# Patient Record
Sex: MALE | Race: BLACK OR AFRICAN AMERICAN | NOT HISPANIC OR LATINO | Employment: UNEMPLOYED | URBAN - METROPOLITAN AREA
[De-identification: names, ages, dates, MRNs, and addresses within clinical notes are randomized per-mention and may not be internally consistent; named-entity substitution may affect disease eponyms.]

---

## 2019-07-27 ENCOUNTER — HOSPITAL ENCOUNTER (OUTPATIENT)
Dept: TELEMEDICINE | Facility: HOSPITAL | Age: 64
Discharge: HOME | End: 2019-07-27
Payer: MEDICARE

## 2019-07-27 DIAGNOSIS — Z92.82 RECEIVED INTRAVENOUS TISSUE PLASMINOGEN ACTIVATOR (T-PA) IN EMERGENCY DEPARTMENT: ICD-10-CM

## 2019-07-27 DIAGNOSIS — I63.412 STROKE DUE TO EMBOLISM OF LEFT MIDDLE CEREBRAL ARTERY: ICD-10-CM

## 2019-07-27 PROCEDURE — G0427 PR INPT TELEHEALTH CON 70/>M: ICD-10-PCS | Mod: GT,G0,, | Performed by: PSYCHIATRY & NEUROLOGY

## 2019-07-27 PROCEDURE — G0427 INPT/ED TELECONSULT70: HCPCS | Mod: GT,G0,, | Performed by: PSYCHIATRY & NEUROLOGY

## 2019-07-28 ENCOUNTER — HOSPITAL ENCOUNTER (INPATIENT)
Facility: HOSPITAL | Age: 64
LOS: 15 days | Discharge: HOME HEALTH | DRG: 064 | End: 2019-08-12
Attending: EMERGENCY MEDICINE | Admitting: PSYCHIATRY & NEUROLOGY
Payer: MEDICARE

## 2019-07-28 DIAGNOSIS — Z92.82 RECEIVED INTRAVENOUS TISSUE PLASMINOGEN ACTIVATOR (T-PA) IN EMERGENCY DEPARTMENT: ICD-10-CM

## 2019-07-28 DIAGNOSIS — I63.9 CEREBROVASCULAR ACCIDENT (CVA), UNSPECIFIED MECHANISM: ICD-10-CM

## 2019-07-28 DIAGNOSIS — I63.412 STROKE DUE TO EMBOLISM OF LEFT MIDDLE CEREBRAL ARTERY: Primary | ICD-10-CM

## 2019-07-28 DIAGNOSIS — I63.412 EMBOLIC STROKE INVOLVING LEFT MIDDLE CEREBRAL ARTERY: ICD-10-CM

## 2019-07-28 DIAGNOSIS — R47.01 APHASIA: ICD-10-CM

## 2019-07-28 DIAGNOSIS — I63.512 ACUTE ISCHEMIC LEFT MCA STROKE: ICD-10-CM

## 2019-07-28 DIAGNOSIS — I63.9 STROKE: ICD-10-CM

## 2019-07-28 DIAGNOSIS — I10 HYPERTENSION, UNSPECIFIED TYPE: ICD-10-CM

## 2019-07-28 PROBLEM — G93.6 CYTOTOXIC CEREBRAL EDEMA: Status: ACTIVE | Noted: 2019-07-28

## 2019-07-28 PROBLEM — F10.10 ALCOHOL ABUSE: Status: ACTIVE | Noted: 2019-07-28

## 2019-07-28 LAB
ABO + RH BLD: NORMAL
ALBUMIN SERPL BCP-MCNC: 2.4 G/DL (ref 3.5–5.2)
ALP SERPL-CCNC: 68 U/L (ref 55–135)
ALT SERPL W/O P-5'-P-CCNC: 10 U/L (ref 10–44)
AMPHET+METHAMPHET UR QL: NEGATIVE
ANION GAP SERPL CALC-SCNC: 7 MMOL/L (ref 8–16)
AST SERPL-CCNC: 11 U/L (ref 10–40)
BARBITURATES UR QL SCN>200 NG/ML: NEGATIVE
BASOPHILS # BLD AUTO: 0.02 K/UL (ref 0–0.2)
BASOPHILS NFR BLD: 0.4 % (ref 0–1.9)
BENZODIAZ UR QL SCN>200 NG/ML: NORMAL
BILIRUB SERPL-MCNC: 0.4 MG/DL (ref 0.1–1)
BILIRUB UR QL STRIP: NEGATIVE
BLD GP AB SCN CELLS X3 SERPL QL: NORMAL
BUN SERPL-MCNC: 9 MG/DL (ref 8–23)
BZE UR QL SCN: NORMAL
CALCIUM SERPL-MCNC: 8.2 MG/DL (ref 8.7–10.5)
CANNABINOIDS UR QL SCN: NEGATIVE
CHLORIDE SERPL-SCNC: 110 MMOL/L (ref 95–110)
CHOLEST SERPL-MCNC: 160 MG/DL (ref 120–199)
CHOLEST/HDLC SERPL: 4.8 {RATIO} (ref 2–5)
CLARITY UR REFRACT.AUTO: CLEAR
CO2 SERPL-SCNC: 22 MMOL/L (ref 23–29)
COLOR UR AUTO: ABNORMAL
CREAT SERPL-MCNC: 0.7 MG/DL (ref 0.5–1.4)
CREAT UR-MCNC: 31 MG/DL (ref 23–375)
DIFFERENTIAL METHOD: ABNORMAL
EOSINOPHIL # BLD AUTO: 0.2 K/UL (ref 0–0.5)
EOSINOPHIL NFR BLD: 3 % (ref 0–8)
ERYTHROCYTE [DISTWIDTH] IN BLOOD BY AUTOMATED COUNT: 13.1 % (ref 11.5–14.5)
EST. GFR  (AFRICAN AMERICAN): >60 ML/MIN/1.73 M^2
EST. GFR  (NON AFRICAN AMERICAN): >60 ML/MIN/1.73 M^2
ESTIMATED AVG GLUCOSE: 91 MG/DL (ref 68–131)
ETHANOL UR-MCNC: <10 MG/DL
GLUCOSE SERPL-MCNC: 70 MG/DL (ref 70–110)
GLUCOSE UR QL STRIP: NEGATIVE
HBA1C MFR BLD HPLC: 4.8 % (ref 4–5.6)
HCT VFR BLD AUTO: 30.3 % (ref 40–54)
HDLC SERPL-MCNC: 33 MG/DL (ref 40–75)
HDLC SERPL: 20.6 % (ref 20–50)
HGB BLD-MCNC: 9.7 G/DL (ref 14–18)
HGB UR QL STRIP: ABNORMAL
IMM GRANULOCYTES # BLD AUTO: 0.04 K/UL (ref 0–0.04)
IMM GRANULOCYTES NFR BLD AUTO: 0.8 % (ref 0–0.5)
KETONES UR QL STRIP: NEGATIVE
LDLC SERPL CALC-MCNC: 110.6 MG/DL (ref 63–159)
LEUKOCYTE ESTERASE UR QL STRIP: ABNORMAL
LYMPHOCYTES # BLD AUTO: 1.3 K/UL (ref 1–4.8)
LYMPHOCYTES NFR BLD: 26.4 % (ref 18–48)
MAGNESIUM SERPL-MCNC: 1.7 MG/DL (ref 1.6–2.6)
MCH RBC QN AUTO: 28.5 PG (ref 27–31)
MCHC RBC AUTO-ENTMCNC: 32 G/DL (ref 32–36)
MCV RBC AUTO: 89 FL (ref 82–98)
METHADONE UR QL SCN>300 NG/ML: NEGATIVE
MICROSCOPIC COMMENT: ABNORMAL
MONOCYTES # BLD AUTO: 0.5 K/UL (ref 0.3–1)
MONOCYTES NFR BLD: 9 % (ref 4–15)
NEUTROPHILS # BLD AUTO: 3 K/UL (ref 1.8–7.7)
NEUTROPHILS NFR BLD: 60.4 % (ref 38–73)
NITRITE UR QL STRIP: NEGATIVE
NONHDLC SERPL-MCNC: 127 MG/DL
NRBC BLD-RTO: 0 /100 WBC
OPIATES UR QL SCN: NEGATIVE
PCP UR QL SCN>25 NG/ML: NEGATIVE
PH UR STRIP: 7 [PH] (ref 5–8)
PHOSPHATE SERPL-MCNC: 3.1 MG/DL (ref 2.7–4.5)
PLATELET # BLD AUTO: 249 K/UL (ref 150–350)
PMV BLD AUTO: 10.8 FL (ref 9.2–12.9)
POCT GLUCOSE: 73 MG/DL (ref 70–110)
POCT GLUCOSE: 96 MG/DL (ref 70–110)
POTASSIUM SERPL-SCNC: 3.3 MMOL/L (ref 3.5–5.1)
PROT SERPL-MCNC: 5.6 G/DL (ref 6–8.4)
PROT UR QL STRIP: NEGATIVE
RBC # BLD AUTO: 3.4 M/UL (ref 4.6–6.2)
RBC #/AREA URNS AUTO: 83 /HPF (ref 0–4)
SODIUM SERPL-SCNC: 139 MMOL/L (ref 136–145)
SP GR UR STRIP: 1.03 (ref 1–1.03)
SQUAMOUS #/AREA URNS AUTO: 0 /HPF
T4 FREE SERPL-MCNC: 1 NG/DL (ref 0.71–1.51)
TOXICOLOGY INFORMATION: NORMAL
TRIGL SERPL-MCNC: 82 MG/DL (ref 30–150)
TSH SERPL DL<=0.005 MIU/L-ACNC: 0.39 UIU/ML (ref 0.4–4)
URN SPEC COLLECT METH UR: ABNORMAL
WBC # BLD AUTO: 5 K/UL (ref 3.9–12.7)
WBC #/AREA URNS AUTO: 0 /HPF (ref 0–5)

## 2019-07-28 PROCEDURE — 63600175 PHARM REV CODE 636 W HCPCS: Performed by: STUDENT IN AN ORGANIZED HEALTH CARE EDUCATION/TRAINING PROGRAM

## 2019-07-28 PROCEDURE — 83735 ASSAY OF MAGNESIUM: CPT

## 2019-07-28 PROCEDURE — 93005 ELECTROCARDIOGRAM TRACING: CPT

## 2019-07-28 PROCEDURE — 99291 CRITICAL CARE FIRST HOUR: CPT

## 2019-07-28 PROCEDURE — 80307 DRUG TEST PRSMV CHEM ANLYZR: CPT

## 2019-07-28 PROCEDURE — 99223 PR INITIAL HOSPITAL CARE,LEVL III: ICD-10-PCS | Mod: ,,, | Performed by: NURSE PRACTITIONER

## 2019-07-28 PROCEDURE — 84100 ASSAY OF PHOSPHORUS: CPT

## 2019-07-28 PROCEDURE — 99291 PR CRITICAL CARE, E/M 30-74 MINUTES: ICD-10-PCS | Mod: GC,,, | Performed by: PSYCHIATRY & NEUROLOGY

## 2019-07-28 PROCEDURE — 99291 CRITICAL CARE FIRST HOUR: CPT | Mod: GC,,, | Performed by: PSYCHIATRY & NEUROLOGY

## 2019-07-28 PROCEDURE — 25500020 PHARM REV CODE 255: Performed by: EMERGENCY MEDICINE

## 2019-07-28 PROCEDURE — 84443 ASSAY THYROID STIM HORMONE: CPT

## 2019-07-28 PROCEDURE — 82962 GLUCOSE BLOOD TEST: CPT

## 2019-07-28 PROCEDURE — 84439 ASSAY OF FREE THYROXINE: CPT

## 2019-07-28 PROCEDURE — 99291 CRITICAL CARE FIRST HOUR: CPT | Mod: ,,, | Performed by: EMERGENCY MEDICINE

## 2019-07-28 PROCEDURE — 25000003 PHARM REV CODE 250: Performed by: PHYSICIAN ASSISTANT

## 2019-07-28 PROCEDURE — 80053 COMPREHEN METABOLIC PANEL: CPT

## 2019-07-28 PROCEDURE — 80061 LIPID PANEL: CPT

## 2019-07-28 PROCEDURE — 81001 URINALYSIS AUTO W/SCOPE: CPT

## 2019-07-28 PROCEDURE — 20000000 HC ICU ROOM

## 2019-07-28 PROCEDURE — 83036 HEMOGLOBIN GLYCOSYLATED A1C: CPT

## 2019-07-28 PROCEDURE — 86901 BLOOD TYPING SEROLOGIC RH(D): CPT

## 2019-07-28 PROCEDURE — 85025 COMPLETE CBC W/AUTO DIFF WBC: CPT

## 2019-07-28 PROCEDURE — 63600175 PHARM REV CODE 636 W HCPCS: Performed by: NURSE PRACTITIONER

## 2019-07-28 PROCEDURE — 93010 EKG 12-LEAD: ICD-10-PCS | Mod: 76,,, | Performed by: INTERNAL MEDICINE

## 2019-07-28 PROCEDURE — 93010 ELECTROCARDIOGRAM REPORT: CPT | Mod: 76,,, | Performed by: INTERNAL MEDICINE

## 2019-07-28 PROCEDURE — 99291 PR CRITICAL CARE, E/M 30-74 MINUTES: ICD-10-PCS | Mod: ,,, | Performed by: EMERGENCY MEDICINE

## 2019-07-28 PROCEDURE — 99223 1ST HOSP IP/OBS HIGH 75: CPT | Mod: ,,, | Performed by: NURSE PRACTITIONER

## 2019-07-28 PROCEDURE — 94761 N-INVAS EAR/PLS OXIMETRY MLT: CPT

## 2019-07-28 PROCEDURE — 93010 ELECTROCARDIOGRAM REPORT: CPT | Mod: ,,, | Performed by: INTERNAL MEDICINE

## 2019-07-28 RX ORDER — HYDRALAZINE HYDROCHLORIDE 20 MG/ML
10 INJECTION INTRAMUSCULAR; INTRAVENOUS EVERY 6 HOURS PRN
Status: DISCONTINUED | OUTPATIENT
Start: 2019-07-28 | End: 2019-08-09

## 2019-07-28 RX ORDER — ONDANSETRON 2 MG/ML
4 INJECTION INTRAMUSCULAR; INTRAVENOUS EVERY 6 HOURS PRN
Status: DISCONTINUED | OUTPATIENT
Start: 2019-07-28 | End: 2019-08-12 | Stop reason: HOSPADM

## 2019-07-28 RX ORDER — DEXTROSE MONOHYDRATE 50 MG/ML
INJECTION, SOLUTION INTRAVENOUS CONTINUOUS
Status: DISCONTINUED | OUTPATIENT
Start: 2019-07-28 | End: 2019-07-29

## 2019-07-28 RX ORDER — MIDAZOLAM HYDROCHLORIDE 1 MG/ML
1 INJECTION INTRAMUSCULAR; INTRAVENOUS ONCE
Status: COMPLETED | OUTPATIENT
Start: 2019-07-28 | End: 2019-07-28

## 2019-07-28 RX ORDER — THIAMINE HCL 100 MG
100 TABLET ORAL DAILY
Status: DISCONTINUED | OUTPATIENT
Start: 2019-07-28 | End: 2019-08-07

## 2019-07-28 RX ORDER — AMOXICILLIN 250 MG
1 CAPSULE ORAL 2 TIMES DAILY
Status: DISCONTINUED | OUTPATIENT
Start: 2019-07-28 | End: 2019-08-12 | Stop reason: HOSPADM

## 2019-07-28 RX ORDER — SODIUM CHLORIDE 0.9 % (FLUSH) 0.9 %
10 SYRINGE (ML) INJECTION
Status: DISCONTINUED | OUTPATIENT
Start: 2019-07-28 | End: 2019-08-12 | Stop reason: HOSPADM

## 2019-07-28 RX ORDER — FOLIC ACID 1 MG/1
1 TABLET ORAL DAILY
Status: DISCONTINUED | OUTPATIENT
Start: 2019-07-28 | End: 2019-08-07

## 2019-07-28 RX ORDER — ACETAMINOPHEN 325 MG/1
650 TABLET ORAL EVERY 6 HOURS PRN
Status: DISCONTINUED | OUTPATIENT
Start: 2019-07-28 | End: 2019-08-12 | Stop reason: HOSPADM

## 2019-07-28 RX ORDER — ATORVASTATIN CALCIUM 20 MG/1
40 TABLET, FILM COATED ORAL DAILY
Status: DISCONTINUED | OUTPATIENT
Start: 2019-07-28 | End: 2019-08-12 | Stop reason: HOSPADM

## 2019-07-28 RX ORDER — POTASSIUM CHLORIDE 7.45 MG/ML
10 INJECTION INTRAVENOUS
Status: COMPLETED | OUTPATIENT
Start: 2019-07-28 | End: 2019-07-28

## 2019-07-28 RX ADMIN — MIDAZOLAM HYDROCHLORIDE 1 MG: 1 INJECTION, SOLUTION INTRAMUSCULAR; INTRAVENOUS at 03:07

## 2019-07-28 RX ADMIN — POTASSIUM CHLORIDE 10 MEQ: 10 INJECTION, SOLUTION INTRAVENOUS at 03:07

## 2019-07-28 RX ADMIN — SENNOSIDES,DOCUSATE SODIUM 1 TABLET: 8.6; 5 TABLET, FILM COATED ORAL at 10:07

## 2019-07-28 RX ADMIN — IOHEXOL 75 ML: 350 INJECTION, SOLUTION INTRAVENOUS at 01:07

## 2019-07-28 RX ADMIN — DEXTROSE: 5 SOLUTION INTRAVENOUS at 04:07

## 2019-07-28 RX ADMIN — POTASSIUM CHLORIDE 10 MEQ: 10 INJECTION, SOLUTION INTRAVENOUS at 06:07

## 2019-07-28 NOTE — ASSESSMENT & PLAN NOTE
65 y/o male with L MCA syndrome received IV TPA    Antithrombotics: once out of tpa window may begin ASA 81 mg daily    Statins: Lipitor 40 mg daily    Aggressive risk factor modification: HTN     Rehab efforts: The patient has been evaluated by a stroke team provider and the therapy needs have been fully considered based off the presenting complaints and exam findings. The following therapy evaluations are needed: PT evaluate and treat, OT evaluate and treat, SLP evaluate and treat, PM&R evaluate for appropriate placement    Diagnostics ordered/pending: HgbA1C to assess blood glucose levels, Lipid Profile to assess cholesterol levels, TTE to assess cardiac function/status , TSH to assess thyroid function    VTE prophylaxis: SCD's saman begin heparin 5000 units sc Q8H on 7-29-19 at 0600    BP parameters: Infarct: Post tPA, SBP <180

## 2019-07-28 NOTE — ED TRIAGE NOTES
Patient transferred from [facility], found outside with his friend at 2200 with Right sided Weakness, Right sided Facial Droop, Slurred Speech and Left Fixed Gaze.

## 2019-07-28 NOTE — PLAN OF CARE
65 y/o Left MCA syndrome, Acute Infarct with in left post central gyrus, S/P rtPA, weakness in right arm and leg improved   - Patient started on dextrose 100ml/hour till speech eevaluate patient  - Hypokalemia, replete and replenish K+  - Consider EEG to rule out any epileptic process  - F/U with CTA neck   - F/U with TTE.  - Start on ASA and Statin post 24 hr rtPA    Néstor Anaya MD  [facility]

## 2019-07-28 NOTE — CONSULTS
[facility]  Vascular Neurology  Comprehensive Stroke Center  Tele-Consultation Note      Consults    Consulting Provider: BRITTANY DAVIS  Current Providers  No providers found    Patient Location: [facility] ED [facility] Emergency Department  Spoke hospital nurse at bedside with patient assisting consultant.     Patient information was obtained from patient.         Assessment/Plan:     STROKE DOCUMENTATION     Acute Stroke Times:   Acute Stroke Times   Last Known Normal Date: 07/27/19  Last Known Normal Time: 2200  Symptom Onset Date: 07/27/19  Symptom Onset Time: 2200  Stroke Team Called Date: 07/27/19  Stroke Team Called Time: 2247  Stroke Team Arrival Date: 07/27/19  Stroke Team Arrival Time: 2249  CT Interpretation Time: 2251  Decision to Treat Time for Alteplase: 2256    NIH Scale:  Interval: baseline  1a. Level of Consciousness: 0-->Alert, keenly responsive  1b. LOC Questions: 2-->Answers neither question correctly  1c. LOC Commands: 2-->Performs neither task correctly  2. Best Gaze: 1-->Partial gaze palsy, gaze is abnormal in one or both eyes, but forced deviation or total gaze paresis is not present  3. Visual: 0-->No visual loss  4. Facial Palsy: 2-->Partial paralysis (total or near-total paralysis of lower face)  5a. Motor Arm, Left: 0-->No drift, limb holds 90 (or 45) degrees for full 10 secs  5b. Motor Arm, Right: 3-->No effort against gravity, limb falls  6a. Motor Leg, Left: 0-->No drift, leg holds 30 degree position for full 5 secs  6b. Motor Leg, Right: 3-->No effort against gravity, leg falls to bed immediately  7. Limb Ataxia: 0-->Absent  8. Sensory: 2-->Severe to total sensory loss, patient is not aware of being touched in the face, arm, and leg  9. Best Language: 2-->Severe aphasia, all communication is through fragmentary expression, great need for inference, questioning, and guessing by the listener. Range of information that  can be exchanged is limited, listener carries burden of. . . (see row details)  10. Dysarthria: 1-->Mild-to-moderate dysarthria, patient slurs at least some words and, at worst, can be understood with some difficulty  11. Extinction and Inattention (formerly Neglect): 1-->Visual, tactile, auditory, spatial, or personal inattention or extinction to bilateral simultaneous stimulation in one of the sensory modalities  Total (NIH Stroke Scale): 19     Modified Alyssa Score: 1  Rosalba Coma Scale:    ABCD2 Score:    [id number] Score:   HAS -BLED Score:   ICH Score:   Hunt & Barksdale Classification:       Diagnoses:   Stroke due to embolism of left middle cerebral artery  Stroke due to embolism of left middle cerebral artery  Antithrombotics for secondary stroke prevention: Antiplatelets: None: Hold all Antithrombotics x 24 hours after IV t-PA administration    Statins for secondary stroke prevention and hyperlipidemia, if present:   Statins: Atorvastatin- 80 mg daily    Aggressive risk factor modification: HTN, HLD     Rehab efforts: The patient has been evaluated by a stroke team provider and the therapy needs have been fully considered based off the presenting complaints and exam findings. The following therapy evaluations are needed: PT evaluate and treat, OT evaluate and treat, SLP evaluate and treat    Diagnostics ordered/pending: CTA Head to assess vasculature , CTA Neck/Arch to assess vasculature, MRI head without contrast to assess brain parenchyma, TTE to assess cardiac function/status , TSH to assess thyroid function    VTE prophylaxis: None: Reason for No Pharmacological VTE Prophylaxis: Mechanical prophylaxis: Place SCDs    BP parameters: Infarct: Post tPA, SBP <180            There were no vitals taken for this visit.  Alteplase Eligible?: Yes  Alteplase Recommendation:   Alteplase Total Dose:   Total dose: Alteplase 0.9mg/kg (max dose:90mg)                      ** based on acquired weight from facility    Bolus Dose:   10% of total Alteplase dose given intravenously over 1 minute   Continuous Infusion Dose:   Remaining 90% of total Alteplase dose infused intravenously over 60 minutes    **infusion must start at the same time as the bolus dose     Additional Recommendations:   1. Neurological assessment and vital signs (except temperature) every 15 minutes during Altaplase infusion.  2. Frequency of BP assessments may need to be increased if systolic BP stays >= 180 mm Hg or diastolic BP stays >= 105 mm Hg. Administer antihypertensive meds as ordered  3. Continue to monitor and control blood pressure and monitor for neurological deterioration every 15 minutes for the first hour after the infusion is stopped. Then every 30 minutes for the next 6 hours. Perform hourly monitoring from the 8th post-infusion hour until 24 hours post-infusion.  4. Temperature every 4 hours or as required.  5. Follow hospital protocol for further orders re: post tPA infusion patient management.  6. No antithrombotics or anticoagulants (including but not limited to: heparin, warfarin, aspirin, clopidigrel, or dipyridamole) for 24 hours, then start antithrombotics as ordered by treating physician    Adapted from the American Heart Association/American Stroke Association (AHA/ASA) and American Association of Neuroscience Nurses (AANN) Guidelines.   Possible Interventional Revascularization Candidate? Yes    Disposition Recommendation: transfer to [facility] by  air  stat    Subjective:     History of Present Illness:  63 y/o male LKN 2200 today when he had sudden onset right hemiplegia, aphasia, right dipika-neglect, left gaze preference and right hemisensory loss.      Woke up with symptoms?: no    Recent bleeding noted: no  Does the patient take any Blood Thinners? no  Medications: Antiplatelets:  aspirin      Past Medical History: hypertension, hyperlipidemia and stroke    Past Surgical History: none and no major surgeries within  the last 2 weeks    Family History: no relevant history    Social History: no smoking, no drinking, no drugs    Allergies: Allergies have not been reviewed No known drug allergies    Review of Systems   Neurological: Positive for facial asymmetry, speech difficulty, weakness and numbness.   All other systems reviewed and are negative.    Objective:   Vitals: There were no vitals taken for this visit. BP: 192/101 and Heart Rate: 72    CT READ: Yes  No hemmorhage. No mass effect. No early infarct signs.     Physical Exam   Constitutional: He is oriented to person, place, and time. He appears well-developed and well-nourished.   HENT:   Head: Normocephalic and atraumatic.   Eyes: Pupils are equal, round, and reactive to light. EOM are normal.   Cardiovascular: Normal rate and regular rhythm.   Pulmonary/Chest: Effort normal.   Neurological: He is alert and oriented to person, place, and time. A cranial nerve deficit and sensory deficit is present.   Vitals reviewed.            Recommended the emergency room physician to have a brief discussion with the patient and/or family if available regarding the risks and benefits of treatment, and to briefly document the occurrence of that discussion in his clinical encounter note.     The attending portion of this evaluation, treatment, and documentation was performed per Henna Parra MD via audiovisual.    Billing code:  (moderate to severe stroke, large areas of edema, some mimics)    · This patient has a critical neurological condition/illness, with high morbidity and mortality.  · There is a high probability for acute neurological change leading to clinical and possibly life-threatening deterioration requiring highest level of physician preparedness for urgent intervention.  · Care was coordinated with other physicians involved in the patient's care.  · Radiologic studies and laboratory data were reviewed and interpreted, and plan of care was re-assessed based on  the results.  · Diagnosis, treatment options and prognosis may have been discussed with the patient and/or family members or caregiver.  · Further advanced medical management and further evaluation is warranted for his care.      In your opinion, this was a: Tier 1 Van Negative    Consult End Time: 11:12 PM     Henna Parra MD  [facility]  Vascular Neurology   [facility]

## 2019-07-28 NOTE — ED PROVIDER NOTES
"Encounter Date: 7/27/2019       History     Chief Complaint   Patient presents with    TPA Transfer     [facility], TPA     HPI  Review of patient's allergies indicates:  No Known Allergies  No past medical history on file.  No past surgical history on file.  No family history on file.  Social History     Tobacco Use    Smoking status: Not on file   Substance Use Topics    Alcohol use: Not on file    Drug use: Not on file     Review of Systems    Physical Exam     Initial Vitals [07/28/19 0144]   BP Pulse Resp Temp SpO2   (!) 179/80 80 18 98.4 °F (36.9 °C) 99 %      MAP       --         Physical Exam    ED Course   Procedures  Labs Reviewed - No data to display       Imaging Results          X-Ray Chest AP Portable (Final result)  Result time 07/28/19 03:14:38    Final result by Prem Wagner MD (07/28/19 03:14:38)                 Impression:      No acute cardiopulmonary finding identified on this single view.      Electronically signed by: Prem Wagner MD  Date:    07/28/2019  Time:    03:14             Narrative:    EXAMINATION:  XR CHEST AP PORTABLE    CLINICAL HISTORY:  Provided history is "cough;  ".    TECHNIQUE:  One view of the chest.    COMPARISON:  None.    FINDINGS:  Cardiac wires overlie the chest.  Cardiac silhouette is not enlarged.  Atherosclerotic calcifications overlie the aortic arch.  No focal consolidation.  No sizable pleural effusion.  No pneumothorax.                               X-Ray Abdomen AP 1 View (Final result)  Result time 07/28/19 03:15:21    Final result by Prem Wagner MD (07/28/19 03:15:21)                 Impression:      Nonobstructive bowel gas pattern.      Electronically signed by: Prem Wagner MD  Date:    07/28/2019  Time:    03:15             Narrative:    EXAMINATION:  XR ABDOMEN AP 1 VIEW    CLINICAL HISTORY:  stroke;    TECHNIQUE:  Single AP View of the abdomen was performed.    COMPARISON:  None.    FINDINGS:  Multiple cardiac " wires overlie the chest and abdomen.  Bowel gas pattern is unremarkable.  Contrast is present in the bladder from recent CTA.  No large volume fecal burden.  Bones demonstrate degenerative changes but no aggressive finding.  No unexpected radiopaque foreign body identified.                               CTA STROKE MULTI-PHASE (In process)  Result time 07/28/19 03:22:57                 Medical Decision Making:   ED Management:                  Attending Attestation:     Physician Attestation Statement for NP/PA:   I reviewed the chart but I did not personally examine the patient. The face to face encounter was performed by the NP/PA.    Other NP/PA Attestation Additions:      Medical Decision Making: Evaluation of CC: Emergent evaluation of acute neurological deficit concerning for stroke. Evaluation by Telestroke and sent for further evaluation to determine if the patient is an IR candidate. TPA given prior to arrival. Fast healer neurological evaluation for patient prior to arrival. CT imagining inconclusive so patient sent to MRI.  Emergent evaluation of acute neurological deficit concerning for stroke. Evaluation by Telestroke and sent for further evaluation to determine if the patient is an IR candidate. TPA given prior to arrival. [facility] healer neurological evaluation for patient prior to arrival. CT imagining inconclusive so patient sent to MRI.                    Clinical Impression:       ICD-10-CM ICD-9-CM   1. Cerebrovascular accident (CVA), unspecified mechanism I63.9 434.91

## 2019-07-28 NOTE — SUBJECTIVE & OBJECTIVE
Woke up with symptoms?: no    Recent bleeding noted: no  Does the patient take any Blood Thinners? no  Medications: Antiplatelets:  aspirin      Past Medical History: hypertension, hyperlipidemia and stroke    Past Surgical History: none and no major surgeries within the last 2 weeks    Family History: no relevant history    Social History: no smoking, no drinking, no drugs    Allergies: Allergies have not been reviewed No known drug allergies    Review of Systems   Neurological: Positive for facial asymmetry, speech difficulty, weakness and numbness.   All other systems reviewed and are negative.    Objective:   Vitals: There were no vitals taken for this visit. BP: 192/101 and Heart Rate: 72    CT READ: Yes  No hemmorhage. No mass effect. No early infarct signs.     Physical Exam   Constitutional: He is oriented to person, place, and time. He appears well-developed and well-nourished.   HENT:   Head: Normocephalic and atraumatic.   Eyes: Pupils are equal, round, and reactive to light. EOM are normal.   Cardiovascular: Normal rate and regular rhythm.   Pulmonary/Chest: Effort normal.   Neurological: He is alert and oriented to person, place, and time. A cranial nerve deficit and sensory deficit is present.   Vitals reviewed.

## 2019-07-28 NOTE — ED NOTES
The patient is asleep and responds to voice, pt is calm with a flat affect, patient is aware of environment. Airway is open and patent, respirations are spontaneous, normal respiratory effort and rate noted, skin warm and dry, appearance: no apparent distress noted.

## 2019-07-28 NOTE — ASSESSMENT & PLAN NOTE
Stroke due to embolism of left middle cerebral artery  Antithrombotics for secondary stroke prevention: Antiplatelets: None: Hold all Antithrombotics x 24 hours after IV t-PA administration    Statins for secondary stroke prevention and hyperlipidemia, if present:   Statins: Atorvastatin- 80 mg daily    Aggressive risk factor modification: HTN, HLD     Rehab efforts: The patient has been evaluated by a stroke team provider and the therapy needs have been fully considered based off the presenting complaints and exam findings. The following therapy evaluations are needed: PT evaluate and treat, OT evaluate and treat, SLP evaluate and treat    Diagnostics ordered/pending: CTA Head to assess vasculature , CTA Neck/Arch to assess vasculature, MRI head without contrast to assess brain parenchyma, TTE to assess cardiac function/status , TSH to assess thyroid function    VTE prophylaxis: None: Reason for No Pharmacological VTE Prophylaxis: Mechanical prophylaxis: Place SCDs    BP parameters: Infarct: Post tPA, SBP <180

## 2019-07-28 NOTE — ED NOTES
Pt's first and last name and birthday confirmed.   LOC: The patient is asleep and responds to voice; flat affect, the patient is aphasic.  APPEARANCE: Patient resting comfortably and in no acute distress, patient is clean and well groomed.  SKIN: The skin is warm and dry, patient has normal skin turgor and moist mucus membranes, skin intact, no breakdown or brusing noted.  MUSKULOSKELETAL:  no obvious swelling or deformities noted.  RESPIRATORY: Airway is open and patent, respirations are spontaneous, patient has a normal effort and rate. Breath sounds are clear and equal bilaterally.  CARDIAC: Normal heart sounds. No peripheral edema.  ABDOMEN: Soft and non tender to palpation, no distention noted. Bowel sounds present.   NEURO: (See flow sheet.)

## 2019-07-28 NOTE — PROGRESS NOTES
Patient arrived to [facility] from [facility]    Type of stroke/diagnosis: Ischemic Left MCA    TPA start and end time: start (2320 7/27) end (0021 7/28)    Thrombectomy start and end time N/A    Current symptoms: Right sided weakness    Skin assessment done: Yes  Wounds noted: None    NCC notified: MARIUSZ Ocampo

## 2019-07-28 NOTE — CONSULTS
Inpatient consult to Physical Medicine Rehab  Consult performed by: MARION Maldonado  Consult ordered by: Yessy Kathleen PA-C  Reason for consult: assess rehab needs      Consult received.  Reviewed patient history and current admission.  Rehab team following.  Full consult to follow.    MERT Farris, TRELLP-C  Physical Medicine & Rehabilitation   07/28/2019  Spectralink: [id number]

## 2019-07-28 NOTE — PROGRESS NOTES
Patient transferred to the MRI bed,, tele, O2 sensor and O2 at 2L ,, BP cuff applied,, placed in MRI,, tolerating well,, all vitals WNL,, WCTM

## 2019-07-28 NOTE — ED PROVIDER NOTES
Encounter Date: 7/27/2019    SCRIBE #1 NOTE: I, Kimberly Otero, am scribing for, and in the presence of,  Dr. Garcia. I have scribed the following portions of the note - the Resident attestation.       History     Chief Complaint   Patient presents with    TPA Transfer     [facility] Transfer, TPA     Mr. Kincaid is a 64M presenting from [facility] after suspected CVA s/p TPA.  Per EMS, was walking with friend around 930pm this even, became aphasic and acute onset right sided upper and lower extremity weakness. Patient is aphasic and unable to provide any additional history.  Had CT head at OSH before transfer.         Review of patient's allergies indicates:  No Known Allergies  Past Medical History:   Diagnosis Date    HTN (hypertension)     Stroke     muliple as seen on CT scan     History reviewed. No pertinent surgical history.  History reviewed. No pertinent family history.  Social History     Tobacco Use    Smoking status: Not on file   Substance Use Topics    Alcohol use: Not on file    Drug use: Not on file     Review of Systems   Unable to perform ROS: Patient nonverbal       Physical Exam     Initial Vitals [07/28/19 0144]   BP Pulse Resp Temp SpO2   (!) 179/80 80 18 98.4 °F (36.9 °C) 99 %      MAP       --         Physical Exam    Nursing note and vitals reviewed.  Constitutional:   Garbled aphasic speech. RUE held into body.    HENT:   Head: Normocephalic and atraumatic.   Mouth/Throat: Oropharynx is clear and moist.   Eyes: EOM are normal. Pupils are equal, round, and reactive to light.   Neck: Normal range of motion. Neck supple.   Cardiovascular: Normal rate and regular rhythm.   Pulmonary/Chest: Effort normal and breath sounds normal.   Abdominal: Soft. Bowel sounds are normal.   Musculoskeletal: Normal range of motion.        Right lower leg: Normal.        Left lower leg: Normal.   Neurological: He is alert and oriented to person, place, and time. A cranial nerve deficit is  present.   Unable to assess strength and sensation as patient not following commands.   Skin: Skin is warm and dry. Capillary refill takes less than 2 seconds.   Psychiatric: He has a normal mood and affect. His behavior is normal.         ED Course   Procedures  Labs Reviewed   URINALYSIS, REFLEX TO URINE CULTURE - Abnormal; Notable for the following components:       Result Value    Occult Blood UA 2+ (*)     Leukocytes, UA Trace (*)     All other components within normal limits    Narrative:     Preferred Collection Type->Urine, Clean Catch   URINALYSIS MICROSCOPIC - Abnormal; Notable for the following components:    RBC, UA 83 (*)     All other components within normal limits    Narrative:     Preferred Collection Type->Urine, Clean Catch   TOXICOLOGY SCREEN, URINE, RANDOM (COMPLIANCE)   LIPID PANEL   HEMOGLOBIN A1C   TSH   TYPE & SCREEN   POCT GLUCOSE   POCT GLUCOSE MONITORING CONTINUOUS     EKG Readings: (Independently Interpreted)   Initial Reading: No STEMI. Rhythm: Normal Sinus Rhythm. Heart Rate: 69. Ectopy: PACs. ST Segments: Non-Specific ST Segment Depression.       Imaging Results           MRI Brain Ischemic Inter Pro Incl MRA W/O Con (Final result)  Result time 07/28/19 04:52:52    Final result by Prem Wagner MD (07/28/19 04:52:52)                 Impression:      Acute infarct within the left postcentral gyrus of the parietal lobe.  No proximal arterial occlusion demonstrated on MRA.    Remote infarcts within the bilateral precentral gyri and left middle frontal gyrus, consistent with remote bilateral MCA and left TJ infarcts.    Generalized cerebral volume loss and chronic microvascular ischemic disease.    This report was flagged in Epic as abnormal.    Electronically signed by resident: Charlotte Roldan  Date:    07/28/2019  Time:    04:04    Electronically signed by: Prem Wagner MD  Date:    07/28/2019  Time:    04:52             Narrative:    EXAMINATION:  MRI BRAIN ISCHEMIC  INTERVENTIONAL PROTOCOL INCL MRA W/O CONTRAST    CLINICAL HISTORY:  Stroke;    TECHNIQUE:  Multiplanar multisequence MR imaging of the brain was performed without contrast.  MRA time of flight sequences through the intracranial arteries were obtained with 2D and 3D reformatting.    COMPARISON:  CTA head 07/28/2019.    FINDINGS:  Intracranial compartment:    Generalized cerebral volume loss with compensatory enlargement of ventricles and sulci.    There is a focal area of diffusion restriction, which demonstrates accompanying decreased signal on ADC within the left postcentral gyrus, consistent with acute infarct.    There are scattered areas of T2 FLAIR hyperintensity within the bilateral precentral gyri and left medial frontal gyrus without evidence of diffusion restriction, most compatible with prior infarcts.  FLAIR imaging also demonstrates punctate and confluent periventricular and subcortical white matter signal hyperintensity most consistent with chronic microvascular ischemic disease.    No mass effect or midline shift.    No evidence of acute hemorrhage.    No extra-axial blood or fluid collections    MRA images are moderately limited by motion degradation.  No proximal arterial occlusion is demonstrated.  There is irregularity and narrowing involving the middle cerebral arteries bilaterally.    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    There is small caliber of the left anterior cerebral artery and right A1 segment hypoplasia.    Skull/extracranial contents (limited evaluation): Bone marrow signal intensity is normal.  There is left maxillary sinus and ethmoid air cell mucosal thickening.                               X-Ray Chest AP Portable (Final result)  Result time 07/28/19 03:14:38    Final result by Prem Wagner MD (07/28/19 03:14:38)                 Impression:      No acute cardiopulmonary finding identified on this  "single view.      Electronically signed by: Prem Wagner MD  Date:    07/28/2019  Time:    03:14             Narrative:    EXAMINATION:  XR CHEST AP PORTABLE    CLINICAL HISTORY:  Provided history is "cough;  ".    TECHNIQUE:  One view of the chest.    COMPARISON:  None.    FINDINGS:  Cardiac wires overlie the chest.  Cardiac silhouette is not enlarged.  Atherosclerotic calcifications overlie the aortic arch.  No focal consolidation.  No sizable pleural effusion.  No pneumothorax.                               X-Ray Abdomen AP 1 View (Final result)  Result time 07/28/19 03:15:21    Final result by Prem Wagner MD (07/28/19 03:15:21)                 Impression:      Nonobstructive bowel gas pattern.      Electronically signed by: Perm Wagner MD  Date:    07/28/2019  Time:    03:15             Narrative:    EXAMINATION:  XR ABDOMEN AP 1 VIEW    CLINICAL HISTORY:  stroke;    TECHNIQUE:  Single AP View of the abdomen was performed.    COMPARISON:  None.    FINDINGS:  Multiple cardiac wires overlie the chest and abdomen.  Bowel gas pattern is unremarkable.  Contrast is present in the bladder from recent CTA.  No large volume fecal burden.  Bones demonstrate degenerative changes but no aggressive finding.  No unexpected radiopaque foreign body identified.                               CTA STROKE MULTI-PHASE (Final result)  Result time 07/28/19 03:43:11    Final result by Prem Wagner MD (07/28/19 03:43:11)                 Impression:      Bilateral frontal lobe encephalomalacia, likely secondary to remote left TJ and bilateral anterior MCA distribution infarcts.    Multifocal areas of irregularity and narrowing involving the intracranial arteries, specifically the left TJ and bilateral M2 branches which may be related to remote insults/atherosclerosis.  No evidence of acute proximal/large vessel occlusion.  However, further evaluation with MRI is suggested for correlation and to exclude acute " infarct.    Generalized cerebral volume loss and chronic microvascular ischemic changes.    Electronically signed by resident: Charlotte Roldan  Date:    07/28/2019  Time:    02:44    Electronically signed by: Prem Wagner MD  Date:    07/28/2019  Time:    03:43             Narrative:    EXAMINATION:  CTA STROKE MULTI-PHASE    CLINICAL HISTORY:  Stroke;    TECHNIQUE:  Low-dose axial CT were obtained throughout the region of the head without the use of intravenous contrast.  CT angiogram was performed from through the cervical and intracranial vasculature during the IV bolus administration of 75mL of Omnipaque 350.  Multiplanar MPR and MIP reformats were performed.    COMPARISON:  None    FINDINGS:  The ventricles are normal in size without evidence of hydrocephalus.    There are areas of encephalomalacia within the bilateral frontal lobes, within the distribution of the left TJ and the bilateral middle cerebral arteries, likely from remote infarcts.  There is no parenchymal mass, hemorrhage, edema or evidence of acute infarct.  Moderate generalized cerebral volume loss with ex vacuo dilation of the ventricles and sulci.  Moderate periventricular and subcortical white matter hypoattenuation, most likely secondary to chronic microvascular ischemic disease.    No extra-axial blood or fluid collections.    The cranium appears intact. Mastoid air cells are essentially clear.  There is left maxillary sinus and ethmoid air cell mucosal thickening.      CTA:    Evaluation of the intracranial arteries is limited by suboptimal bolus timing.    The aortic arch maintains a normal branching pattern.    The common and internal carotid arteries are normal in course and caliber.  There is scattered calcific atherosclerosis without focal stenosis in either carotid bifurcation.    The vertebral origins are patent.  There is posterior inferior cerebellar artery termination of a hypoplastic left vertebral artery, a normal anatomic  variant.  The right vertebral artery is dominant and continues as the basilar artery.  Left vertebral artery is diminutive but patent throughout its course.  Right vertebral artery is dominant and patent throughout its cervical course.  Vertebrobasilar system is within normal limits without focal abnormality.    There are multifocal areas of narrowing/stenosis of the intracranial arteries likely related to atherosclerotic disease.  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  There appears to be occlusion of a branch of the left M2 which terminates in a region of encephalomalacia, likely representing chronic occlusion.  Similar irregularity of a branch of the right M2 artery may reflect a chronic occlusion.    The posterior cerebral arteries are grossly patent.    There is advanced dental disease with several missing teeth and periodontal disease.  Degenerative changes are noted in the cervical spine.  Neck soft tissues are negative for acute finding.  Lung apices are unremarkable.                                 Medical Decision Making:   History:   I obtained history from: another health care provider, someone other than patient and EMS provider.  Old Medical Records: I decided to obtain old medical records.  Old Records Summarized: records from clinic visits, records from previous admission(s) and records from another hospital.  Initial Assessment:   PGY-3 MDM    Assessment:  64M acutely aphasic with right sided weakness, suspected CVA s/p TPA at OSH.  Still aphasic on arrival.   Ddx: CVA, intercranial process  Workup: stroke workup, CTA and MRI brain ordered, pending.  Vascular neurology and neuro ICU consulted.  Permissive HTN, <180 SBP.   Dispo:   Admission to Neuro ICU for q1h neuro  checks    Case discussed with Dr. Jose Colin  Internal Medicine/Emergency Medicine, PGY-3  1:45 AM      Clinical Tests:   Lab Tests: Reviewed  Radiological Study: Reviewed  Medical Tests: Reviewed  Other:   I have discussed this case with another health care provider.    Additional MDM:     NIH Stroke Scale:   Interval = 1 hour post tPA or endovascular procedure completion  Level of consciousness = 0 - alert  LOC questions = 2 - answers none correctly  Best gaze = 0 - normal  Visual = 0 - no visual loss  Facial palsy = 2 - partial  Motor left arm =  0 - no drift  Motor right arm =  1 - drift  Best language = 2 - severe aphasia  Dysarthria = 1 - mild to moderate dysarthria  Extinction and inattention = 1 - partial neglect  NIH Stroke Scale Total = 9         Scribe Attestation:   Scribe #1: I performed the above scribed service and the documentation accurately describes the services I performed. I attest to the accuracy of the note.    Attending Attestation:   Physician Attestation Statement for Resident:  As the supervising MD   Physician Attestation Statement: I have personally seen and examined this patient.   I agree with the above history. -:   As the supervising MD I agree with the above PE.    As the supervising MD I agree with the above treatment, course, plan, and disposition.        Attending Critical Care:   Critical Care Times:   Direct Patient Care (initial evaluation, reassessments, and time considering the case)................................................................20 minutes.   Additional History from reviewing old medical records or taking additional history from the family, EMS, PCP, etc.......................5 minutes.   Ordering, Reviewing, and Interpreting Diagnostic Studies...............................................................................................................5 minutes.    Documentation..................................................................................................................................................................................5 minutes.   Consultation with other Physicians. .................................................................................................................................................5 minutes.   Consultation with the patient's family directly relating to the patient's condition, care, and DNR status (when patient unable)......10 minutes.   ==============================================================  · Total Critical Care Time - exclusive of procedural time: 50 minutes.  ==============================================================  Critical care was necessary to treat or prevent imminent or life-threatening deterioration of the following conditions: stroke.   The following critical care procedures were done by me (see procedure notes): pulse oximetry.   Critical care was time spent personally by me on the following activities: obtaining history from patient or relative, examination of patient, review of x-rays / CT sent with the patient, review of old charts, ordering lab, x-rays, and/or EKG, development of treatment plan with patient or relative, ordering and performing treatments and interventions, discussion with consultants, evaluation of patient's response to treatment, re-evaluation of patient's conition and interpretation of cardiac measurements.   Critical Care Condition: potentially life-threatening       Attending ED Notes:     Emergent evaluation of acute neurological deficit concerning for stroke. Evaluation by Telestroke performed at outside facility and sent here for emergent evaluation by vascular Neurology and to determine if the patient is an IR candidate. TPA given prior to arrival.  CT imagining inconclusive so patient sent to MRI.  Because family was not available, x-ray imaging was obtained  for medical clearance prior to MRI.  After reviewing MRI, vascular Neurology deemed of the patient was not a candidate for intervention.  He will be admitted to the neuro ICU for further tPA monitoring and management.             Clinical Impression:       ICD-10-CM ICD-9-CM   1. Cerebrovascular accident (CVA), unspecified mechanism I63.9 434.91   2. Stroke I63.9 434.91   3. Stroke due to embolism of left middle cerebral artery I63.412 434.11   4. Aphasia R47.01 784.3   5. Hypertension, unspecified type I10 401.9   6. Received intravenous tissue plasminogen activator (t-PA) in emergency department Z92.82 V45.88         Disposition:   Disposition: Admitted  Condition: Serious                        Caroline Magdalena Colin MD  Resident  07/28/19 0321       Contreras Garcia MD  07/28/19 0534       Contreras Garcia MD  07/28/19 0594

## 2019-07-28 NOTE — ED NOTES
The patient is asleep and responds to voice, patient is aware of environment. Airway is open and patent, respirations are spontaneous, normal respiratory effort and rate noted, skin warm and dry, appearance: no apparent distress noted.

## 2019-07-28 NOTE — ED NOTES
Respirations are spontaneous with normal rate and effort. Pt shows no signs of distress. Maradiaga draining clear, yellow urine.

## 2019-07-28 NOTE — SUBJECTIVE & OBJECTIVE
No past medical history on file.  No past surgical history on file.  No family history on file.  Social History     Tobacco Use    Smoking status: Not on file   Substance Use Topics    Alcohol use: Not on file    Drug use: Not on file     Review of patient's allergies indicates:  No Known Allergies    Medications: I have reviewed the current medication administration record.      (Not in a hospital admission)    Review of Systems   Unable to perform ROS: Patient nonverbal     Objective:     Vital Signs (Most Recent):  Temp: 98.4 °F (36.9 °C) (07/28/19 0144)  Pulse: 64 (07/28/19 0345)  Resp: 19 (07/28/19 0320)  BP: 137/80 (07/28/19 0345)  SpO2: 100 % (07/28/19 0345)    Vital Signs Range (Last 24H):  Temp:  [98.4 °F (36.9 °C)]   Pulse:  [64-94]   Resp:  [18-24]   BP: (137-184)/()   SpO2:  [95 %-100 %]     Physical Exam   Constitutional:   Thin elderly looking male   HENT:   Head: Normocephalic and atraumatic.   Eyes: Pupils are equal, round, and reactive to light.   Neck: Normal range of motion.   Cardiovascular: Normal rate.   Pulmonary/Chest: Effort normal.   Abdominal: Soft.   Neurological:   Drowsy, right sided weakness, aphasia   Skin: Skin is warm and dry.   Nursing note and vitals reviewed.      Neurological Exam:   LOC: drowsy  Attention Span: poor  Language: Global aphasia  Articulation: Dysarthria  Orientation: Not oriented to person, place, and time  Visual Fields: Hemianopsia right  EOM (CN III, IV, VI): Full/intact  Pupils (CN II, III): PERRL  Facial Sensation (CN V): Normal  Facial Movement (CN VII): Lower facial weakness on the Right  Gag Reflex: present  Reflexes: flexor plantar responses bilaterally  Motor: Arm left  Normal 5/5  Leg left  Normal 5/5  Arm right  Paresis: 3/5  Leg right Paresis: 3/5  Cebellar: No evidence of appendicular or axial ataxia  Sensation: Pee-anesthesia right  Tone: Normal tone throughout      Laboratory:  CMP: No results for input(s): GLUCOSE, CALCIUM, ALBUMIN,  PROT, NA, K, CO2, CL, BUN, CREATININE, ALKPHOS, ALT, AST, BILITOT in the last 24 hours.  CBC: No results for input(s): WBC, RBC, HGB, HCT, PLT, MCV, MCH, MCHC in the last 168 hours.  Lipid Panel: No results for input(s): CHOL, LDLCALC, HDL, TRIG in the last 168 hours.  Coagulation: No results for input(s): PT, INR, APTT in the last 168 hours.  Hgb A1C: No results for input(s): HGBA1C in the last 168 hours.  TSH: No results for input(s): TSH in the last 168 hours.    Diagnostic Results:      Brain imaging:      Vessel Imaging:  CTA head and neck multiphase 7-28-19 results:  Bilateral frontal lobe encephalomalacia, likely secondary to remote left TJ and bilateral anterior MCA distribution infarcts.    Multifocal areas of irregularity and narrowing involving the intracranial arteries, specifically the left TJ and bilateral M2 branches which may be related to remote insults/atherosclerosis.  No evidence of acute proximal/large vessel occlusion.  However, further evaluation with MRI is suggested for correlation and to exclude acute infarct.    Generalized cerebral volume loss and chronic microvascular ischemic changes.    MRI Brain acute interventional protocol 7-28-19 results:      Cardiac Evaluation:   EKG 7-28-19 pending

## 2019-07-28 NOTE — HPI
65 y/o male LKN 2200 today when he had sudden onset right hemiplegia, aphasia, right dipika-neglect, left gaze preference and right hemisensory loss.

## 2019-07-28 NOTE — H&P
[facility]  Neurocritical Care  History & Physical    Admit Date: 7/28/2019  Service Date: 07/28/2019  Length of Stay: 0    Subjective:     Chief Complaint: Stroke due to embolism of left middle cerebral artery    History of Present Illness: Mr. Kincaid is a 65 y/o male with PMH of multiple previous CVAs, COPD and HTN who presents to [facility] [place] a transfer s/p TPA administration for L MCA syndrome. He was found by neighbors at approximately 2230 last night (7/27) to have acute onset aphasia and RSW. He was taken to [facility]. He was seen by telestroke and TPA was given. He was then transferred to [facility] for possible thrombectomy. CTA on arrival showed possible LM2 occlusion of questionable chronicity as well as multiple strokes. MRI stroke protocol was therefore obtained, but no proximal occlusion seen for intervention. Patient will be admitted to [facility] for post-TPA care and stroke work-up/managament.     Per daughters at bedside, patient has history of smoking, alcohol abuse, and crack cocaine use. He has been intermittently homeless over the last several years. He has baseline dysarthria from previous strokes and lower extremity weakness/pain and intermittently uses a walker/cane to ambulate.     Past Medical History:   Diagnosis Date    HTN (hypertension)     Stroke     muliple as seen on CT scan     History reviewed. No pertinent surgical history.    No current facility-administered medications on file prior to encounter.      No current outpatient medications on file prior to encounter.     Allergies: Patient has no known allergies.    History reviewed. No pertinent family history.    Social History     Tobacco Use    Smoking status: Not on file   Substance Use Topics    Alcohol use: Not on file    Drug use: Not on file      Review of Systems: Unable to obtain a complete ROS due to aphasia.     Vitals:   Temp: 98.2 °F (36.8 °C)  Pulse: 69  BP: 139/61  MAP (mmHg): 117  Resp:  12  SpO2: 100 %  Oxygen Concentration (%): 2  O2 Device (Oxygen Therapy): room air    Temp  Min: 98.2 °F (36.8 °C)  Max: 98.4 °F (36.9 °C)  Pulse  Min: 57  Max: 94  BP  Min: 114/64  Max: 184/83  MAP (mmHg)  Min: 117  Max: 117  Resp  Min: 12  Max: 24  SpO2  Min: 95 %  Max: 100 %  Oxygen Concentration (%)  Min: 2  Max: 2    No intake/output data recorded.         Examination:   Constitutional: Thin. No apparent distress.   Eyes: Conjunctiva clear, anicteric. Lids no lesions.  Head/Ears/Nose/Mouth/Throat/Neck: Poor dentition. Moist mucous membranes. External ears, nose atraumatic.   Cardiovascular: Regular rhythm. No murmurs. No leg edema.  Respiratory: Comfortable respirations. Clear to auscultation.  Gastrointestinal: No hernia. Soft, nondistended, nontender. + bowel sounds.    Neurologic:   -[id number]  -Alert. Aphasic. Responds to questions with unintelligible sounds. Does not follow commands.   -Cranial nerves not intact, particularly R facial droop. L gaze preference.   -Strength: full strength on R. Moves L side spontaneously and purposefully. Upper does not oppose gravity, lower opposes gravity but not resistance.   -Sensation: withdraws from pain in all extremities   Unable to test orientation, language, coordination, gait due to level of consciousness.    Today I independently reviewed pertinent medications, lines/drains/airways, imaging, laboratory results, notably:     Assessment/Plan:     Neuro  * Stroke due to embolism of left middle cerebral artery  65 y/o male with history of previous CVAs with residual dysarthria and impaired lower extremity function, HTN, alcohol and drug abuse presents to [facility] with LMCA syndrome, TPA administered. Not a thrombectomy candidate.   - Admit to NCC  - Vascular Neurology consult  - SBP goal 100-180  - Atorvastatin 40 mg daily  - MRI obtained to determine whether thrombectomy candidate.   - CTH this evening for post-TPA imaging   - Holding ASA and SQH during 24-hour TPA  administration window, plan to begin tonight for secondary stroke prevention  - Stroke work-up, including TSH, lipid panel, hemoglobin A1c, 2D echo   - PT/OT/SLP    Cytotoxic cerebral edema  From ischemic stroke  - Monitor with brain imaging     Aphasia  From LMCA stroke     Psychiatric  Alcohol abuse  History of, per daughters  - Thiamine and folic acid daily   - Utox pending     Cardiac/Vascular  Essential hypertension  SBP goal 100-180  - 2D echo pending        The patient is being Prophylaxed for:  Venous Thromboembolism with: Mechanical  Stress Ulcer with: None  Ventilator Pneumonia with: none    Activity Orders          Diet NPO: NPO starting at 07/28 0426        Full Code    Yessy Kathleen PA-C  Neurocritical Care  [facility]

## 2019-07-28 NOTE — ED NOTES
"Daughter, Keily Kincaid [phone]  Daughter, Amarilis Silverman [phone]    Both reiterated that their father should be a full code and wish to be contacted with any changes in status or bed assignment.    They also stated that the patient's sister, Delaney Kincaid was saying she was going to come and take him out of the hospital and take him home.  Daughters are both adamant about their father not leaving the hospital.  Reassured patient's daughters that he would not be able to be "checked out" of the hospital by his sister, as he has two living children.    "

## 2019-07-28 NOTE — ASSESSMENT & PLAN NOTE
63 y/o male with history of previous CVAs with residual dysarthria and impaired lower extremity function, HTN, alcohol and drug abuse presents to [facility] with LMCA syndrome, TPA administered. Not a thrombectomy candidate.   - Admit to NCC  - Vascular Neurology consult  - SBP goal 100-180  - Atorvastatin 40 mg daily  - MRI obtained to determine whether thrombectomy candidate.   - CTH this evening for post-TPA imaging   - Holding ASA and SQH during 24-hour TPA administration window, plan to begin tonight for secondary stroke prevention  - Stroke work-up, including TSH, lipid panel, hemoglobin A1c, 2D echo   - PT/OT/SLP

## 2019-07-28 NOTE — ED NOTES
Patient identifiers have been checked and are correct.    LOC: The patient is awake and alert. See Neuro Assessment.  APPEARANCE: No acute distress noted.   SKIN: The skin is warm, dry, and intact. Peripheral IV in Right Forearm infiltrated, saline locked.  RESPIRATORY: Airway is open and patent. Bilateral chest rise and fall. Respirations are spontaneous, even and unlabored. Normal effort and rate noted. No accessory muscle use noted.   CARDIAC: Patient has a normal HR. No peripheral edema noted. Capillary refill <3 seconds.   ABDOMEN: Soft and non tender to palpation. No distention noted. Bowel sounds present in all 4 quadrants.   URINARY: Maradiaga Catheter in place, urine appears dinorah/yellow in color.  EXTREMITIES: No redness, heat, swelling, deformity, or pain.  PULSES: 2+ and equal in all extremities.   NEUROLOGIC: See Neuro Assessment.  MUSCULOSKELETAL: No obvious deformities noted.

## 2019-07-28 NOTE — ED NOTES
"Assumed care of patient.  Neuro critical care staff at bedside performing neuro exam.  Patient's relatives at bedside.  They state that the patient "lives wherever and with whoever he wants" but are unsure of his current living arrangements.  They live in [place].  Patient is able to turn from side to side on his own in bed and makes incomprehensible sounds for his speech.  Patient following commands at this time.  See neuro assessment in flowsheets for more detail.  All necessary monitoring equipment in place.  Will continue to monitor.    "

## 2019-07-28 NOTE — HPI
65 y/o amle who was last seen normal around 2200 by neighbours found to have aphasia, right sided weakness. He was taken to  where stroke code was called and telemedicine was done with Dr Parra and with no acute process seen on CT scan and no contraindication recommendation was to give IV TPA and transfer to [facility]. No family with patient and no histroy in medical records.   Upon arrival CTA head and neck multiphase done and reveals multiple old infarcts as well as questionable M2 occlusion acute vs chronic will need MRI acte intervention procotol.   Patient exam is inconsistent not following commands, aphasic, moving right side at times and others lets fall.   Likely etiology of stroke is arrhythmia in the setting of drug abuse.

## 2019-07-28 NOTE — HPI
Mr. Kincaid is a 63 y/o male with PMH of multiple previous CVAs, COPD and HTN who presents to [facility] as a [place] s/p TPA administration for L MCA syndrome. He was found by neighbors at approximately 2230 last night (7/27) to have acute onset aphasia and RSW. He was taken to [facility]. He was seen by telestroke and TPA was given. He was then transferred to [facility] for possible thrombectomy. CTA on arrival showed possible LM2 occlusion of questionable chronicity as well as multiple strokes. MRI stroke protocol was therefore obtained, but no proximal occlusion seen for intervention. Patient will be admitted to [facility] for post-TPA care and stroke work-up/managament.     Per daughters at bedside, patient has history of smoking, alcohol abuse, and crack cocaine use. He has been intermittently homeless over the last several years. He has baseline dysarthria from previous strokes and lower extremity weakness/pain and intermittently uses a walker/cane to ambulate.

## 2019-07-28 NOTE — CONSULTS
[facility]  Vascular Neurology  Comprehensive Stroke Center  Consult Note    Inpatient consult to Vascular (Stroke) Neurology  Consult performed by: Danyell Newman NP  Consult ordered by: Caroline Colin MD  Reason for consult:  L MCA stroke        Assessment/Plan:     Patient is a 64 y.o. year old male with:    * Stroke due to embolism of left middle cerebral artery  63 y/o male with L MCA syndrome received IV TPA    Antithrombotics: once out of tpa window may begin ASA 81 mg daily    Statins: Lipitor 40 mg daily    Aggressive risk factor modification: HTN     Rehab efforts: The patient has been evaluated by a stroke team provider and the therapy needs have been fully considered based off the presenting complaints and exam findings. The following therapy evaluations are needed: PT evaluate and treat, OT evaluate and treat, SLP evaluate and treat, PM&R evaluate for appropriate placement    Diagnostics ordered/pending: HgbA1C to assess blood glucose levels, Lipid Profile to assess cholesterol levels, TTE to assess cardiac function/status , TSH to assess thyroid function    VTE prophylaxis: SCD's saman begin heparin 5000 units sc Q8H on 7-29-19 at 0600    BP parameters: Infarct: Post tPA, SBP <180        HTN (hypertension)  Stroke risk factor  SBP <180 due to TPA    Aphasia  Due to stroke  Aggressive therapy    Received intravenous tissue plasminogen activator (t-PA) in emergency department  Close monitoring in NCC 24 hours post administration             STROKE DOCUMENTATION     Acute Stroke Times   Last Known Normal Date: 07/27/19  Last Known Normal Time: 2200  Symptom Onset Date: 07/27/19  Symptom Onset Time: 2200  Stroke Team Called Date: 07/27/19  Stroke Team Called Time: 2249  Stroke Team Arrival Date: 07/28/19  Stroke Team Arrival Time: 0145  CT Interpretation Time: 2251  Decision to Treat Time for Alteplase: 2320(bolus given)    NIH Scale:  1a. Level of Consciousness: 1-->Not alert,  but arousable by minor stimulation to obey, answer, or respond  1b. LOC Questions: 2-->Answers neither question correctly  1c. LOC Commands: 2-->Performs neither task correctly  2. Best Gaze: 0-->Normal  3. Visual: 2-->Complete hemianopia  4. Facial Palsy: 2-->Partial paralysis (total or near-total paralysis of lower face)  5a. Motor Arm, Left: 0-->No drift, limb holds 90 (or 45) degrees for full 10 secs  5b. Motor Arm, Right: 3-->No effort against gravity, limb falls  6a. Motor Leg, Left: 0-->No drift, leg holds 30 degree position for full 5 secs  6b. Motor Leg, Right: 3-->No effort against gravity, leg falls to bed immediately  7. Limb Ataxia: 0-->Absent  8. Sensory: 2-->Severe to total sensory loss, patient is not aware of being touched in the face, arm, and leg  9. Best Language: 2-->Severe aphasia, all communication is through fragmentary expression, great need for inference, questioning, and guessing by the listener. Range of information that can be exchanged is limited, listener carries burden of. . . (see row details)  10. Dysarthria: 2-->Severe dysarthria, patients speech is so slurred as to be unintelligible in the absence of or out of proportion to any dysphasia, or is mute/anarthric  11. Extinction and Inattention (formerly Neglect): 0-->No abnormality  Total (NIH Stroke Scale): 21    Modified Alyssa Score: 0  Nemours Coma Scale:11   ABCD2 Score:    [id number] Score:   HAS -BLED Score:   ICH Score:   Hunt & Barksdale Classification:       Thrombolysis Candidate? Yes, given prior to arrival at outside hospital    Delays to Thrombolysis?  No    Interventional Revascularization Candidate?   Is the patient eligible for mechanical endovascular reperfusion (JUSTINE)?  No; No large vessel occlusion      Hemorrhagic change of an Ischemic Stroke: Does this patient have an ischemic stroke with hemorrhagic changes? No     Subjective:     History of Present Illness:  65 y/o amle who was last seen normal around 2200 by  neighbours found to have aphasia, right sided weakness. He was taken to [facility] where stroke code was called and telemedicine was done with Dr Parra and with no acute process seen on CT scan and no contraindication recommendation was to give IV TPA and transfer to [facility]. No family with patient and no histroy in medical records.   Upon arrival CTA head and neck multiphase done and reveals multiple old infarcts as well as questionable M2 occlusion acute vs chronic will need MRI acte intervention procotol.   Patient exam is inconsistent not following commands, aphasic, moving right side at times and others lets fall.   Since no family available will need to clear for MRI with xrays        No past medical history on file.  No past surgical history on file.  No family history on file.  Social History     Tobacco Use    Smoking status: Not on file   Substance Use Topics    Alcohol use: Not on file    Drug use: Not on file     Review of patient's allergies indicates:  No Known Allergies    Medications: I have reviewed the current medication administration record.      (Not in a hospital admission)    Review of Systems   Unable to perform ROS: Patient nonverbal     Objective:     Vital Signs (Most Recent):  Temp: 98.4 °F (36.9 °C) (07/28/19 0144)  Pulse: 64 (07/28/19 0345)  Resp: 19 (07/28/19 0320)  BP: 137/80 (07/28/19 0345)  SpO2: 100 % (07/28/19 0345)    Vital Signs Range (Last 24H):  Temp:  [98.4 °F (36.9 °C)]   Pulse:  [64-94]   Resp:  [18-24]   BP: (137-184)/()   SpO2:  [95 %-100 %]     Physical Exam   Constitutional:   Thin elderly looking male   HENT:   Head: Normocephalic and atraumatic.   Eyes: Pupils are equal, round, and reactive to light.   Neck: Normal range of motion.   Cardiovascular: Normal rate.   Pulmonary/Chest: Effort normal.   Abdominal: Soft.   Neurological:   Drowsy, right sided weakness, aphasia   Skin: Skin is warm and dry.   Nursing note and vitals  reviewed.      Neurological Exam:   LOC: drowsy  Attention Span: poor  Language: Global aphasia  Articulation: Dysarthria  Orientation: Not oriented to person, place, and time  Visual Fields: Hemianopsia right  EOM (CN III, IV, VI): Full/intact  Pupils (CN II, III): PERRL  Facial Sensation (CN V): Normal  Facial Movement (CN VII): Lower facial weakness on the Right  Gag Reflex: present  Reflexes: flexor plantar responses bilaterally  Motor: Arm left  Normal 5/5  Leg left  Normal 5/5  Arm right  Paresis: 3/5  Leg right Paresis: 3/5  Cebellar: No evidence of appendicular or axial ataxia  Sensation: Pee-anesthesia right  Tone: Normal tone throughout      Laboratory:  CMP: No results for input(s): GLUCOSE, CALCIUM, ALBUMIN, PROT, NA, K, CO2, CL, BUN, CREATININE, ALKPHOS, ALT, AST, BILITOT in the last 24 hours.  CBC: No results for input(s): WBC, RBC, HGB, HCT, PLT, MCV, MCH, MCHC in the last 168 hours.  Lipid Panel: No results for input(s): CHOL, LDLCALC, HDL, TRIG in the last 168 hours.  Coagulation: No results for input(s): PT, INR, APTT in the last 168 hours.  Hgb A1C: No results for input(s): HGBA1C in the last 168 hours.  TSH: No results for input(s): TSH in the last 168 hours.    Diagnostic Results:      Brain imaging:      Vessel Imaging:  CTA head and neck multiphase 7-28-19 results:  Bilateral frontal lobe encephalomalacia, likely secondary to remote left TJ and bilateral anterior MCA distribution infarcts.    Multifocal areas of irregularity and narrowing involving the intracranial arteries, specifically the left TJ and bilateral M2 branches which may be related to remote insults/atherosclerosis.  No evidence of acute proximal/large vessel occlusion.  However, further evaluation with MRI is suggested for correlation and to exclude acute infarct.    Generalized cerebral volume loss and chronic microvascular ischemic changes.    MRI Brain acute interventional protocol 7-28-19 results:      Cardiac Evaluation:    EKG 7-28-19 pending      Danyell Newman NP  Comprehensive Stroke Center  Department of Vascular Neurology   [facility]

## 2019-07-29 PROBLEM — E44.0 MODERATE MALNUTRITION: Status: ACTIVE | Noted: 2019-07-29

## 2019-07-29 LAB
ALBUMIN SERPL BCP-MCNC: 2.9 G/DL (ref 3.5–5.2)
ALP SERPL-CCNC: 72 U/L (ref 55–135)
ALT SERPL W/O P-5'-P-CCNC: 11 U/L (ref 10–44)
ANION GAP SERPL CALC-SCNC: 10 MMOL/L (ref 8–16)
AST SERPL-CCNC: 13 U/L (ref 10–40)
AV INDEX (PROSTH): 0.78
AV MEAN GRADIENT: 3 MMHG
AV PEAK GRADIENT: 6 MMHG
AV VALVE AREA: 2.7 CM2
AV VELOCITY RATIO: 0.63
BASOPHILS # BLD AUTO: 0.02 K/UL (ref 0–0.2)
BASOPHILS NFR BLD: 0.4 % (ref 0–1.9)
BILIRUB SERPL-MCNC: 0.5 MG/DL (ref 0.1–1)
BSA FOR ECHO PROCEDURE: 1.83 M2
BUN SERPL-MCNC: 6 MG/DL (ref 8–23)
CALCIUM SERPL-MCNC: 8.9 MG/DL (ref 8.7–10.5)
CHLORIDE SERPL-SCNC: 104 MMOL/L (ref 95–110)
CO2 SERPL-SCNC: 24 MMOL/L (ref 23–29)
CREAT SERPL-MCNC: 0.7 MG/DL (ref 0.5–1.4)
CV ECHO LV RWT: 0.32 CM
DIFFERENTIAL METHOD: ABNORMAL
DOP CALC AO PEAK VEL: 1.18 M/S
DOP CALC AO VTI: 18.55 CM
DOP CALC LVOT AREA: 3.5 CM2
DOP CALC LVOT DIAMETER: 2.1 CM
DOP CALC LVOT PEAK VEL: 0.74 M/S
DOP CALC LVOT STROKE VOLUME: 50.13 CM3
DOP CALCLVOT PEAK VEL VTI: 14.48 CM
ECHO LV POSTERIOR WALL: 0.8 CM (ref 0.6–1.1)
EOSINOPHIL # BLD AUTO: 0.3 K/UL (ref 0–0.5)
EOSINOPHIL NFR BLD: 5.7 % (ref 0–8)
ERYTHROCYTE [DISTWIDTH] IN BLOOD BY AUTOMATED COUNT: 13 % (ref 11.5–14.5)
EST. GFR  (AFRICAN AMERICAN): >60 ML/MIN/1.73 M^2
EST. GFR  (NON AFRICAN AMERICAN): >60 ML/MIN/1.73 M^2
FRACTIONAL SHORTENING: 16 % (ref 28–44)
GLUCOSE SERPL-MCNC: 99 MG/DL (ref 70–110)
HCT VFR BLD AUTO: 33.9 % (ref 40–54)
HGB BLD-MCNC: 10.5 G/DL (ref 14–18)
IMM GRANULOCYTES # BLD AUTO: 0.03 K/UL (ref 0–0.04)
IMM GRANULOCYTES NFR BLD AUTO: 0.6 % (ref 0–0.5)
INTERVENTRICULAR SEPTUM: 0.9 CM (ref 0.6–1.1)
LA MAJOR: 4.76 CM
LA MINOR: 4.91 CM
LA WIDTH: 3.37 CM
LEFT ATRIUM SIZE: 3.04 CM
LEFT ATRIUM VOLUME INDEX: 22.9 ML/M2
LEFT ATRIUM VOLUME: 42.09 CM3
LEFT INTERNAL DIMENSION IN SYSTOLE: 4.2 CM (ref 2.1–4)
LEFT VENTRICLE DIASTOLIC VOLUME INDEX: 43.86 ML/M2
LEFT VENTRICLE DIASTOLIC VOLUME: 80.79 ML
LEFT VENTRICLE MASS INDEX: 80 G/M2
LEFT VENTRICLE SYSTOLIC VOLUME INDEX: 20.2 ML/M2
LEFT VENTRICLE SYSTOLIC VOLUME: 37.2 ML
LEFT VENTRICULAR INTERNAL DIMENSION IN DIASTOLE: 5 CM (ref 3.5–6)
LEFT VENTRICULAR MASS: 146.83 G
LYMPHOCYTES # BLD AUTO: 1.7 K/UL (ref 1–4.8)
LYMPHOCYTES NFR BLD: 34.3 % (ref 18–48)
MAGNESIUM SERPL-MCNC: 1.8 MG/DL (ref 1.6–2.6)
MCH RBC QN AUTO: 28.3 PG (ref 27–31)
MCHC RBC AUTO-ENTMCNC: 31 G/DL (ref 32–36)
MCV RBC AUTO: 91 FL (ref 82–98)
MONOCYTES # BLD AUTO: 0.5 K/UL (ref 0.3–1)
MONOCYTES NFR BLD: 9.3 % (ref 4–15)
NEUTROPHILS # BLD AUTO: 2.5 K/UL (ref 1.8–7.7)
NEUTROPHILS NFR BLD: 49.7 % (ref 38–73)
NRBC BLD-RTO: 0 /100 WBC
PHOSPHATE SERPL-MCNC: 3.1 MG/DL (ref 2.7–4.5)
PLATELET # BLD AUTO: 339 K/UL (ref 150–350)
PMV BLD AUTO: 10.1 FL (ref 9.2–12.9)
POCT GLUCOSE: 63 MG/DL (ref 70–110)
POCT GLUCOSE: 71 MG/DL (ref 70–110)
POCT GLUCOSE: 79 MG/DL (ref 70–110)
POCT GLUCOSE: 84 MG/DL (ref 70–110)
POCT GLUCOSE: 87 MG/DL (ref 70–110)
POCT GLUCOSE: 93 MG/DL (ref 70–110)
POTASSIUM SERPL-SCNC: 3.3 MMOL/L (ref 3.5–5.1)
POTASSIUM SERPL-SCNC: 3.6 MMOL/L (ref 3.5–5.1)
PROT SERPL-MCNC: 6.4 G/DL (ref 6–8.4)
RA MAJOR: 4.17 CM
RA PRESSURE: 3 MMHG
RA WIDTH: 2.67 CM
RBC # BLD AUTO: 3.71 M/UL (ref 4.6–6.2)
RIGHT VENTRICULAR END-DIASTOLIC DIMENSION: 3.27 CM
SINUS: 3.31 CM
SODIUM SERPL-SCNC: 138 MMOL/L (ref 136–145)
TDI LATERAL: 0.1 M/S
TDI SEPTAL: 0.06 M/S
TDI: 0.08 M/S
TRICUSPID ANNULAR PLANE SYSTOLIC EXCURSION: 1.96 CM
WBC # BLD AUTO: 5.08 K/UL (ref 3.9–12.7)

## 2019-07-29 PROCEDURE — 94761 N-INVAS EAR/PLS OXIMETRY MLT: CPT

## 2019-07-29 PROCEDURE — 25000003 PHARM REV CODE 250: Performed by: PHYSICIAN ASSISTANT

## 2019-07-29 PROCEDURE — 99233 SBSQ HOSP IP/OBS HIGH 50: CPT | Mod: GC,,, | Performed by: PSYCHIATRY & NEUROLOGY

## 2019-07-29 PROCEDURE — 92610 EVALUATE SWALLOWING FUNCTION: CPT

## 2019-07-29 PROCEDURE — 99233 PR SUBSEQUENT HOSPITAL CARE,LEVL III: ICD-10-PCS | Mod: GC,,, | Performed by: PSYCHIATRY & NEUROLOGY

## 2019-07-29 PROCEDURE — 63600175 PHARM REV CODE 636 W HCPCS: Performed by: STUDENT IN AN ORGANIZED HEALTH CARE EDUCATION/TRAINING PROGRAM

## 2019-07-29 PROCEDURE — 63600175 PHARM REV CODE 636 W HCPCS: Performed by: PHYSICIAN ASSISTANT

## 2019-07-29 PROCEDURE — 84100 ASSAY OF PHOSPHORUS: CPT

## 2019-07-29 PROCEDURE — 85025 COMPLETE CBC W/AUTO DIFF WBC: CPT

## 2019-07-29 PROCEDURE — 92523 SPEECH SOUND LANG COMPREHEN: CPT

## 2019-07-29 PROCEDURE — 80053 COMPREHEN METABOLIC PANEL: CPT

## 2019-07-29 PROCEDURE — 83735 ASSAY OF MAGNESIUM: CPT

## 2019-07-29 PROCEDURE — 84132 ASSAY OF SERUM POTASSIUM: CPT

## 2019-07-29 PROCEDURE — 20600001 HC STEP DOWN PRIVATE ROOM

## 2019-07-29 RX ORDER — HEPARIN SODIUM 5000 [USP'U]/ML
5000 INJECTION, SOLUTION INTRAVENOUS; SUBCUTANEOUS EVERY 8 HOURS
Status: DISCONTINUED | OUTPATIENT
Start: 2019-07-29 | End: 2019-08-12 | Stop reason: HOSPADM

## 2019-07-29 RX ORDER — POTASSIUM CHLORIDE 7.45 MG/ML
10 INJECTION INTRAVENOUS
Status: COMPLETED | OUTPATIENT
Start: 2019-07-29 | End: 2019-07-29

## 2019-07-29 RX ORDER — NAPROXEN SODIUM 220 MG/1
81 TABLET, FILM COATED ORAL DAILY
Status: DISCONTINUED | OUTPATIENT
Start: 2019-07-29 | End: 2019-08-12 | Stop reason: HOSPADM

## 2019-07-29 RX ADMIN — SENNOSIDES,DOCUSATE SODIUM 1 TABLET: 8.6; 5 TABLET, FILM COATED ORAL at 09:07

## 2019-07-29 RX ADMIN — ATORVASTATIN CALCIUM 40 MG: 20 TABLET, FILM COATED ORAL at 09:07

## 2019-07-29 RX ADMIN — HEPARIN SODIUM 5000 UNITS: 5000 INJECTION, SOLUTION INTRAVENOUS; SUBCUTANEOUS at 06:07

## 2019-07-29 RX ADMIN — POTASSIUM CHLORIDE 10 MEQ: 10 INJECTION, SOLUTION INTRAVENOUS at 05:07

## 2019-07-29 RX ADMIN — POTASSIUM CHLORIDE 10 MEQ: 10 INJECTION, SOLUTION INTRAVENOUS at 03:07

## 2019-07-29 RX ADMIN — FOLIC ACID 1 MG: 1 TABLET ORAL at 09:07

## 2019-07-29 RX ADMIN — Medication 100 MG: at 09:07

## 2019-07-29 RX ADMIN — HEPARIN SODIUM 5000 UNITS: 5000 INJECTION, SOLUTION INTRAVENOUS; SUBCUTANEOUS at 09:07

## 2019-07-29 RX ADMIN — DEXTROSE: 5 SOLUTION INTRAVENOUS at 05:07

## 2019-07-29 RX ADMIN — ASPIRIN 81 MG CHEWABLE TABLET 81 MG: 81 TABLET CHEWABLE at 09:07

## 2019-07-29 RX ADMIN — HEPARIN SODIUM 5000 UNITS: 5000 INJECTION, SOLUTION INTRAVENOUS; SUBCUTANEOUS at 03:07

## 2019-07-29 NOTE — PLAN OF CARE
Problem: SLP Goal  Goal: SLP Goal  Rec cont npo with meds crushed in puree, strict aspiration precautions.  SLP to continue to follow. JOSE LUIS Graham, CCC/SLP  7/29/2019

## 2019-07-29 NOTE — PLAN OF CARE
Problem: Adult Inpatient Plan of Care  Goal: Plan of Care Review  Outcome: Ongoing (interventions implemented as appropriate)  Nutrition assessment completed. Please see RD note for details.    Recommendation/Intervention:  1. If able to advance diet, recommend Regular, Boost Plus (vanilla) TID with texture per SLP recommendations.      2. If unable to advance diet and enteral access gained, recommend starting TF.   Isosource 1.5 @ goal rate 60mL/hr.   - Initiate @ 10mL and increase by 10mL q4hrs, or as tolerated, until goal rate is reached.   - Hold for residuals >500mL.   - Provides 2160kcals, 98g protein and 1100mL free water.      RD to monitor.     Goals: Pt to receive nutrition by RD follow up  Nutrition Goal Status: new  Communication of RD Recs: reviewed with RN

## 2019-07-29 NOTE — ASSESSMENT & PLAN NOTE
65 y/o male with history of previous CVAs with residual dysarthria and impaired lower extremity function, HTN, alcohol and drug abuse presents to [facility] with LMCA syndrome, TPA administered. Not a thrombectomy candidate.   - Admit to NCC  - Vascular Neurology consult  - SBP goal 100-180  - Atorvastatin 40 mg daily  - MRI obtained to determine whether thrombectomy candidate.   - CTH this evening for post-TPA imaging   - Holding ASA and SQH during 24-hour TPA administration window, plan to begin tonight for secondary stroke prevention  - Stroke work-up, including TSH, lipid panel, hemoglobin A1c, 2D echo   - PT/OT/SLP

## 2019-07-29 NOTE — ASSESSMENT & PLAN NOTE
65 y/o male with L MCA syndrome received IV TPA    Antithrombotics: once out of tpa window may begin ASA 81 mg daily    Statins: Lipitor 40 mg daily    Aggressive risk factor modification: HTN     Rehab efforts: The patient has been evaluated by a stroke team provider and the therapy needs have been fully considered based off the presenting complaints and exam findings. The following therapy evaluations are needed: PT evaluate and treat, OT evaluate and treat, SLP evaluate and treat, PM&R evaluate for appropriate placement    Diagnostics ordered/pending: HgbA1C to assess blood glucose levels, Lipid Profile to assess cholesterol levels, TTE to assess cardiac function/status , TSH to assess thyroid function    VTE prophylaxis: SCD's saman begin heparin 5000 units sc Q8H on 7-29-19 at 0600    BP parameters: Goal -160       Admission

## 2019-07-29 NOTE — PROGRESS NOTES
8:00 -Transported pt to 2nd floor CT by bed x 1 RN and 1 PCT. Monitor and ambu bag transferred with pt.     8:30 - Pt back in room and on monitor. No acute events. VSS. Will continue to monitor.

## 2019-07-29 NOTE — HOSPITAL COURSE
Ayden Kincaid is a 64 y.o. male with PMHx of HTN, alcohol abuse, and cocaine abuse who was admitted for L MCA infarct and treated with tPA. CTA without LVO therefore patient was not a candidate for thrombectomy. Echo with EF of 55% and no LA enlargement. Etiology currently ESUS.  He was evaluated by PT/OT and SLP who recommended a dental soft diet with thin liquids and SNF placement. Patient had no signs or symptoms or behavior problems this admission related to drug or alcohol withdrawal.     Patient remained medically stable for discharge for quite some time. Discharge delayed due to SNF placement. Unable to find an accepting facility for patient due to his history of drug abuse. Patient was discharged home with family and home health. For secondary stroke prevention, patient will continue aspirin 81 mg daily and atorvastatin 40 mg daily. 30 day event monitor will be mailed to patients home due to etiology of ESUS. Patient was discharged home with family and home health. He will follow up in stroke clinic in 4-6 weeks.           7/29/2019- Patient more alert, mild aphasia and dysarthria, TTE normal LA, no wall motion abnormalities, continue ASA and statin, rehab placement  7/30/19 - discussed care with older daughter, cathi. Appears that patient is unable to safely return to prior home site due to drug abuse activity (cocaine- him and his family In the area). The daughter wishes to place him in a safer environment near her in [facility]  7/31/19 - no events, neurologically stable, working on placement vs 24 hour care  8/1 referrals sent to facilities, waiting acceptance  8/2/19- stable neuro exam. No events overnight. Education regarding drug abuse given and reinforced   8/5/2019- stable neuro exam. No events overnight.   8/7- spoke with daughter- will look into placement in facilities here in [place], but she is open to possibility of taking him home for care  8/8- will speak with daughter regarding  possible D/C home tomorrow  8/10- patient medically stable; difficulty following commands; case management working with family regarding placement

## 2019-07-29 NOTE — PT/OT/SLP EVAL
"Speech Language Pathology Evaluation  Cognitive/Bedside Swallow    Patient Name:  Ayden Kincaid   MRN:  [id number]  Admitting Diagnosis: Stroke due to embolism of left middle cerebral artery    Recommendations:                  General Recommendations:  Dysphagia therapy, Speech/language therapy and Cognitive-linguistic therapy  Diet recommendations:  NPO, NPO   Aspiration Precautions: HOB to 90 degrees, Ice chips sparingly, Meds crushed in puree and Strict aspiration precautions   General Precautions: Standard, aphasia, aspiration, fall, NPO  Communication strategies:  provide increased time to answer and go to room if call light pushed    History:     Past Medical History:   Diagnosis Date    HTN (hypertension)     Stroke     muliple as seen on CT scan       History reviewed. No pertinent surgical history.    Social History: Patient unable to state.  Per Epic, pt with h/o previous CVAs with residual dysarthria.      Subjective     "I don't know."     Pain/Comfort:  · Pain Rating 1: 0/10  · Pain Rating Post-Intervention 1: 0/10    Objective:     Cognitive Status:    Cont to assess 2/2 significant speech/language impairments      Receptive Language:   Comprehension:   Questions Simple yes/no 50%, undifferentiated  Commands  One step 0% in structured tasks, intermittently in function given max cues  Object identifications 0% in Fo 2    Expressive Language:  Verbal:    impaired, occasional generalized language.  Language was dysfluent with difficulty initiating.  Initial sound repetitions evident.  Pt wtih frequent "I don't know' response.  Cont to assess as intelligibility in unknown contexts was poor.   Automatic Speech  Counting approximation x2 given max cues  Naming Confrontation 0% despite max cues    Motor Speech:  Dysarthria, significant. Poor intelligibility in unknown context    Voice:   Quality Rough and gravely   Intensity low    Visual-Spatial:  cont to assess    Reading:   tba     Written Expression: "   tba    Oral Musculature Evaluation  · Oral Musculature: unable to assess due to poor participation/comprehension, facial asymmetry present, right weakness  · Dentition: scattered dentition, teeth in poor condition  · Secretion Management: adequate  · Mucosal Quality: adequate  · Oral Labial Strength and Mobility: impaired retraction, functional seal  · Lingual Strength and Mobility: impaired protrusion(mild deviation, max cues, unable to test strength )  · Buccal Strength and Mobility: decreased tone  · Volitional Cough: not elicited despite max cues  · Volitional Swallow: not elicited despite max cues  · Voice Prior to PO Intake: gravely, low intensity, clear, severe dysarthria    Bedside Swallow Eval:   Consistencies Assessed:  · Thin liquids 5 oz via spoon, cup, straw  · Nectar thick liquids 3 oz via cup  · Puree 4 oz   · Solids 1 mohini cracker portion     Oral Phase:   · Impaired rotational chew  · Slow oral transit time    Pharyngeal Phase:   · Coughing/choking x2/thin. x1 with nectar thick  · delayed swallow initiation and inconsistent   · multiple spontaneous swallows  · Inconsistent hyolaryngeal rise palpated   · Pt with grimace with swallow with solid, unable to describe sensation     Compensatory Strategies  · None    Treatment: Education provided re: role of SLP, aspiration risk, cont npo and POC.  Pt unable to verbalize understanding. Education to be ongoing.      Assessment:     Ayden Kincaid is a 64 y.o. male with an SLP diagnosis of Aphasia, Dysphagia and Dysarthria.     Goals:   Multidisciplinary Problems     SLP Goals        Problem: SLP Goal    Goal Priority Disciplines Outcome   SLP Goal     SLP    Description:  Speech Language Pathology Goals  Goals expected to be met by 8/5:   1. Pt will participate in ongoing assessment of swallow.   2. Pt will answer simple y/n questions with 70% accy with min cues.   3. Pt will follow simple commands with 60% accy given max cues.   4. Pt will complete auto  speech tasks with 50% accy given max cues.   5. Pt will id objects in field of 2 with 60% accy.                              Plan:     · Patient to be seen:  4 x/week   · Plan of Care expires:  08/28/19  · Plan of Care reviewed with:  patient   · SLP Follow-Up:  Yes       Discharge recommendations:  Discharge Facility/Level of Care Needs: (pending PT/OT)   Barriers to Discharge:  Level of Skilled Assistance Needed      Time Tracking:     SLP Treatment Date:   07/29/19  Speech Start Time:  0718  Speech Stop Time:  0749     Speech Total Time (min):  31 min    Billable Minutes: Eval 11  and Eval Swallow and Oral Function 20    JOSE LUIS Graham, CCC-SLP  07/29/2019

## 2019-07-29 NOTE — ASSESSMENT & PLAN NOTE
Malnutrition     Related to (etiology):  Unknown etiology, possible decreased po intake    Signs and Symptoms (as evidenced by):  Energy Intake: DELMA  Body Fat Depletion: mild and moderate depletion of orbitals, triceps and thoracic and lumbar region   Muscle Mass Depletion: mild and moderate depletion of clavicle region, scapular region and lower extremities , severe depletion in temples  Weight Loss: positive for weight loss, amt unknown per family    Interventions/Recommendations (treatment strategy):  Collaboration of care with providers    Nutrition Diagnosis Status:  New

## 2019-07-29 NOTE — SUBJECTIVE & OBJECTIVE
Neurologic Chief Complaint: left MCA - M2 occlusion    Subjective:     Interval History: Patient is seen for follow-up neurological assessment and treatment recommendations:     HPI, Past Medical, Family, and Social History remains the same as documented in the initial encounter.     Review of Systems   Constitutional: Positive for activity change.   HENT: Positive for trouble swallowing and voice change.    Respiratory: Negative.    Cardiovascular: Negative.    Genitourinary: Negative.    Musculoskeletal: Negative.    Neurological: Positive for facial asymmetry, speech difficulty and weakness.   Hematological: Negative.    Psychiatric/Behavioral: Positive for confusion.     Scheduled Meds:   aspirin  81 mg Oral Daily    atorvastatin  40 mg Oral Daily    folic acid  1 mg Oral Daily    heparin (porcine)  5,000 Units Subcutaneous Q8H    senna-docusate 8.6-50 mg  1 tablet Oral BID    thiamine  100 mg Oral Daily     Continuous Infusions:  PRN Meds:acetaminophen, hydrALAZINE, ondansetron, sodium chloride 0.9%    Objective:     Vital Signs (Most Recent):  Temp: 98.1 °F (36.7 °C) (07/29/19 1501)  Pulse: 65 (07/29/19 1701)  Resp: (!) 23 (07/29/19 1701)  BP: (!) 153/72 (07/29/19 1701)  SpO2: 97 % (07/29/19 1701)  BP Location: Left arm    Vital Signs Range (Last 24H):  Temp:  [97.7 °F (36.5 °C)-98.8 °F (37.1 °C)]   Pulse:  [49-78]   Resp:  [9-42]   BP: (108-200)/(61-98)   SpO2:  [97 %-100 %]   BP Location: Left arm    Physical Exam   Constitutional: He appears well-developed and well-nourished.   HENT:   Head: Normocephalic and atraumatic.   Eyes: Pupils are equal, round, and reactive to light.   Neck: Normal range of motion.   Cardiovascular: Normal rate and regular rhythm.   Pulmonary/Chest: Effort normal.   Abdominal: Soft.   Neurological: He displays abnormal reflex. A cranial nerve deficit is present.       Neurological Exam:   LOC: drowsy  Attention Span: poor  Language: aphasia  Articulation:  Dysarthria  Orientation: Not oriented to place, and time  Visual Fields: Hemianopsia right  EOM (CN III, IV, VI): Full/intact  Pupils (CN II, III): PERRL  Facial Sensation (CN V): Normal  Facial Movement (CN VII): Lower facial weakness on the right  Gag Reflex: present  Reflexes: flexor plantar responses bilaterally  Motor: Arm left  Normal 5/5  Leg left  Normal 5/5  Arm right  Paresis: 4/5  Leg right Paresis: 4/5  Cebellar: No evidence of appendicular or axial ataxia  Sensation: Pee-anesthesia right  Tone: Normal tone throughout    Laboratory:  CMP:   Recent Labs   Lab 07/29/19  0100 07/29/19  1118   CALCIUM 8.9  --    ALBUMIN 2.9*  --    PROT 6.4  --      --    K 3.3* 3.6   CO2 24  --      --    BUN 6*  --    CREATININE 0.7  --    ALKPHOS 72  --    ALT 11  --    AST 13  --    BILITOT 0.5  --      CBC:   Recent Labs   Lab 07/29/19  0100   WBC 5.08   RBC 3.71*   HGB 10.5*   HCT 33.9*      MCV 91   MCH 28.3   MCHC 31.0*     Lipid Panel:   Recent Labs   Lab 07/28/19 0458   CHOL 160   LDLCALC 110.6   HDL 33*   TRIG 82     Coagulation: No results for input(s): PT, INR, APTT in the last 168 hours.  Platelet Aggregation Study: No results for input(s): PLTAGG, PLTAGINTERP, PLTAGREGLACO, ADPPLTAGGREG in the last 168 hours.  Hgb A1C:   Recent Labs   Lab 07/28/19 0458   HGBA1C 4.8     TSH:   Recent Labs   Lab 07/28/19 0458   TSH 0.385*       Diagnostic Results     Brain Imaging   MRI Brain acute interventional protocol 7-28-19 results:      Acute infarct within the left postcentral gyrus of the parietal lobe.  No proximal arterial occlusion demonstrated on MRA.    Remote infarcts within the bilateral precentral gyri and left middle frontal gyrus, consistent with remote bilateral MCA and left TJ infarcts.    Vessel Imaging:  CTA head and neck multiphase 7-28-19 results:  Bilateral frontal lobe encephalomalacia, likely secondary to remote left TJ and bilateral anterior MCA distribution  infarcts.    Multifocal areas of irregularity and narrowing involving the intracranial arteries, specifically the left TJ and bilateral M2 branches which may be related to remote insults/atherosclerosis.  No evidence of acute proximal/large vessel occlusion.  However, further evaluation with MRI is suggested for correlation and to exclude acute infarct.    Generalized cerebral volume loss and chronic microvascular ischemic changes.     Cardiac Imaging:    · Normal left ventricular systolic function. The estimated ejection fraction is 55%  · Normal LV diastolic function.  · Normal right ventricular systolic function.  · Normal central venous pressure (3 mm Hg).

## 2019-07-29 NOTE — PLAN OF CARE
Problem: Adult Inpatient Plan of Care  Goal: Plan of Care Review  POC reviewed with pt and family at 1400. Pt unable to verbalize understanding d/t global aphasia. Questions and concerns addressed with daughters and in agreement with POC. No acute events today. D5 gtt d/c. Pt remains NPO but can have meds crushed in pudding per ST. Sys BP <180 maintained without PRN meds. Pt progressing toward goals. Will continue to monitor. See flowsheets for full assessment and VS info.

## 2019-07-29 NOTE — PROGRESS NOTES
[facility]  Neurocritical Care  Progress Note    Admit Date: 7/28/2019  Service Date: 07/29/2019  Length of Stay: 1    Subjective:     Chief Complaint: Stroke due to embolism of left middle cerebral artery    History of Present Illness: Mr. Kincaid is a 65 y/o male with PMH of multiple previous CVAs, COPD and HTN who presents to [facility] as a transfer s/p TPA administration for L MCA syndrome. He was found by neighbors at approximately 2230 last night (7/27) to have acute onset aphasia and RSW. He was taken to [facility]. He was seen by telestroke and TPA was given. He was then transferred to [facility] for possible thrombectomy. CTA on arrival showed possible LM2 occlusion of questionable chronicity as well as multiple strokes. MRI stroke protocol was therefore obtained, but no proximal occlusion seen for intervention. Patient will be admitted to [facility] for post-TPA care and stroke work-up/managament.     Per daughters at bedside, patient has history of smoking, alcohol abuse, and crack cocaine use. He has been intermittently homeless over the last several years. He has baseline dysarthria from previous strokes and lower extremity weakness/pain and intermittently uses a walker/cane to ambulate.     Hospital Course: 7:29 Admitted in [facility] s/p TPA for post TPA monitoring    Interval History:    S/P TPA monitoring  NAEON    Review of Systems   Unable to perform ROS: Patient nonverbal       Objective:     Vitals:  Temp: 97.9 °F (36.6 °C)  Pulse: (!) 53  Rhythm: atrial rhythm  BP: (!) 162/85  MAP (mmHg): 118  Resp: 11  SpO2: 100 %  O2 Device (Oxygen Therapy): room air    Temp  Min: 97.5 °F (36.4 °C)  Max: 98.8 °F (37.1 °C)  Pulse  Min: 52  Max: 84  BP  Min: 101/60  Max: 200/98  MAP (mmHg)  Min: 85  Max: 140  Resp  Min: 9  Max: 42  SpO2  Min: 97 %  Max: 100 %    07/28 0701 - 07/29 0700  In: 1600 [I.V.:1200]  Out: 3370 [Urine:3370]           Physical Exam  Constitutional: He is alert, aphasic, He  appears well-developed and well-nourished.   HENT:   Head: Normocephalic and atraumatic.   Eyes: Pupils are equal, round, and reactive to light.   Neck: Normal range of motion. Neck supple.   Cardiovascular: Normal rate, regular rhythm and normal heart sounds.    Pulmonary/Chest: Effort normal and breath sounds normal.   Abdominal: Soft. Bowel sounds are normal.   Musculoskeletal: Normal range of motion.   Neurological:   -He is alert and aphasic, does not follow commands   -CN: not intact: Has a right facial droop, left gaze preference  -[id number]  -Strength: Full strength on right, moves left side spontaneously  LUE: does not oppose gravity  LLE oppses gravity but not resistance  sensation: withdraws from pain  Skin: Skin is warm and dry.   Psychiatric: Unable to test mood and affect. Orientation, language or coordination and gait    Medications:  Continuous  dextrose 5 % Last Rate: 100 mL/hr at 07/29/19 0605   Scheduled  aspirin 81 mg Daily   atorvastatin 40 mg Daily   folic acid 1 mg Daily   heparin (porcine) 5,000 Units Q8H   senna-docusate 8.6-50 mg 1 tablet BID   thiamine 100 mg Daily   PRN  acetaminophen 650 mg Q6H PRN   hydrALAZINE 10 mg Q6H PRN   ondansetron 4 mg Q6H PRN   sodium chloride 0.9% 10 mL PRN     Today I personally reviewed pertinent medications, lines/drains/airways, imaging, cardiology results, laboratory results, microbiology results,     Diet  Diet NPO  Diet NPO        Assessment/Plan:     Neuro  * Stroke due to embolism of left middle cerebral artery  65 y/o male with history of previous CVAs with residual dysarthria and impaired lower extremity function, HTN, alcohol and drug abuse presents to [facility] with LMCA syndrome, TPA administered. Not a thrombectomy candidate.   - Admit to NCC  - Vascular Neurology consult  - SBP goal 100-180  - Atorvastatin 40 mg daily  - MRI obtained to determine whether thrombectomy candidate.   - CTH this evening for post-TPA imaging   - Holding ASA and SQH during  24-hour TPA administration window, plan to begin tonight for secondary stroke prevention  - Stroke work-up, including TSH, lipid panel, hemoglobin A1c, 2D echo   - PT/OT/SLP    Cytotoxic cerebral edema  From ischemic stroke  - Monitor with brain imaging     Aphasia  From LMCA stroke     Psychiatric  Alcohol abuse  History of, per daughters  - Thiamine and folic acid daily   - Utox Cocaine present    Cardiac/Vascular  Essential hypertension  SBP goal 100-180  - 2D echo pending          The patient is being Prophylaxed for:  Venous Thromboembolism with: Mechanical or Chemical  Stress Ulcer with: Not Applicable   Ventilator Pneumonia with: not applicable    Activity Orders          Diet NPO: NPO starting at 07/28 0426        Full Code    Juan Luis Jacques MD  Neurocritical Care  [facility]

## 2019-07-29 NOTE — PROGRESS NOTES
[facility]  Vascular Neurology  Comprehensive Stroke Center  Progress Note    Assessment/Plan:     * Stroke due to embolism of left middle cerebral artery  63 y/o male with L MCA syndrome received IV TPA    Antithrombotics: once out of tpa window may begin ASA 81 mg daily    Statins: Lipitor 40 mg daily    Aggressive risk factor modification: HTN     Rehab efforts: The patient has been evaluated by a stroke team provider and the therapy needs have been fully considered based off the presenting complaints and exam findings. The following therapy evaluations are needed: PT evaluate and treat, OT evaluate and treat, SLP evaluate and treat, PM&R evaluate for appropriate placement    Diagnostics ordered/pending: HgbA1C to assess blood glucose levels, Lipid Profile to assess cholesterol levels, TTE to assess cardiac function/status , TSH to assess thyroid function    VTE prophylaxis: SCD's saman begin heparin 5000 units sc Q8H on 7-29-19 at 0600    BP parameters: Goal -160        Essential hypertension  Stroke risk factor  SBP <180 due to TPA    Aphasia  Due to stroke  Aggressive therapy    Received intravenous tissue plasminogen activator (t-PA) in emergency department  Close monitoring in NCC 24 hours post administration              7/29/2019- Patient more alert, mild aphasia and dysarthria, TTE normal LA, no wall motion abnormalities, continue ASA and statin, rehab placement    STROKE DOCUMENTATION   Acute Stroke Times   Last Known Normal Date: 07/27/19  Last Known Normal Time: 2200  Symptom Onset Date: 07/27/19  Symptom Onset Time: 2200  Stroke Team Called Date: 07/27/19  Stroke Team Called Time: 2249  Stroke Team Arrival Date: 07/28/19  Stroke Team Arrival Time: 0145  CT Interpretation Time: 2251  Decision to Treat Time for Alteplase: 2320(bolus given)    NIH Scale:  1a. Level of Consciousness: 0-->Alert, keenly responsive  1b. LOC Questions: 1-->Answers one question correctly  1c. LOC  Commands: 0-->Performs both tasks correctly  2. Best Gaze: 1-->Partial gaze palsy, gaze is abnormal in one or both eyes, but forced deviation or total gaze paresis is not present  3. Visual: 1-->Partial hemianopia  4. Facial Palsy: 2-->Partial paralysis (total or near-total paralysis of lower face)  5a. Motor Arm, Left: 0-->No drift, limb holds 90 (or 45) degrees for full 10 secs  5b. Motor Arm, Right: 0-->No drift, limb holds 90 (or 45) degrees for full 10 secs  6a. Motor Leg, Left: 0-->No drift, leg holds 30 degree position for full 5 secs  6b. Motor Leg, Right: 0-->No drift, leg holds 30 degree position for full 5 secs  7. Limb Ataxia: 0-->Absent  8. Sensory: 0-->Normal, no sensory loss  9. Best Language: 1-->Mild-to-moderate aphasia, some obvious loss of fluency or facility of comprehension, without significant limitation on ideas expressed or form of expression. Reduction of speech and/or comprehension, however, makes conversation. . . (see row details)  10. Dysarthria: 2-->Severe dysarthria, patients speech is so slurred as to be unintelligible in the absence of or out of proportion to any dysphasia, or is mute/anarthric  11. Extinction and Inattention (formerly Neglect): 0-->No abnormality  Total (NIH Stroke Scale): 8       Modified [place] Score: 0  [place] Coma Scale:    ABCD2 Score:    [id number] Score:   HAS -BLED Score:   ICH Score:   Hunt & Barksdale Classification:      Hemorrhagic change of an Ischemic Stroke: Does this patient have an ischemic stroke with hemorrhagic changes? No     Neurologic Chief Complaint: left MCA - M2 occlusion    Subjective:     Interval History: Patient is seen for follow-up neurological assessment and treatment recommendations:     HPI, Past Medical, Family, and Social History remains the same as documented in the initial encounter.     Review of Systems   Constitutional: Positive for activity change.   HENT: Positive for trouble swallowing and voice change.    Respiratory:  Negative.    Cardiovascular: Negative.    Genitourinary: Negative.    Musculoskeletal: Negative.    Neurological: Positive for facial asymmetry, speech difficulty and weakness.   Hematological: Negative.    Psychiatric/Behavioral: Positive for confusion.     Scheduled Meds:   aspirin  81 mg Oral Daily    atorvastatin  40 mg Oral Daily    folic acid  1 mg Oral Daily    heparin (porcine)  5,000 Units Subcutaneous Q8H    senna-docusate 8.6-50 mg  1 tablet Oral BID    thiamine  100 mg Oral Daily     Continuous Infusions:  PRN Meds:acetaminophen, hydrALAZINE, ondansetron, sodium chloride 0.9%    Objective:     Vital Signs (Most Recent):  Temp: 98.1 °F (36.7 °C) (07/29/19 1501)  Pulse: 65 (07/29/19 1701)  Resp: (!) 23 (07/29/19 1701)  BP: (!) 153/72 (07/29/19 1701)  SpO2: 97 % (07/29/19 1701)  BP Location: Left arm    Vital Signs Range (Last 24H):  Temp:  [97.7 °F (36.5 °C)-98.8 °F (37.1 °C)]   Pulse:  [49-78]   Resp:  [9-42]   BP: (108-200)/(61-98)   SpO2:  [97 %-100 %]   BP Location: Left arm    Physical Exam   Constitutional: He appears well-developed and well-nourished.   HENT:   Head: Normocephalic and atraumatic.   Eyes: Pupils are equal, round, and reactive to light.   Neck: Normal range of motion.   Cardiovascular: Normal rate and regular rhythm.   Pulmonary/Chest: Effort normal.   Abdominal: Soft.   Neurological: He displays abnormal reflex. A cranial nerve deficit is present.       Neurological Exam:   LOC: drowsy  Attention Span: poor  Language: aphasia  Articulation: Dysarthria  Orientation: Not oriented to place, and time  Visual Fields: Hemianopsia right  EOM (CN III, IV, VI): Full/intact  Pupils (CN II, III): PERRL  Facial Sensation (CN V): Normal  Facial Movement (CN VII): Lower facial weakness on the right  Gag Reflex: present  Reflexes: flexor plantar responses bilaterally  Motor: Arm left  Normal 5/5  Leg left  Normal 5/5  Arm right  Paresis:  4/5  Leg right Paresis:  4/5  Cebellar: No evidence of  appendicular or axial ataxia  Sensation: Pee-anesthesia right  Tone: Normal tone throughout    Laboratory:  CMP:   Recent Labs   Lab 07/29/19  0100 07/29/19  1118   CALCIUM 8.9  --    ALBUMIN 2.9*  --    PROT 6.4  --      --    K 3.3* 3.6   CO2 24  --      --    BUN 6*  --    CREATININE 0.7  --    ALKPHOS 72  --    ALT 11  --    AST 13  --    BILITOT 0.5  --      CBC:   Recent Labs   Lab 07/29/19  0100   WBC 5.08   RBC 3.71*   HGB 10.5*   HCT 33.9*      MCV 91   MCH 28.3   MCHC 31.0*     Lipid Panel:   Recent Labs   Lab 07/28/19  0458   CHOL 160   LDLCALC 110.6   HDL 33*   TRIG 82     Coagulation: No results for input(s): PT, INR, APTT in the last 168 hours.  Platelet Aggregation Study: No results for input(s): PLTAGG, PLTAGINTERP, PLTAGREGLACO, ADPPLTAGGREG in the last 168 hours.  Hgb A1C:   Recent Labs   Lab 07/28/19  0458   HGBA1C 4.8     TSH:   Recent Labs   Lab 07/28/19  0458   TSH 0.385*       Diagnostic Results     Brain Imaging   MRI Brain acute interventional protocol 7-28-19 results:      Acute infarct within the left postcentral gyrus of the parietal lobe.  No proximal arterial occlusion demonstrated on MRA.    Remote infarcts within the bilateral precentral gyri and left middle frontal gyrus, consistent with remote bilateral MCA and left TJ infarcts.    Vessel Imaging:  CTA head and neck multiphase 7-28-19 results:  Bilateral frontal lobe encephalomalacia, likely secondary to remote left TJ and bilateral anterior MCA distribution infarcts.    Multifocal areas of irregularity and narrowing involving the intracranial arteries, specifically the left TJ and bilateral M2 branches which may be related to remote insults/atherosclerosis.  No evidence of acute proximal/large vessel occlusion.  However, further evaluation with MRI is suggested for correlation and to exclude acute infarct.    Generalized cerebral volume loss and chronic microvascular ischemic changes.     Cardiac  Imaging:    · Normal left ventricular systolic function. The estimated ejection fraction is 55%  · Normal LV diastolic function.  · Normal right ventricular systolic function.  · Normal central venous pressure (3 mm Hg).        Néstor Anaya MD  [facility]  Department of Vascular Neurology   [facility]

## 2019-07-29 NOTE — PLAN OF CARE
Problem: Adult Inpatient Plan of Care  Goal: Plan of Care Review  Outcome: Ongoing (interventions implemented as appropriate)  POC reviewed with pt at 0500. Pt unable to verbalize understanding. KARINA swallowing screen passed. CTH completed. NIH completed. D5 gtt @ 100 ml/hr. Labs checked. Potassium replaced IV. No acute events overnight. Pt progressing toward goals. Will continue to monitor. See flowsheets for full assessment and VS info

## 2019-07-29 NOTE — CONSULTS
"  [facility]  Adult Nutrition  Consult Note    SUMMARY     Recommendations    Recommendation/Intervention:  1. If able to advance diet, recommend Regular, Boost Plus (vanilla) TID with texture per SLP recommendations.     2. If unable to advance diet and enteral access gained, recommend starting TF.   Isosource 1.5 @ goal rate 60mL/hr.   - Initiate @ 10mL and increase by 10mL q4hrs, or as tolerated, until goal rate is reached.   - Hold for residuals >500mL.   - Provides 2160kcals, 98g protein and 1100mL free water.     RD to monitor.    Goals: Pt to receive nutrition by RD follow up  Nutrition Goal Status: new  Communication of RD Recs: reviewed with RN    Reason for Assessment    Reason For Assessment: consult  Diagnosis: stroke/CVA  Relevant Medical History: CVA, COPD, HTN  Interdisciplinary Rounds: attended  General Information Comments: Pt remains NPO per SLP recommendations. Family at bedside this afternoon with limited nutrition information. Pt's 3 dtgrs report they haven't seen pt > 4 months, possible > 8 months. They do confirm pt has lost weight however they are unable to confirm amt. Pt unable to verbalize weight loss or po intake. NFPE completed - pt with mild to moderate muscle wasting in calves, clavicles, triceps and scapular, severe wasting in temples. Nourished in  interresous region. Pt meets criteria for malnutrition however unable to confirm if weight loss acute or chronic given vague timeline.  Nutrition Discharge Planning: unable to determine at this time    Nutrition Risk Screen    Nutrition Risk Screen: dysphagia or difficulty swallowing    Nutrition/Diet History    Spiritual, Cultural Beliefs, Buddhism Practices, Values that Affect Care: no(not unable to verbalize)  Factors Affecting Nutritional Intake: NPO, impaired cognitive status/motor control    Anthropometrics    Temp: 97.7 °F (36.5 °C)  Height Method: Estimated  Height: 5' 10" (177.8 cm)  Height (inches): 70 " in  Weight Method: Bed Scale  Weight: 67.6 kg (149 lb)  Weight (lb): 149 lb  Ideal Body Weight (IBW), Male: 166 lb  % Ideal Body Weight, Male (lb): 89.76 lb  BMI (Calculated): 21.4  BMI Grade: 18.5-24.9 - normal  Weight Loss: unintentional(DELMA amt per family and pt)       Lab/Procedures/Meds    Pertinent Labs Reviewed: reviewed  Pertinent Medications Reviewed: reviewed  Pertinent Medications Comments: D5, folic acid, thiamine      Estimated/Assessed Needs    Weight Used For Calorie Calculations: 67.6 kg (149 lb 0.5 oz)  Energy Calorie Requirements (kcal): 2028-2366  Energy Need Method: Kcal/kg(30-35kcal/kg)  Protein Requirements: 81-95g(1.2-1.4g/kg)  Weight Used For Protein Calculations: 67.6 kg (149 lb 0.5 oz)  Fluid Requirements (mL): 1mL/kcal or per MD     RDA Method (mL): 2028         Nutrition Prescription Ordered    Current Diet Order: NPO    Evaluation of Received Nutrient/Fluid Intake    IV Fluid (mL): 2400  % Intake of Estimated Energy Needs: 0 - 25 %  % Meal Intake: NPO    Nutrition Risk    Level of Risk/Frequency of Follow-up: high(f/u 2x/week)     Assessment and Plan    Moderate malnutrition  Malnutrition     Related to (etiology):  Unknown etiology, possible decreased po intake    Signs and Symptoms (as evidenced by):  Energy Intake: DELMA  Body Fat Depletion: mild and moderate depletion of orbitals, triceps and thoracic and lumbar region   Muscle Mass Depletion: mild and moderate depletion of clavicle region, scapular region and lower extremities , severe depletion in temples  Weight Loss: positive for weight loss, amt unknown per family    Interventions/Recommendations (treatment strategy):  Collaboration of care with providers    Nutrition Diagnosis Status:  New             Monitor and Evaluation    Food and Nutrient Intake: energy intake, food and beverage intake, enteral nutrition intake  Food and Nutrient Adminstration: diet order, enteral and parenteral nutrition administration  Anthropometric  Measurements: weight, weight change, body mass index  Biochemical Data, Medical Tests and Procedures: gastrointestinal profile, electrolyte and renal panel, glucose/endocrine profile, inflammatory profile, lipid profile  Nutrition-Focused Physical Findings: overall appearance     Malnutrition Assessment  Malnutrition Type: other (see comments)(DELMA)          Weight Loss (Malnutrition): (weight loss per family, DELMA amt)   Orbital Region (Subcutaneous Fat Loss): severe depletion  Upper Arm Region (Subcutaneous Fat Loss): mild depletion  Thoracic and Lumbar Region: moderate depletion   Jainism Region (Muscle Loss): moderate depletion  Clavicle Bone Region (Muscle Loss): moderate depletion  Clavicle and Acromion Bone Region (Muscle Loss): moderate depletion  Scapular Bone Region (Muscle Loss): moderate depletion  Dorsal Hand (Muscle Loss): well nourished  Patellar Region (Muscle Loss): mild depletion  Anterior Thigh Region (Muscle Loss): mild depletion  Posterior Calf Region (Muscle Loss): moderate depletion                 Nutrition Follow-Up    RD Follow-up?: Yes

## 2019-07-29 NOTE — SUBJECTIVE & OBJECTIVE
Interval History:    S/P TPA monitoring  NAEON    Review of Systems   Unable to perform ROS: Patient nonverbal       Objective:     Vitals:  Temp: 97.9 °F (36.6 °C)  Pulse: (!) 53  Rhythm: atrial rhythm  BP: (!) 162/85  MAP (mmHg): 118  Resp: 11  SpO2: 100 %  O2 Device (Oxygen Therapy): room air    Temp  Min: 97.5 °F (36.4 °C)  Max: 98.8 °F (37.1 °C)  Pulse  Min: 52  Max: 84  BP  Min: 101/60  Max: 200/98  MAP (mmHg)  Min: 85  Max: 140  Resp  Min: 9  Max: 42  SpO2  Min: 97 %  Max: 100 %    07/28 0701 - 07/29 0700  In: 1600 [I.V.:1200]  Out: 3370 [Urine:3370]           Physical Exam  Constitutional: He is alert, aphasic, He appears well-developed and well-nourished.   HENT:   Head: Normocephalic and atraumatic.   Eyes: Pupils are equal, round, and reactive to light.   Neck: Normal range of motion. Neck supple.   Cardiovascular: Normal rate, regular rhythm and normal heart sounds.    Pulmonary/Chest: Effort normal and breath sounds normal.   Abdominal: Soft. Bowel sounds are normal.   Musculoskeletal: Normal range of motion.   Neurological:   -He is alert and aphasic, does not follow commands   -CN: not intact: Has a right facial droop, left gaze preference  -[id number]  -Strength: Full strength on right, moves left side spontaneously  LUE: does not oppose gravity  LLE oppses gravity but not resistance  sensation: withdraws from pain  Skin: Skin is warm and dry.   Psychiatric: Unable to test mood and affect. Orientation, language or coordination and gait    Medications:  Continuous  dextrose 5 % Last Rate: 100 mL/hr at 07/29/19 0605   Scheduled  aspirin 81 mg Daily   atorvastatin 40 mg Daily   folic acid 1 mg Daily   heparin (porcine) 5,000 Units Q8H   senna-docusate 8.6-50 mg 1 tablet BID   thiamine 100 mg Daily   PRN  acetaminophen 650 mg Q6H PRN   hydrALAZINE 10 mg Q6H PRN   ondansetron 4 mg Q6H PRN   sodium chloride 0.9% 10 mL PRN     Today I personally reviewed pertinent medications, lines/drains/airways, imaging,  cardiology results, laboratory results, microbiology results,     Diet  Diet NPO  Diet NPO

## 2019-07-30 PROBLEM — F19.10 DRUG ABUSE: Status: ACTIVE | Noted: 2019-07-30

## 2019-07-30 LAB
ALBUMIN SERPL BCP-MCNC: 2.9 G/DL (ref 3.5–5.2)
ALP SERPL-CCNC: 74 U/L (ref 55–135)
ALT SERPL W/O P-5'-P-CCNC: 9 U/L (ref 10–44)
ANION GAP SERPL CALC-SCNC: 11 MMOL/L (ref 8–16)
AST SERPL-CCNC: 14 U/L (ref 10–40)
BASOPHILS # BLD AUTO: 0.03 K/UL (ref 0–0.2)
BASOPHILS NFR BLD: 0.6 % (ref 0–1.9)
BILIRUB SERPL-MCNC: 0.6 MG/DL (ref 0.1–1)
BUN SERPL-MCNC: 8 MG/DL (ref 8–23)
CALCIUM SERPL-MCNC: 9.3 MG/DL (ref 8.7–10.5)
CHLORIDE SERPL-SCNC: 102 MMOL/L (ref 95–110)
CO2 SERPL-SCNC: 23 MMOL/L (ref 23–29)
CREAT SERPL-MCNC: 0.8 MG/DL (ref 0.5–1.4)
DIFFERENTIAL METHOD: ABNORMAL
EOSINOPHIL # BLD AUTO: 0.2 K/UL (ref 0–0.5)
EOSINOPHIL NFR BLD: 4.4 % (ref 0–8)
ERYTHROCYTE [DISTWIDTH] IN BLOOD BY AUTOMATED COUNT: 12.9 % (ref 11.5–14.5)
EST. GFR  (AFRICAN AMERICAN): >60 ML/MIN/1.73 M^2
EST. GFR  (NON AFRICAN AMERICAN): >60 ML/MIN/1.73 M^2
GLUCOSE SERPL-MCNC: 69 MG/DL (ref 70–110)
HCT VFR BLD AUTO: 34.9 % (ref 40–54)
HGB BLD-MCNC: 11.2 G/DL (ref 14–18)
IMM GRANULOCYTES # BLD AUTO: 0.04 K/UL (ref 0–0.04)
IMM GRANULOCYTES NFR BLD AUTO: 0.7 % (ref 0–0.5)
LYMPHOCYTES # BLD AUTO: 1.9 K/UL (ref 1–4.8)
LYMPHOCYTES NFR BLD: 35.4 % (ref 18–48)
MAGNESIUM SERPL-MCNC: 2 MG/DL (ref 1.6–2.6)
MCH RBC QN AUTO: 28.3 PG (ref 27–31)
MCHC RBC AUTO-ENTMCNC: 32.1 G/DL (ref 32–36)
MCV RBC AUTO: 88 FL (ref 82–98)
MONOCYTES # BLD AUTO: 0.5 K/UL (ref 0.3–1)
MONOCYTES NFR BLD: 9.3 % (ref 4–15)
NEUTROPHILS # BLD AUTO: 2.7 K/UL (ref 1.8–7.7)
NEUTROPHILS NFR BLD: 49.6 % (ref 38–73)
NRBC BLD-RTO: 0 /100 WBC
PHOSPHATE SERPL-MCNC: 3.8 MG/DL (ref 2.7–4.5)
PLATELET # BLD AUTO: 434 K/UL (ref 150–350)
PMV BLD AUTO: 10.2 FL (ref 9.2–12.9)
POCT GLUCOSE: 72 MG/DL (ref 70–110)
POCT GLUCOSE: 73 MG/DL (ref 70–110)
POCT GLUCOSE: 82 MG/DL (ref 70–110)
POTASSIUM SERPL-SCNC: 3.5 MMOL/L (ref 3.5–5.1)
PROT SERPL-MCNC: 6.7 G/DL (ref 6–8.4)
RBC # BLD AUTO: 3.96 M/UL (ref 4.6–6.2)
SODIUM SERPL-SCNC: 136 MMOL/L (ref 136–145)
WBC # BLD AUTO: 5.4 K/UL (ref 3.9–12.7)

## 2019-07-30 PROCEDURE — 92526 ORAL FUNCTION THERAPY: CPT

## 2019-07-30 PROCEDURE — 97162 PT EVAL MOD COMPLEX 30 MIN: CPT

## 2019-07-30 PROCEDURE — 97167 OT EVAL HIGH COMPLEX 60 MIN: CPT

## 2019-07-30 PROCEDURE — 83735 ASSAY OF MAGNESIUM: CPT

## 2019-07-30 PROCEDURE — 99233 PR SUBSEQUENT HOSPITAL CARE,LEVL III: ICD-10-PCS | Mod: ,,, | Performed by: PSYCHIATRY & NEUROLOGY

## 2019-07-30 PROCEDURE — 99233 SBSQ HOSP IP/OBS HIGH 50: CPT | Mod: ,,, | Performed by: PSYCHIATRY & NEUROLOGY

## 2019-07-30 PROCEDURE — 80053 COMPREHEN METABOLIC PANEL: CPT

## 2019-07-30 PROCEDURE — 20600001 HC STEP DOWN PRIVATE ROOM

## 2019-07-30 PROCEDURE — 63600175 PHARM REV CODE 636 W HCPCS: Performed by: PHYSICIAN ASSISTANT

## 2019-07-30 PROCEDURE — 85025 COMPLETE CBC W/AUTO DIFF WBC: CPT

## 2019-07-30 PROCEDURE — 36415 COLL VENOUS BLD VENIPUNCTURE: CPT

## 2019-07-30 PROCEDURE — 92507 TX SP LANG VOICE COMM INDIV: CPT

## 2019-07-30 PROCEDURE — 25000003 PHARM REV CODE 250: Performed by: PHYSICIAN ASSISTANT

## 2019-07-30 PROCEDURE — 84100 ASSAY OF PHOSPHORUS: CPT

## 2019-07-30 PROCEDURE — 99222 PR INITIAL HOSPITAL CARE,LEVL II: ICD-10-PCS | Mod: ,,, | Performed by: NURSE PRACTITIONER

## 2019-07-30 PROCEDURE — 99222 1ST HOSP IP/OBS MODERATE 55: CPT | Mod: ,,, | Performed by: NURSE PRACTITIONER

## 2019-07-30 RX ADMIN — HEPARIN SODIUM 5000 UNITS: 5000 INJECTION, SOLUTION INTRAVENOUS; SUBCUTANEOUS at 05:07

## 2019-07-30 RX ADMIN — SENNOSIDES,DOCUSATE SODIUM 1 TABLET: 8.6; 5 TABLET, FILM COATED ORAL at 08:07

## 2019-07-30 RX ADMIN — HEPARIN SODIUM 5000 UNITS: 5000 INJECTION, SOLUTION INTRAVENOUS; SUBCUTANEOUS at 08:07

## 2019-07-30 RX ADMIN — ATORVASTATIN CALCIUM 40 MG: 20 TABLET, FILM COATED ORAL at 08:07

## 2019-07-30 RX ADMIN — ASPIRIN 81 MG CHEWABLE TABLET 81 MG: 81 TABLET CHEWABLE at 08:07

## 2019-07-30 RX ADMIN — Medication 100 MG: at 08:07

## 2019-07-30 RX ADMIN — FOLIC ACID 1 MG: 1 TABLET ORAL at 08:07

## 2019-07-30 RX ADMIN — HEPARIN SODIUM 5000 UNITS: 5000 INJECTION, SOLUTION INTRAVENOUS; SUBCUTANEOUS at 12:07

## 2019-07-30 NOTE — HPI
Ayden Kincaid is a 64-year-old male with PMHx of HTN.  Patient presented to [facility] with R sided weakness and aphasia .  CTH revealed no acute pathology.  A telemedicine consult was placed. tPA was recommended and administered. Transferred to [facility] on 7/28 for further evaluation and management.  Upon admission, CTA revealed Bilateral frontal lobe encephalomalacia, likely secondary to remote left TJ and bilateral anterior MCA distribution infarcts.  MRI brain revealed acute infarct within the left postcentral gyrus of the parietal lobe.  Hospital course complicted by dysarthria, dysphagia (NPO), and aphasia. Stroke team following.     Functional History: Patient lives in [place].  PLF limited 2/2 aphasia.

## 2019-07-30 NOTE — PT/OT/SLP EVAL
"Physical Therapy Evaluation     Patient Name: Ayden Kincaid  MRN: [id number]   Diagnosis: Stroke due to embolism of left middle cerebral artery    Recommendations:   Discharge Recommendations:  (refer to OT and SLP recs)   Discharge Equipment Recommendations: none   Barriers to Discharge: decreased caregiver assistance (needs 24 hr assistance)    Assessment:   Ayden Kincaid is a 64 y.o. male admitted with a medical diagnosis of Stroke due to embolism of left middle cerebral artery.  Prior to admit he was independent to [facility] independent with mobility.  he now presents with the following impairments/functional limitations: gait instability, impaired balance, impaired functional mobilty, impaired self care skills, decreased safety awareness, impaired cognition, decreased upper extremity function, impaired fine motor.   He exhibits an antalgic gait pattern and has well healed surgical scars on his L knee.  Despite altered gait kinematics, he ambulated with no LOB and no safety concerns.  However, because of his expressive and receptive aphasia, he will require 24 hr supervision after discharge.  PT will defer discharge recommendation to OT and SLP since his deficits are primarily in his RUE and speech/communicaiton.     Problem List:  gait instability, impaired balance, impaired functional mobilty, impaired self care skills, decreased safety awareness, impaired cognition, decreased upper extremity function, impaired fine motor  Rehab Prognosis:  good.  The patient would benefit from acute skilled PT services to address these deficits and maximize their functional independence.    History:     Past Medical History:   Diagnosis Date    HTN (hypertension)     Stroke     muliple as seen on CT scan       History reviewed. No pertinent surgical history.    Subjective   Patient comments/goals: "I don't know"  Pain/Comfort:  ·  Pain Rating 1: 0/10  · Pain Rating Post-Intervention 1: 0/10     Recent Vital Signs: (Last " "documentation)  Pulse: (!) 53 (07/30/19 1139)  BP: 120/73 (07/30/19 1139)  SpO2: 99 % (07/30/19 1139)     Living Environment:  Home: The patient lives from house to house (per patient).  He has been intermittently homeless over the past several years.    PLOF:  Independent to modified independent with mobility, occasionally uses a walker cane d/t L knee pain/weakness.   DME owned: none    Assistance Available: Upon discharge, patient will not have assistance.    Objective:   The patient had bed alarm, telemetry    General Precautions: aphasia, aspiration, fall  Recent Surgery: * No surgery found *    Recent Vital Signs:   Pulse: (!) 53 (07/30/19 1139)  BP: 120/73 (07/30/19 1139)  SpO2: 99 % (07/30/19 1139)    Physical Examination:   The patient was found supine in bed in NAD.  He responded "I don't know" to 95% of questions, including orientation questions..     Cognitive Function:  - Oriented to: not oriented  - Level of Alertness: awake and alert  - Follows Commands/attention: 1 step commands with functional cues, did not follow 1 step commands out of context   - Communication: exp and receptive aphasia  - Safety awareness/insight to disability: impaired  Musculoskeletal System  Upper Extremities:   PROM: WFL  Strength: RUE hemiparesis  Lower Extremities:  PROM: WFL  Strength: WFL  Neuromuscular System:  · Sensation: unable to assess  · Coordination: unable to assess  Posture and gross symmetry: no significant deficits  Vision:  NT    BALANCE:  Sitting: independent  Standing: supervision for dynamic standing balance    FUNCTIONAL MOBILITY ASSESSMENT:  Bed Mobility: performed with HOB flat  · Rolling/Turning R: mod I   · Rolling/Turning L: mod I   · Supine > sit: supervision   · Sit > supine: supervision  · Scooting EOB: supervision    Transfers:  · Sit <> stand transfer: supervision   · Bed <> chair transfer: supervision    Gait:   Gait x 200 feet with CGA to SBA with no assistive device  · Patient demonstrated " antalgic gait pattern (L knee) with resulting decreased time in L stance and shortened step length.   Well healed Surgical scars on L knee    Therapeutic Activities, Education, or Exercises:  Therapist educated the patient on the role of PT, POC, progress with mobility, goals, discharge planning, and level of assistance with transfers and Importance of progressive mobilization, out of bed positioning, and participation in therapy.  The therapist discussed the patients current mobility status, deficits, and level of assistance with RN.  Time was also provided for active listening,  therapeutic counseling and discussion of health disposition. The therapist answered questions to patient/familys satisfaction within scope of practice.   White board updated to reflect current level of assistance.     FUNCTIONAL OUTCOME MEASURES:  [facility]  Turning over in bed (including adjusting bedclothes, sheets and blankets)?: 4  Sitting down on and standing up from a chair with arms (e.g., wheelchair, bedside commode, etc.): 4  Moving from lying on back to sitting on the side of the bed?: 4  Moving to and from a bed to a chair (including a wheelchair)?: 4  Need to walk in hospital room?: 3  Climbing 3-5 steps with a railing?: 3  Basic Mobility Total Score: 22    Goals:     Multidisciplinary Problems     Physical Therapy Goals        Problem: Physical Therapy Goal    Goal Priority Disciplines Outcome Goal Variances Interventions   Physical Therapy Goal     PT, PT/OT Ongoing (interventions implemented as appropriate)     Description:    Goals to be met by 8/10/2019    1. Pt will perform rolling to the R and L with independence.   2. Pt will perform supine to sit from both sides of the bed with independence.  3. Pt will perform sit to supine with independence.  4. Pt will perform sit to stand transfers with independence.    5. Pt will perform bed <> chair transfers with independence.  6. Pt will perform gait x 400 feet with independence  while performing dynamic gait activities                      Plan:   During this hospitalization, patient will be seen 2 x/week for gait training, therapeutic activities, therapeutic exercises, neuromuscular re-education to address impairments and functional mobility deficits.   · Plan of Care Expires: 08/28/19   Plan of Care Reviewed with: patient    This plan of care has been discussed with the patient and/or family who were involved in its development and are in agreement with the identified goals and treatment plan.     Clinical Decision Making:   COMPLEXITY OF PT EXAMINATION:  HISTORY  - Comorbidities that affect the PT plan of care or the patient's ability to participate in/progress with therapy (see above)  - Personal Factors: Time since onset of injury, illness, diagnosis, or exacerbation, Cognitive status and safety concerns and Home environment and family support  EXAMINATION  - Body Systems: Cognition: a gross assessment of communication ability, orientation, level of consciousness, awareness of deficits, language, assessment of ability to make needs known, expected emotional or behavioral responses, learning style or preferences, Neuromuscular System: a general assessment of gross coordinated movement (ie. coordination, gait, balance, transitions, transfers), motor control, motor learning, or visual-perceptual skills  and Musculoskeletal System: the assessment of gross symmetry/posture, ROM, strength, spasticity, height, or weight  - Activity or participation limitations: Patient's level of alertness, cognitive status, orientation, communication, awareness of deficits or safety awareness and Status of current condition   CLINICAL PRESENTATION: Evolving/changing characteristics  - Varying levels of orientation, awareness, or cognitive performance  LEVEL OF COMPLEXITY: Moderate Complexity: (1 or more apply) the patient has at least 1-2 personal factors or comorbidities that impact the plan of care; the  examination addressed at least 3 body structures, functions, activity limitations, and/or participation restrictions; and the clinical presentation had evolving or changing characteristics    Time Tracking:   PT Received On:  07/30/19  PT Start Time:   0955    PT Stop Time:  1005  PT Total Time (min): 10 min      Billable Minutes: Evaluation 10    Alka Cortez, PT  7/30/2019  [phone] (pager)

## 2019-07-30 NOTE — SUBJECTIVE & OBJECTIVE
"Past Medical History:   Diagnosis Date    HTN (hypertension)     Stroke     muliple as seen on CT scan     History reviewed. No pertinent surgical history.  Review of patient's allergies indicates:  No Known Allergies    Scheduled Medications:    aspirin  81 mg Oral Daily    atorvastatin  40 mg Oral Daily    folic acid  1 mg Oral Daily    heparin (porcine)  5,000 Units Subcutaneous Q8H    senna-docusate 8.6-50 mg  1 tablet Oral BID    thiamine  100 mg Oral Daily       PRN Medications: acetaminophen, hydrALAZINE, ondansetron, sodium chloride 0.9%    Family History     Unknown 2/2 aphasia.         Tobacco Use    Smoking status: Unknown If Ever Smoked   Substance and Sexual Activity    Alcohol use: Yes    Drug use: Yes     Types: Benzodiazepines, "Crack" cocaine    Sexual activity: Not on file     Review of Systems   Reason unable to perform ROS: aphasia/dysarthria.     Objective:     Vital Signs (Most Recent):  Temp: 98 °F (36.7 °C) (07/30/19 0800)  Pulse: 67 (07/30/19 0806)  Resp: 16 (07/30/19 0800)  BP: (!) 121/90 (07/30/19 0800)  SpO2: (!) 94 % (07/30/19 0800)    Vital Signs (24h Range):  Temp:  [97 °F (36.1 °C)-98.5 °F (36.9 °C)] 98 °F (36.7 °C)  Pulse:  [55-85] 67  Resp:  [12-26] 16  SpO2:  [94 %-100 %] 94 %  BP: (106-182)/(72-98) 121/90     Body mass index is 21 kg/m².    Physical Exam   Constitutional: He appears well-developed and well-nourished. No distress.   HENT:   Head: Normocephalic and atraumatic.   Eyes: Right eye exhibits no discharge. Left eye exhibits no discharge.   Neck: Neck supple.   Cardiovascular: Normal rate and intact distal pulses.   Pulmonary/Chest: Effort normal. No respiratory distress.   Abdominal: Soft. He exhibits no distension.   Musculoskeletal: He exhibits no edema or deformity.   R sided weakness    Neurological: He is alert.   + aphasia and dysarthria  Follows some simple commands   Skin: Skin is warm and dry.   Psychiatric: He has a normal mood and affect. His " behavior is normal. His speech is slurred. Cognition and memory are impaired.     NEUROLOGICAL EXAMINATION:     MENTAL STATUS   Speech: slurred       Diagnostic Results:   Labs: Reviewed  ECG: Reviewed  X-Ray: Reviewed  CT: Reviewed  MRI: Reviewed

## 2019-07-30 NOTE — ASSESSMENT & PLAN NOTE
65 y/o male with L MCA syndrome received IV TPA    Antithrombotics: ASA 81 mg daily    Statins: Lipitor 40 mg daily    Aggressive risk factor modification: HTN     Rehab efforts: The patient has been evaluated by a stroke team provider and the therapy needs have been fully considered based off the presenting complaints and exam findings. The following therapy evaluations are needed: PT evaluate and treat, OT evaluate and treat, SLP evaluate and treat, PM&R evaluate for appropriate placement    Diagnostics ordered/pending: HgbA1C to assess blood glucose levels, Lipid Profile to assess cholesterol levels, TTE to assess cardiac function/status , TSH to assess thyroid function    VTE prophylaxis: SCD's saman begin heparin 5000 units sc Q8H on 7-29-19 at 0600    BP parameters: Goal -160

## 2019-07-30 NOTE — PLAN OF CARE
PCP- DR. KARI COSTA    PT HAS A [employer] HOME AND FAMILY SUPPORT ADULT DAUGHTERS. TRANSFERRED FROM [facility]    PHARMACY- [facility]     Coverage Name [employer] MEDICARE HMO Auth Phone     Employer Group   Group Number [id number]   Subscriber Name DENISA GONZALEZ Subscriber Number [id number]   Subscriber Date of Birth 1955 Subscriber -   Subscriber Address [address] Subscriber Phone [phone]     [place] [id number]            07/29/19 2326   Discharge Assessment   Assessment Type Discharge Planning Assessment   Confirmed/corrected address and phone number on facesheet? Yes   Assessment information obtained from? Patient   Expected Length of Stay (days) 5   Communicated expected length of stay with patient/caregiver yes   Prior to hospitilization cognitive status: Alert/Oriented   Prior to hospitalization functional status: Independent   Current cognitive status: Alert/Oriented   Current Functional Status: Assistive Equipment   Facility Arrived From: [facility]   Lives With grandchild(joshua)   Able to Return to Prior Arrangements yes   Is patient able to care for self after discharge? Unable to determine at this time (comments)   Patient's perception of discharge disposition home health   Readmission Within the Last 30 Days no previous admission in last 30 days   Patient currently being followed by outpatient case management? No   Patient currently receives any other outside agency services? No   Equipment Currently Used at Home none   Do you have any problems affording any of your prescribed medications? No   Is the patient taking medications as prescribed? yes   Does the patient have transportation home? Yes   Transportation Anticipated family or friend will provide   Does the patient receive services at the [facility]? No   Discharge Plan A Home with family   Discharge Plan B Home Health;Home with family

## 2019-07-30 NOTE — ASSESSMENT & PLAN NOTE
-MRI brain revealed acute infarct within the left postcentral gyrus of the parietal lobe.    See hospital course for functional, cognitive/speech/language, and nutrition/swallow status.      Recommendations  -  Encourage mobility, OOB in chair at least 3 hours per day, and early ambulation as appropriate   -  PT/OT evaluate and treat  -  SLP speech and cognitive evaluate and treat  -  Monitor sleep disturbances and establish consistent sleep-wake cycle  -  Monitor for bowel and bladder dysfunction  -  Monitor for shoulder pain, subluxation, & spasticity  -  Monitor for and prevent skin breakdown and pressure ulcers  · Early mobility, repositioning/weight shifting every 20-30 minutes when sitting, turn patient every 2 hours, proper mattress/overlay and chair cushioning, pressure relief/heel protector boots  -  DVT prophylaxis (if appropriate)  -  Reviewed discharge options (IP rehab, SNF, HH therapy, and OP therapy)

## 2019-07-30 NOTE — ASSESSMENT & PLAN NOTE
Per daughter, history of drug abuse   Cocaine - use with sister and ex wifes nephew   Daughter would like to remove him from this situation and have him closer to her in [place]

## 2019-07-30 NOTE — PROGRESS NOTES
[facility]  Vascular Neurology  Comprehensive Stroke Center  Progress Note    Assessment/Plan:     * Stroke due to embolism of left middle cerebral artery  63 y/o male with L MCA syndrome received IV TPA    Antithrombotics: ASA 81 mg daily    Statins: Lipitor 40 mg daily    Aggressive risk factor modification: HTN     Rehab efforts: The patient has been evaluated by a stroke team provider and the therapy needs have been fully considered based off the presenting complaints and exam findings. The following therapy evaluations are needed: PT evaluate and treat, OT evaluate and treat, SLP evaluate and treat, PM&R evaluate for appropriate placement    Diagnostics ordered/pending: HgbA1C to assess blood glucose levels, Lipid Profile to assess cholesterol levels, TTE to assess cardiac function/status , TSH to assess thyroid function    VTE prophylaxis: SCD's saman begin heparin 5000 units sc Q8H on 7-29-19 at 0600    BP parameters: Goal -160        Drug abuse  Per daughter, history of drug abuse   Cocaine - use with sister and ex wifes nephew   Daughter would like to remove him from this situation and have him closer to her in [place]     Cytotoxic cerebral edema  Area of cytotoxic cerebral edema identified when reviewing brain imaging in the territory of the L middle cerebral artery. There is not mass effect associated with it. We will continue to monitor the patients clinical exam for any worsening of symptoms which may indicate expansion of the stroke or the area of the edema resulting in the clinical change. The pattern is suggestive of ESUS etiology        Essential hypertension  Stroke risk factor  SBP <180 due to TPA    Aphasia  Due to stroke  Aggressive therapy    Received intravenous tissue plasminogen activator (t-PA) in emergency department  Close monitoring in [facility] 24 hours post administration     No complications          7/29/2019- Patient more alert, mild aphasia and dysarthria, TTE  normal LA, no wall motion abnormalities, continue ASA and statin, rehab placement  7/30/19 - discussed care with older daughter, cathi. Appears that patient is unable to safely return to prior home site due to drug abuse activity (cocaine- him and his family In the area). The daughter wishes to place him in a safer environment near her in [facility]    STROKE DOCUMENTATION   Acute Stroke Times   Last Known Normal Date: 07/27/19  Last Known Normal Time: 2200  Symptom Onset Date: 07/27/19  Symptom Onset Time: 2200  Stroke Team Called Date: 07/27/19  Stroke Team Called Time: 2249  Stroke Team Arrival Date: 07/28/19  Stroke Team Arrival Time: 0145  CT Interpretation Time: 2251  Decision to Treat Time for Alteplase: 2320(bolus given)    NIH Scale:  1a. Level of Consciousness: 0-->Alert, keenly responsive  1b. LOC Questions: 2-->Answers neither question correctly  1c. LOC Commands: 2-->Performs neither task correctly  2. Best Gaze: 1-->Partial gaze palsy, gaze is abnormal in one or both eyes, but forced deviation or total gaze paresis is not present  3. Visual: 0-->No visual loss  4. Facial Palsy: 2-->Partial paralysis (total or near-total paralysis of lower face)  5a. Motor Arm, Left: 0-->No drift, limb holds 90 (or 45) degrees for full 10 secs  5b. Motor Arm, Right: 1-->Drift, limb holds 90 (or 45) degrees, but drifts down before full 10 secs, does not hit bed or other support  6a. Motor Leg, Left: 0-->No drift, leg holds 30 degree position for full 5 secs  6b. Motor Leg, Right: 0-->No drift, leg holds 30 degree position for full 5 secs  7. Limb Ataxia: 0-->Absent  8. Sensory: 0-->Normal, no sensory loss  9. Best Language: 2-->Severe aphasia, all communication is through fragmentary expression, great need for inference, questioning, and guessing by the listener. Range of information that can be exchanged is limited, listener carries burden of. . . (see row details)  10. Dysarthria: 2-->Severe dysarthria, patients  speech is so slurred as to be unintelligible in the absence of or out of proportion to any dysphasia, or is mute/anarthric  11. Extinction and Inattention (formerly Neglect): 0-->No abnormality  Total (NIH Stroke Scale): 12       Modified Alyssa Score: 0  Rosalba Coma Scale:    ABCD2 Score:    [id number] Score:   HAS -BLED Score:   ICH Score:   Hunt & Barksdale Classification:      Hemorrhagic change of an Ischemic Stroke: Does this patient have an ischemic stroke with hemorrhagic changes? No     Neurologic Chief Complaint: left MCA - M2 occlusion    Subjective:     Interval History: Patient is seen for follow-up neurological assessment and treatment recommendations: discussed care with older daughter, cathi. Appears that patient is unable to safely return to prior home site due to drug abuse activity (cocaine- him and his family In the area). The daughter wishes to place him in a safer environment near her in [facility], Past Medical, Family, and Social History remains the same as documented in the initial encounter.     Review of Systems   Constitutional: Positive for activity change. Negative for fever.   HENT: Positive for trouble swallowing.    Neurological: Positive for facial asymmetry, speech difficulty and weakness.     Scheduled Meds:   aspirin  81 mg Oral Daily    atorvastatin  40 mg Oral Daily    folic acid  1 mg Oral Daily    heparin (porcine)  5,000 Units Subcutaneous Q8H    senna-docusate 8.6-50 mg  1 tablet Oral BID    thiamine  100 mg Oral Daily     Continuous Infusions:  PRN Meds:acetaminophen, hydrALAZINE, ondansetron, sodium chloride 0.9%    Objective:     Vital Signs (Most Recent):  Temp: 96.5 °F (35.8 °C) (07/30/19 1612)  Pulse: 67 (07/30/19 1615)  Resp: 17 (07/30/19 1612)  BP: 113/71 (07/30/19 1612)  SpO2: (!) 94 % (07/30/19 1612)  BP Location: Left arm    Vital Signs Range (Last 24H):  Temp:  [96.5 °F (35.8 °C)-98.5 °F (36.9 °C)]   Pulse:  [53-85]   Resp:  [16-23]   BP:  (106-153)/(71-96)   SpO2:  [94 %-100 %]   BP Location: Left arm    Physical Exam   Constitutional: He appears well-developed and well-nourished.   HENT:   Head: Normocephalic and atraumatic.   Eyes: Pupils are equal, round, and reactive to light.   Neck: Normal range of motion.   Cardiovascular: Normal rate and regular rhythm.   Pulmonary/Chest: Effort normal.   Abdominal: Soft.   Neurological: He is alert.       Neurological Exam:   LOC: alert  Attention Span: poor  Language: aphasia  - global, follows commands with mimics   Articulation: Dysarthria  Orientation: Not oriented to place, and time  EOM (CN III, IV, VI): Full/intact  Pupils (CN II, III): PERRL  Facial Sensation (CN V): Normal  Facial Movement (CN VII): Lower facial weakness on the right  Gag Reflex: present  Reflexes: flexor plantar responses bilaterally  Motor: Arm left  Normal 5/5  Leg left  Normal 5/5  Arm right  Paresis: 4/5  Leg right Paresis: 4/5  Tone: Normal tone throughout    Laboratory:  CMP:   Recent Labs   Lab 07/30/19 0450   CALCIUM 9.3   ALBUMIN 2.9*   PROT 6.7      K 3.5   CO2 23      BUN 8   CREATININE 0.8   ALKPHOS 74   ALT 9*   AST 14   BILITOT 0.6     CBC:   Recent Labs   Lab 07/30/19 0450   WBC 5.40   RBC 3.96*   HGB 11.2*   HCT 34.9*   *   MCV 88   MCH 28.3   MCHC 32.1     Lipid Panel:   Recent Labs   Lab 07/28/19 0458   CHOL 160   LDLCALC 110.6   HDL 33*   TRIG 82     Coagulation: No results for input(s): PT, INR, APTT in the last 168 hours.  Platelet Aggregation Study: No results for input(s): PLTAGG, PLTAGINTERP, PLTAGREGLACO, ADPPLTAGGREG in the last 168 hours.  Hgb A1C:   Recent Labs   Lab 07/28/19 0458   HGBA1C 4.8     TSH:   Recent Labs   Lab 07/28/19 0458   TSH 0.385*       Diagnostic Results     Brain Imaging   MRI Brain acute interventional protocol 7-28-19 results:      Acute infarct within the left postcentral gyrus of the parietal lobe.  No proximal arterial occlusion demonstrated on  MRA.    Remote infarcts within the bilateral precentral gyri and left middle frontal gyrus, consistent with remote bilateral MCA and left TJ infarcts.    Vessel Imaging:  CTA head and neck multiphase 7-28-19 results:  Bilateral frontal lobe encephalomalacia, likely secondary to remote left TJ and bilateral anterior MCA distribution infarcts.    Multifocal areas of irregularity and narrowing involving the intracranial arteries, specifically the left TJ and bilateral M2 branches which may be related to remote insults/atherosclerosis.  No evidence of acute proximal/large vessel occlusion.  However, further evaluation with MRI is suggested for correlation and to exclude acute infarct.    Generalized cerebral volume loss and chronic microvascular ischemic changes.     Cardiac Imaging:  · Normal left ventricular systolic function. The estimated ejection fraction is 55%  · Normal LV diastolic function.  · Normal right ventricular systolic function.  · Normal central venous pressure (3 mm Hg).      CT head 7/28/19  Acute left anterior temporal zone of infarction again noted without hemorrhagic conversion.  Multiple remote areas of infarction and encephalomalacia, as above.  Left ethmoid sinus disease with suspicion for fungal colonization.           BRANDI Jarrett  Comprehensive Stroke Center  Department of Vascular Neurology   [facility]

## 2019-07-30 NOTE — NURSING
At 1945, patient arrived to NSU in stable condition via stretcher per transportation. Expressive aphasia noted but patient able to say yes and no to questions. Respirations even and unlabored. No s/s of distress noted. Denies any pain at this time. Oriented patient to room and was able to correctly demonstrate the use of the call bell. Bed locked in lowest position. Call bell within reach. Will continue to monitor.

## 2019-07-30 NOTE — PLAN OF CARE
Problem: Adult Inpatient Plan of Care  Goal: Plan of Care Review  Outcome: Ongoing (interventions implemented as appropriate)  POC reviewed with patient. Patient requires reinforcement. No evidence of learning. Expressive aphasia noted. VSS throughout shift. Fall/safety precautions implemented and maintained. Blood glucose monitored. Bed locked in lowest position. Call bell within reach. Will continue to monitor.

## 2019-07-30 NOTE — PLAN OF CARE
Problem: SLP Goal  Goal: SLP Goal  Speech Language Pathology Goals  Goals expected to be met by 8/5:   1. Pt will participate in ongoing assessment of swallow.   2. Pt will answer simple y/n questions with 70% accy with min cues.   3. Pt will follow simple commands with 60% accy given max cues.   4. Pt will complete auto speech tasks with 50% accy given max cues.   5. Pt will id objects in field of 2 with 60% accy.             Outcome: Ongoing (interventions implemented as appropriate)  Rec dental soft diet with thin liquids with strict aspiration precautions. JOSE LUIS Graham, CCC/SLP  7/30/2019

## 2019-07-30 NOTE — PT/OT/SLP EVAL
"Occupational Therapy   Evaluation    Name: Ayden Kincaid  MRN: [id number]  Admitting Diagnosis:  Stroke due to embolism of left middle cerebral artery      Recommendations:     Discharge Recommendations: nursing facility, skilled(pt will need 24 hour (A))  Discharge Equipment Recommendations:  (unknown)  Barriers to discharge:  (questionable)    Assessment:     Ayden Kincaid is a 64 y.o. male with a medical diagnosis of Stroke due to embolism of left middle cerebral artery.  He presents with notable cognitive deficits, decreased safety, & deficits with ADL's due to deficits with RUE.  Pt will require 24 hour (A) upon discharge due to deficits. Performance deficits affecting function: weakness, impaired sensation, impaired self care skills, impaired functional mobilty, decreased coordination, impaired cognition, impaired balance, decreased upper extremity function, decreased lower extremity function, decreased safety awareness, decreased ROM, impaired coordination, impaired fine motor.      Rehab Prognosis: Fair; patient would benefit from acute skilled OT services to address these deficits and reach maximum level of function.       Plan:     Patient to be seen 3 x/week to address the above listed problems via self-care/home management, therapeutic activities, therapeutic exercises, cognitive retraining, neuromuscular re-education, sensory integration  · Plan of Care Expires: 08/29/19  · Plan of Care Reviewed with: patient    Subjective     Chief Complaint: DELMA  Patient/Family Comments/goals: DELMA    Occupational Profile:  Living Environment & PLOF: Per chart pt was intermittently homeless.  Pt with "yes" response to all questions regarding where pt lives.  Pt reported yes to all questions regarding mobility with ADL's PTA.  Per chart pt was using cane/walker intermittently.  Pt indicated that he does not drive.  Will need to verify pt's prior physical level of mobility & cognition due to noted expressive & receptive " aphasia.  Equipment Used at Home:  (pt with no intelligible response, per chart pt was using cane/walker intermittently)  Assistance upon Discharge: unknown - no family present.    Pain/Comfort:  · Pain Rating 1: 0/10  · Pain Rating Post-Intervention 1: 0/10    Patients cultural, spiritual, Evangelical conflicts given the current situation: (DELMA)    Objective:     Communicated with: RN prior to session.  Patient found supine with telemetry upon OT entry to room.  No family present.    General Precautions: Standard, fall, aspiration, NPO     Occupational Performance:    Bed Mobility:    · Patient completed Rolling/Turning to Left with  moderate assistance  · Patient completed Supine to Sit with moderate assistance  · Patient completed Sit to Supine with contact guard assistance    Functional Mobility/Transfers:  · Patient completed Sit <> Stand Transfer with minimum assistance  with  no assistive device   · Functional Mobility: CGA in room    Activities of Daily Living:  · Upper Body Dressing: independence and maximal assistance donning gown around back seated EOB  · Lower Body Dressing: stand by assistance donning socks seated EOB (required increased time & effort_    Cognitive/Visual Perceptual:  Cognitive/Psychosocial Skills:     -       Oriented to: Person   -       Follows Commands/attention:followed no commands during session  -       Communication: expressive aphasia and receptive aphasia  -       Memory: DELMA  -       Safety awareness/insight to disability: impaired   -       Mood/Affect/Coping skills/emotional control: Appropriate to situation    Physical Exam:  Sensation: pt with no coherent response  Dominant hand: pt with no intelligible response  Upper Extremity Range of Motion:  AAROM WFL (noted tightness at 90* shoulder flexion for RUE & PIP tightness in all fingers of right hand  Upper Extremity Strength: DELMA due to pt unable to perform to command   Strength: pt did not perform upon command    [facility]  6 Click ADL:  AMPAC Total Score: 12    Treatment & Education:  Provided education regarding role of OT, POC, & discharge recommendations with pt verbalizing understanding.  Pt had no further questions & when asked whether there were any concerns pt reported none.      Education:    Patient left supine with all lines intact, call button in reach, bed alarm on, RN notified and white board updated.    GOALS:   Multidisciplinary Problems     Occupational Therapy Goals        Problem: Occupational Therapy Goal    Goal Priority Disciplines Outcome Interventions   Occupational Therapy Goal     OT, PT/OT Ongoing (interventions implemented as appropriate)    Description:  Goals to be met by: 8/10     Patient will increase functional independence with ADLs by performing:    UE Dressing with Stand-by Assistance.  LE Dressing with Supervision.  Grooming while standing with Stand-by Assistance.  Toileting from toilet with Stand-by Assistance for hygiene and clothing management.   Supine to sit with Supervision.  Stand pivot transfers with Supervision.  Pt will follow 3/4 one step commands.                      History:     Past Medical History:   Diagnosis Date    HTN (hypertension)     Stroke     muliple as seen on CT scan       History reviewed. No pertinent surgical history.    Time Tracking:     OT Date of Treatment: 07/30/19  OT Start Time: 0828  OT Stop Time: 0841  OT Total Time (min): 13 min    Billable Minutes:Evaluation 13    ADRYAN Aguilar  7/30/2019

## 2019-07-30 NOTE — PLAN OF CARE
Problem: Occupational Therapy Goal  Goal: Occupational Therapy Goal  Goals to be met by: 8/10     Patient will increase functional independence with ADLs by performing:    UE Dressing with Stand-by Assistance.  LE Dressing with Supervision.  Grooming while standing with Stand-by Assistance.  Toileting from toilet with Stand-by Assistance for hygiene and clothing management.   Supine to sit with Supervision.  Stand pivot transfers with Supervision.  Pt will follow 3/4 one step commands.    Outcome: Ongoing (interventions implemented as appropriate)  OT eval completed.

## 2019-07-30 NOTE — PLAN OF CARE
Problem: Physical Therapy Goal  Goal: Physical Therapy Goal  Outcome: Ongoing (interventions implemented as appropriate)  Initial eval completed.  Results, POC, and therapy recommendations discussed with patient.   Complete evaluation documentation to follow.    Mobility Recommendations: walk with nursing assist  D/c recommendations: refer to SLP and OT recommendations; PT needs not the priority     Alka Cortez, JJ  7/30/2019  [phone] (pager)

## 2019-07-30 NOTE — SUBJECTIVE & OBJECTIVE
Neurologic Chief Complaint: left MCA - M2 occlusion    Subjective:     Interval History: Patient is seen for follow-up neurological assessment and treatment recommendations: discussed care with older daughter, cathi. Appears that patient is unable to safely return to prior home site due to drug abuse activity (cocaine- him and his family In the area). The daughter wishes to place him in a safer environment near her in [facility], Past Medical, Family, and Social History remains the same as documented in the initial encounter.     Review of Systems   Constitutional: Positive for activity change. Negative for fever.   HENT: Positive for trouble swallowing.    Neurological: Positive for facial asymmetry, speech difficulty and weakness.     Scheduled Meds:   aspirin  81 mg Oral Daily    atorvastatin  40 mg Oral Daily    folic acid  1 mg Oral Daily    heparin (porcine)  5,000 Units Subcutaneous Q8H    senna-docusate 8.6-50 mg  1 tablet Oral BID    thiamine  100 mg Oral Daily     Continuous Infusions:  PRN Meds:acetaminophen, hydrALAZINE, ondansetron, sodium chloride 0.9%    Objective:     Vital Signs (Most Recent):  Temp: 96.5 °F (35.8 °C) (07/30/19 1612)  Pulse: 67 (07/30/19 1615)  Resp: 17 (07/30/19 1612)  BP: 113/71 (07/30/19 1612)  SpO2: (!) 94 % (07/30/19 1612)  BP Location: Left arm    Vital Signs Range (Last 24H):  Temp:  [96.5 °F (35.8 °C)-98.5 °F (36.9 °C)]   Pulse:  [53-85]   Resp:  [16-23]   BP: (106-153)/(71-96)   SpO2:  [94 %-100 %]   BP Location: Left arm    Physical Exam   Constitutional: He appears well-developed and well-nourished.   HENT:   Head: Normocephalic and atraumatic.   Eyes: Pupils are equal, round, and reactive to light.   Neck: Normal range of motion.   Cardiovascular: Normal rate and regular rhythm.   Pulmonary/Chest: Effort normal.   Abdominal: Soft.   Neurological: He is alert.       Neurological Exam:   LOC: alert  Attention Span: poor  Language: aphasia - global,  follows commands with mimics   Articulation: Dysarthria  Orientation: Not oriented to place, and time  EOM (CN III, IV, VI): Full/intact  Pupils (CN II, III): PERRL  Facial Sensation (CN V): Normal  Facial Movement (CN VII): Lower facial weakness on the right  Gag Reflex: present  Reflexes: flexor plantar responses bilaterally  Motor: Arm left  Normal 5/5  Leg left  Normal 5/5  Arm right  Paresis: 4/5  Leg right Paresis: 4/5  Tone: Normal tone throughout    Laboratory:  CMP:   Recent Labs   Lab 07/30/19  0450   CALCIUM 9.3   ALBUMIN 2.9*   PROT 6.7      K 3.5   CO2 23      BUN 8   CREATININE 0.8   ALKPHOS 74   ALT 9*   AST 14   BILITOT 0.6     CBC:   Recent Labs   Lab 07/30/19 0450   WBC 5.40   RBC 3.96*   HGB 11.2*   HCT 34.9*   *   MCV 88   MCH 28.3   MCHC 32.1     Lipid Panel:   Recent Labs   Lab 07/28/19 0458   CHOL 160   LDLCALC 110.6   HDL 33*   TRIG 82     Coagulation: No results for input(s): PT, INR, APTT in the last 168 hours.  Platelet Aggregation Study: No results for input(s): PLTAGG, PLTAGINTERP, PLTAGREGLACO, ADPPLTAGGREG in the last 168 hours.  Hgb A1C:   Recent Labs   Lab 07/28/19 0458   HGBA1C 4.8     TSH:   Recent Labs   Lab 07/28/19 0458   TSH 0.385*       Diagnostic Results     Brain Imaging   MRI Brain acute interventional protocol 7-28-19 results:      Acute infarct within the left postcentral gyrus of the parietal lobe.  No proximal arterial occlusion demonstrated on MRA.    Remote infarcts within the bilateral precentral gyri and left middle frontal gyrus, consistent with remote bilateral MCA and left TJ infarcts.    Vessel Imaging:  CTA head and neck multiphase 7-28-19 results:  Bilateral frontal lobe encephalomalacia, likely secondary to remote left TJ and bilateral anterior MCA distribution infarcts.    Multifocal areas of irregularity and narrowing involving the intracranial arteries, specifically the left TJ and bilateral M2 branches which may be related to  remote insults/atherosclerosis.  No evidence of acute proximal/large vessel occlusion.  However, further evaluation with MRI is suggested for correlation and to exclude acute infarct.    Generalized cerebral volume loss and chronic microvascular ischemic changes.     Cardiac Imaging:  · Normal left ventricular systolic function. The estimated ejection fraction is 55%  · Normal LV diastolic function.  · Normal right ventricular systolic function.  · Normal central venous pressure (3 mm Hg).      CT head 7/28/19  Acute left anterior temporal zone of infarction again noted without hemorrhagic conversion.  Multiple remote areas of infarction and encephalomalacia, as above.  Left ethmoid sinus disease with suspicion for fungal colonization.

## 2019-07-30 NOTE — ASSESSMENT & PLAN NOTE
Area of cytotoxic cerebral edema identified when reviewing brain imaging in the territory of the L middle cerebral artery. There is not mass effect associated with it. We will continue to monitor the patients clinical exam for any worsening of symptoms which may indicate expansion of the stroke or the area of the edema resulting in the clinical change. The pattern is suggestive of ESUS etiology

## 2019-07-30 NOTE — HOSPITAL COURSE
7/29/19: SLP diagnosis of Aphasia, Dysphagia and Dysarthria. PT/OT evals pending.   07/30/2019: Bed mobility Mod (I)-SV PT and then CGA-ModA with OT  Sit to stand and transfers SV.  Ambulated 200 ft CGA-SBA.  UBD (I)-mAXa and LBD SBA.

## 2019-07-30 NOTE — PT/OT/SLP PROGRESS
"Speech Language Pathology Treatment    Patient Name:  Ayden Kincaid   MRN:  [id number]  Admitting Diagnosis: Stroke due to embolism of left middle cerebral artery    Recommendations:                 General Recommendations:  Dysphagia therapy, Speech/language therapy and Cognitive-linguistic therapy  Diet recommendations:  NPO, Liquid Diet Level: NPO   Aspiration Precautions: Meds crushed in puree, No straws, Small bites/sips and Strict aspiration precautions   General Precautions: Standard, aphasia, aspiration, fall, NPO  Communication strategies:  provide increased time to answer and go to room if call light pushed    Subjective     "I don't know."    Pain/Comfort:  · Pain Rating 1: 0/10  · Pain Rating Post-Intervention 1: 0/10    Objective:     Has the patient been evaluated by SLP for swallowing?   Yes  Keep patient NPO? Yes   Current Respiratory Status: room air      Pt seen bedside.  Slightly improved speech intelligibility today with increased vocal intensity.  Pt unable to elicit dry swallow or cough.  Po trials including 4 oz of nectar thick liquids, 7 oz of thin liquids, 4 oz of puree and 1 mohini cracker trials without overt s/s aspiration today.  Swallow response was fairly timely.  Hyolaryngeal rise remains variable.  Vocal quality remained clear.  Less swallows elicited per bolus.  Pt educated on swallow precs and advancement to dental soft diet 2/2 impulsive self feeding and lack of dentition though unable to verbalize understanding.  Y/n response in conversation was slightly more differentiated though not consistently.  Pt unable to follow structured commands despite max cues.  He was unable to repeat simple vowels or complete auto speech tasks.  No attempts at object naming.  Pt perseverating on "I don't know" for all tasks.  Occasional appropriate generic short phrase noted.  Education provided re: role of SLP, aphasia, communication strategies and POC.  Education to be ongoing.         Assessment: "     Ayden Kincaid is a 64 y.o. male with an SLP diagnosis of Aphasia, Dysphagia and Dysarthria.      Goals:   Multidisciplinary Problems     SLP Goals        Problem: SLP Goal    Goal Priority Disciplines Outcome   SLP Goal     SLP Ongoing (interventions implemented as appropriate)   Description:  Speech Language Pathology Goals  Goals expected to be met by 8/5:   1. Pt will participate in ongoing assessment of swallow.   2. Pt will answer simple y/n questions with 70% accy with min cues.   3. Pt will follow simple commands with 60% accy given max cues.   4. Pt will complete auto speech tasks with 50% accy given max cues.   5. Pt will id objects in field of 2 with 60% accy.                              Plan:     · Patient to be seen:  4 x/week   · Plan of Care expires:  08/28/19  · Plan of Care reviewed with:  patient   · SLP Follow-Up:  Yes       Discharge recommendations:  nursing facility, skilled   Barriers to Discharge:  Level of Skilled Assistance Needed      Time Tracking:     SLP Treatment Date:   07/30/19  Speech Start Time:  0722  Speech Stop Time:  0742     Speech Total Time (min):  20 min    Billable Minutes: Speech Therapy Individual 10 and Treatment Swallowing Dysfunction 10    JOSE LUIS Graham, CCC-SLP  07/30/2019

## 2019-07-30 NOTE — PROGRESS NOTES
Pt transferred to  [id number] by bed on tele monitor. Pt belongings and chart transferred with pt. Bedside handoff given to SHANNON Rucker.

## 2019-07-30 NOTE — CONSULTS
[facility]  Physical Medicine & Rehab  Consult Note    Patient Name: Ayden Kincaid  MRN: [id number]  Admission Date: 7/28/2019  Hospital Length of Stay: 2 days  Attending Physician: Guilherme Madera MD     Inpatient consult to Physical Medicine & Rehabilitation  Consult performed by: Carolina Argutea NP  Consult requested by:  Guilherme Madera MD    Reason for Consult:  assess rehabilitation needs  Consults  Subjective:     Principal Problem: Stroke due to embolism of left middle cerebral artery    HPI: Ayden Kincaid is a 64-year-old male with PMHx of HTN.  Patient presented to [facility] with R sided weakness and aphasia .  CTH revealed no acute pathology.  A telemedicine consult was placed. tPA was recommended and administered. Transferred to [facility] on 7/28 for further evaluation and management.  Upon admission, CTA revealed Bilateral frontal lobe encephalomalacia, likely secondary to remote left TJ and bilateral anterior MCA distribution infarcts.  MRI brain revealed acute infarct within the left postcentral gyrus of the parietal lobe.  Hospital course complicted by dysarthria, dysphagia (NPO), and aphasia. Stroke team following.     Functional History: Patient lives in [place].  PLF limited 2/2 aphasia.     Hospital Course: 7/29/19: SLP diagnosis of Aphasia, Dysphagia and Dysarthria. PT/OT evals pending.     Past Medical History:   Diagnosis Date    HTN (hypertension)     Stroke     muliple as seen on CT scan     History reviewed. No pertinent surgical history.  Review of patient's allergies indicates:  No Known Allergies    Scheduled Medications:    aspirin  81 mg Oral Daily    atorvastatin  40 mg Oral Daily    folic acid  1 mg Oral Daily    heparin (porcine)  5,000 Units Subcutaneous Q8H    senna-docusate 8.6-50 mg  1 tablet Oral BID    thiamine  100 mg Oral Daily       PRN Medications: acetaminophen, hydrALAZINE, ondansetron, sodium chloride 0.9%    Family History      "Unknown 2/2 aphasia.         Tobacco Use    Smoking status: Unknown If Ever Smoked   Substance and Sexual Activity    Alcohol use: Yes    Drug use: Yes     Types: Benzodiazepines, "Crack" cocaine    Sexual activity: Not on file     Review of Systems   Reason unable to perform ROS: aphasia/dysarthria.     Objective:     Vital Signs (Most Recent):  Temp: 98 °F (36.7 °C) (07/30/19 0800)  Pulse: 67 (07/30/19 0806)  Resp: 16 (07/30/19 0800)  BP: (!) 121/90 (07/30/19 0800)  SpO2: (!) 94 % (07/30/19 0800)    Vital Signs (24h Range):  Temp:  [97 °F (36.1 °C)-98.5 °F (36.9 °C)] 98 °F (36.7 °C)  Pulse:  [55-85] 67  Resp:  [12-26] 16  SpO2:  [94 %-100 %] 94 %  BP: (106-182)/(72-98) 121/90     Body mass index is 21 kg/m².    Physical Exam   Constitutional: He appears well-developed and well-nourished. No distress.   HENT:   Head: Normocephalic and atraumatic.   Eyes: Right eye exhibits no discharge. Left eye exhibits no discharge.   Neck: Neck supple.   Cardiovascular: Normal rate and intact distal pulses.   Pulmonary/Chest: Effort normal. No respiratory distress.   Abdominal: Soft. He exhibits no distension.   Musculoskeletal: He exhibits no edema or deformity.   R sided weakness    Neurological: He is alert.   + aphasia and dysarthria  Follows some simple commands   Skin: Skin is warm and dry.   Psychiatric: He has a normal mood and affect. His behavior is normal. His speech is slurred. Cognition and memory are impaired.     Diagnostic Results:   Labs: Reviewed  ECG: Reviewed  X-Ray: Reviewed  CT: Reviewed  MRI: Reviewed    Assessment/Plan:     * Stroke due to embolism of left middle cerebral artery  -MRI brain revealed acute infarct within the left postcentral gyrus of the parietal lobe.    See hospital course for functional, cognitive/speech/language, and nutrition/swallow status.      Recommendations  -  Encourage mobility, OOB in chair at least 3 hours per day, and early ambulation as appropriate   -  PT/OT evaluate " and treat  -  SLP speech and cognitive evaluate and treat  -  Monitor sleep disturbances and establish consistent sleep-wake cycle  -  Monitor for bowel and bladder dysfunction  -  Monitor for shoulder pain, subluxation, & spasticity  -  Monitor for and prevent skin breakdown and pressure ulcers  · Early mobility, repositioning/weight shifting every 20-30 minutes when sitting, turn patient every 2 hours, proper mattress/overlay and chair cushioning, pressure relief/heel protector boots  -  DVT prophylaxis (if appropriate)  -  Reviewed discharge options (IP rehab, SNF, HH therapy, and OP therapy)    Aphasia  -SLP eval and treat     Therapy evaluations pending. Will follow progress and discuss with rehab team for post acute care/rehab recommendation.      Thank you for your consult.     Carolina Argueta NP  Department of Physical Medicine & Rehab  [facility]

## 2019-07-30 NOTE — ASSESSMENT & PLAN NOTE
Close monitoring in [facility] 24 hours post administration     No complications

## 2019-07-31 LAB
ALBUMIN SERPL BCP-MCNC: 2.8 G/DL (ref 3.5–5.2)
ALP SERPL-CCNC: 77 U/L (ref 55–135)
ALT SERPL W/O P-5'-P-CCNC: 7 U/L (ref 10–44)
ANION GAP SERPL CALC-SCNC: 12 MMOL/L (ref 8–16)
AST SERPL-CCNC: 15 U/L (ref 10–40)
BASOPHILS # BLD AUTO: 0.03 K/UL (ref 0–0.2)
BASOPHILS NFR BLD: 0.6 % (ref 0–1.9)
BILIRUB SERPL-MCNC: 0.4 MG/DL (ref 0.1–1)
BUN SERPL-MCNC: 13 MG/DL (ref 8–23)
CALCIUM SERPL-MCNC: 8.8 MG/DL (ref 8.7–10.5)
CHLORIDE SERPL-SCNC: 103 MMOL/L (ref 95–110)
CO2 SERPL-SCNC: 23 MMOL/L (ref 23–29)
CREAT SERPL-MCNC: 0.8 MG/DL (ref 0.5–1.4)
DIFFERENTIAL METHOD: ABNORMAL
EOSINOPHIL # BLD AUTO: 0.3 K/UL (ref 0–0.5)
EOSINOPHIL NFR BLD: 5 % (ref 0–8)
ERYTHROCYTE [DISTWIDTH] IN BLOOD BY AUTOMATED COUNT: 13 % (ref 11.5–14.5)
EST. GFR  (AFRICAN AMERICAN): >60 ML/MIN/1.73 M^2
EST. GFR  (NON AFRICAN AMERICAN): >60 ML/MIN/1.73 M^2
GLUCOSE SERPL-MCNC: 76 MG/DL (ref 70–110)
HCT VFR BLD AUTO: 32.9 % (ref 40–54)
HGB BLD-MCNC: 10.8 G/DL (ref 14–18)
IMM GRANULOCYTES # BLD AUTO: 0.03 K/UL (ref 0–0.04)
IMM GRANULOCYTES NFR BLD AUTO: 0.6 % (ref 0–0.5)
LYMPHOCYTES # BLD AUTO: 1.8 K/UL (ref 1–4.8)
LYMPHOCYTES NFR BLD: 32.7 % (ref 18–48)
MAGNESIUM SERPL-MCNC: 1.9 MG/DL (ref 1.6–2.6)
MCH RBC QN AUTO: 28.8 PG (ref 27–31)
MCHC RBC AUTO-ENTMCNC: 32.8 G/DL (ref 32–36)
MCV RBC AUTO: 88 FL (ref 82–98)
MONOCYTES # BLD AUTO: 0.6 K/UL (ref 0.3–1)
MONOCYTES NFR BLD: 10.1 % (ref 4–15)
NEUTROPHILS # BLD AUTO: 2.8 K/UL (ref 1.8–7.7)
NEUTROPHILS NFR BLD: 51 % (ref 38–73)
NRBC BLD-RTO: 0 /100 WBC
PHOSPHATE SERPL-MCNC: 3.6 MG/DL (ref 2.7–4.5)
PLATELET # BLD AUTO: 395 K/UL (ref 150–350)
PMV BLD AUTO: 10 FL (ref 9.2–12.9)
POCT GLUCOSE: 102 MG/DL (ref 70–110)
POCT GLUCOSE: 112 MG/DL (ref 70–110)
POTASSIUM SERPL-SCNC: 3.5 MMOL/L (ref 3.5–5.1)
PROT SERPL-MCNC: 6.3 G/DL (ref 6–8.4)
RBC # BLD AUTO: 3.75 M/UL (ref 4.6–6.2)
SODIUM SERPL-SCNC: 138 MMOL/L (ref 136–145)
WBC # BLD AUTO: 5.45 K/UL (ref 3.9–12.7)

## 2019-07-31 PROCEDURE — 99232 SBSQ HOSP IP/OBS MODERATE 35: CPT | Mod: ,,, | Performed by: NURSE PRACTITIONER

## 2019-07-31 PROCEDURE — 20600001 HC STEP DOWN PRIVATE ROOM

## 2019-07-31 PROCEDURE — 36415 COLL VENOUS BLD VENIPUNCTURE: CPT

## 2019-07-31 PROCEDURE — 83735 ASSAY OF MAGNESIUM: CPT

## 2019-07-31 PROCEDURE — 92507 TX SP LANG VOICE COMM INDIV: CPT

## 2019-07-31 PROCEDURE — 84100 ASSAY OF PHOSPHORUS: CPT

## 2019-07-31 PROCEDURE — 85025 COMPLETE CBC W/AUTO DIFF WBC: CPT

## 2019-07-31 PROCEDURE — 92526 ORAL FUNCTION THERAPY: CPT

## 2019-07-31 PROCEDURE — 80053 COMPREHEN METABOLIC PANEL: CPT

## 2019-07-31 PROCEDURE — 25000003 PHARM REV CODE 250: Performed by: PHYSICIAN ASSISTANT

## 2019-07-31 PROCEDURE — 99233 SBSQ HOSP IP/OBS HIGH 50: CPT | Mod: ,,, | Performed by: PHYSICIAN ASSISTANT

## 2019-07-31 PROCEDURE — 99233 PR SUBSEQUENT HOSPITAL CARE,LEVL III: ICD-10-PCS | Mod: ,,, | Performed by: PHYSICIAN ASSISTANT

## 2019-07-31 PROCEDURE — 99232 PR SUBSEQUENT HOSPITAL CARE,LEVL II: ICD-10-PCS | Mod: ,,, | Performed by: NURSE PRACTITIONER

## 2019-07-31 PROCEDURE — 63600175 PHARM REV CODE 636 W HCPCS: Performed by: PHYSICIAN ASSISTANT

## 2019-07-31 RX ADMIN — ATORVASTATIN CALCIUM 40 MG: 20 TABLET, FILM COATED ORAL at 08:07

## 2019-07-31 RX ADMIN — ASPIRIN 81 MG CHEWABLE TABLET 81 MG: 81 TABLET CHEWABLE at 08:07

## 2019-07-31 RX ADMIN — HEPARIN SODIUM 5000 UNITS: 5000 INJECTION, SOLUTION INTRAVENOUS; SUBCUTANEOUS at 09:07

## 2019-07-31 RX ADMIN — HEPARIN SODIUM 5000 UNITS: 5000 INJECTION, SOLUTION INTRAVENOUS; SUBCUTANEOUS at 05:07

## 2019-07-31 RX ADMIN — HEPARIN SODIUM 5000 UNITS: 5000 INJECTION, SOLUTION INTRAVENOUS; SUBCUTANEOUS at 02:07

## 2019-07-31 RX ADMIN — Medication 100 MG: at 08:07

## 2019-07-31 RX ADMIN — SENNOSIDES,DOCUSATE SODIUM 1 TABLET: 8.6; 5 TABLET, FILM COATED ORAL at 08:07

## 2019-07-31 RX ADMIN — FOLIC ACID 1 MG: 1 TABLET ORAL at 08:07

## 2019-07-31 NOTE — PT/OT/SLP PROGRESS
"Speech Language Pathology Treatment    Patient Name:  Ayden Kincaid   MRN:  [id number]  Admitting Diagnosis: Stroke due to embolism of left middle cerebral artery    Recommendations:                 General Recommendations:  Dysphagia therapy, Speech/language therapy and Cognitive-linguistic therapy  Diet recommendations:  Dental Soft, Liquid Diet Level: Thin   Aspiration Precautions: 1 bite/sip at a time, HOB to 90 degrees, Meds crushed in puree, Monitor for s/s of aspiration, No straws, Small bites/sips and Strict aspiration precautions   General Precautions: Standard, aphasia, aspiration, fall  Communication strategies:  provide increased time to answer and go to room if call light pushed   Subjective     "I don't know" (pt perseverated on phrase.  Message was mostly unclear)    Pain/Comfort:  · Pain Rating 1: 0/10  · Pain Rating Post-Intervention 1: 0/10    Objective:     Has the patient been evaluated by SLP for swallowing?   Yes  Keep patient NPO? No   Current Respiratory Status: room air      Pt awake/alert at bedside. Mr. Kincaid did not follow or model basic commands presented.  Pt responded to basic y/n questions with 40% accuracy; however, note responses were difficult to differentiate at times.  He counted 2/10 numbers in unison with therapist.  He was unable to clearly complete other automatic speech tasks despite max cues.  He did not clearly name common objects or repeat words.  Pt was seated upright and presented with thin water (approximatley 5 oz)  and portions of cracker x4.  No overt s/s of aspiration were observed.  Education provided to pt regarding ST role, language stimulation, swallow precautions, s/s aspiration and poc.  Pt unable to verbalize understanding at this time secondary to aphasia. Whiteboard updated.     Assessment:     Ayden Kincaid is a 64 y.o. male with an SLP diagnosis of Aphasia and Dysphagia.      Goals:   Multidisciplinary Problems     SLP Goals        Problem: SLP Goal    Goal " Priority Disciplines Outcome   SLP Goal     SLP Ongoing (interventions implemented as appropriate)   Description:  Speech Language Pathology Goals  Goals expected to be met by 8/5:   1. Pt will participate in ongoing assessment of swallow.   2. Pt will answer simple y/n questions with 70% accy with min cues.   3. Pt will follow simple commands with 60% accy given max cues.   4. Pt will complete auto speech tasks with 50% accy given max cues.   5. Pt will id objects in field of 2 with 60% accy.                              Plan:     · Patient to be seen:  4 x/week   · Plan of Care expires:  08/28/19  · Plan of Care reviewed with:  patient   · SLP Follow-Up:  Yes       Discharge recommendations:  nursing facility, skilled     Time Tracking:     SLP Treatment Date:   07/31/19  Speech Start Time:  1200  Speech Stop Time:  1220     Speech Total Time (min):  20 min    Billable Minutes: Speech Therapy Individual 10 and Treatment Swallowing Dysfunction 10    JOSE LUIS Coates, CCC-SLP  Speech Language Pathologist  [phone]  7/31/2019

## 2019-07-31 NOTE — SUBJECTIVE & OBJECTIVE
Neurologic Chief Complaint: left MCA - M2 occlusion    Subjective:     Interval History: Patient is seen for follow-up neurological assessment and treatment recommendations: no events, neurologically stable, working on placement vs 24 hour care    HPI, Past Medical, Family, and Social History remains the same as documented in the initial encounter.     Review of Systems   Constitutional: Positive for activity change. Negative for fever.   HENT: Positive for trouble swallowing.    Neurological: Positive for facial asymmetry, speech difficulty and weakness.     Scheduled Meds:   aspirin  81 mg Oral Daily    atorvastatin  40 mg Oral Daily    folic acid  1 mg Oral Daily    heparin (porcine)  5,000 Units Subcutaneous Q8H    senna-docusate 8.6-50 mg  1 tablet Oral BID    thiamine  100 mg Oral Daily     Continuous Infusions:  PRN Meds:acetaminophen, hydrALAZINE, ondansetron, sodium chloride 0.9%    Objective:     Vital Signs (Most Recent):  Temp: 98 °F (36.7 °C) (07/31/19 1135)  Pulse: 84 (07/31/19 1135)  Resp: 14 (07/31/19 1135)  BP: 101/64 (07/31/19 1135)  SpO2: 97 % (07/31/19 1135)  BP Location: Right arm    Vital Signs Range (Last 24H):  Temp:  [96.5 °F (35.8 °C)-98.6 °F (37 °C)]   Pulse:  [58-97]   Resp:  [14-18]   BP: ()/(55-80)   SpO2:  [94 %-100 %]   BP Location: Right arm    Physical Exam   Constitutional: He appears well-developed and well-nourished.   HENT:   Head: Normocephalic and atraumatic.   Eyes: Pupils are equal, round, and reactive to light.   Neck: Normal range of motion.   Cardiovascular: Normal rate and regular rhythm.   Pulmonary/Chest: Effort normal.   Abdominal: Soft.   Neurological: He is alert.       Neurological Exam:   LOC: alert  Attention Span: poor  Language: aphasia - global, follows commands with mimics   Articulation: Dysarthria,more talkative today but difficult to understand   Orientation: Not oriented to place, and time  EOM (CN III, IV, VI): Full/intact  Pupils (CN II,  III): PERRL  Facial Sensation (CN V): Normal  Facial Movement (CN VII): Lower facial weakness on the right  Gag Reflex: present  Reflexes: flexor plantar responses bilaterally  Motor: Arm left  Normal 5/5  Leg left  Normal 5/5  Arm right  Paresis: 4/5  Leg right Paresis: 4/5  Tone: Normal tone throughout    Laboratory:  CMP:   Recent Labs   Lab 07/31/19  0325   CALCIUM 8.8   ALBUMIN 2.8*   PROT 6.3      K 3.5   CO2 23      BUN 13   CREATININE 0.8   ALKPHOS 77   ALT 7*   AST 15   BILITOT 0.4     CBC:   Recent Labs   Lab 07/31/19  0325   WBC 5.45   RBC 3.75*   HGB 10.8*   HCT 32.9*   *   MCV 88   MCH 28.8   MCHC 32.8     Lipid Panel:   Recent Labs   Lab 07/28/19  0458   CHOL 160   LDLCALC 110.6   HDL 33*   TRIG 82     Coagulation: No results for input(s): PT, INR, APTT in the last 168 hours.  Platelet Aggregation Study: No results for input(s): PLTAGG, PLTAGINTERP, PLTAGREGLACO, ADPPLTAGGREG in the last 168 hours.  Hgb A1C:   Recent Labs   Lab 07/28/19 0458   HGBA1C 4.8     TSH:   Recent Labs   Lab 07/28/19 0458   TSH 0.385*       Diagnostic Results     Brain Imaging   MRI Brain acute interventional protocol 7-28-19 results:      Acute infarct within the left postcentral gyrus of the parietal lobe.  No proximal arterial occlusion demonstrated on MRA.    Remote infarcts within the bilateral precentral gyri and left middle frontal gyrus, consistent with remote bilateral MCA and left TJ infarcts.    Vessel Imaging:  CTA head and neck multiphase 7-28-19 results:  Bilateral frontal lobe encephalomalacia, likely secondary to remote left TJ and bilateral anterior MCA distribution infarcts.    Multifocal areas of irregularity and narrowing involving the intracranial arteries, specifically the left TJ and bilateral M2 branches which may be related to remote insults/atherosclerosis.  No evidence of acute proximal/large vessel occlusion.  However, further evaluation with MRI is suggested for correlation  and to exclude acute infarct.    Generalized cerebral volume loss and chronic microvascular ischemic changes.     Cardiac Imaging:  · Normal left ventricular systolic function. The estimated ejection fraction is 55%  · Normal LV diastolic function.  · Normal right ventricular systolic function.  · Normal central venous pressure (3 mm Hg).      CT head 7/28/19  Acute left anterior temporal zone of infarction again noted without hemorrhagic conversion.  Multiple remote areas of infarction and encephalomalacia, as above.  Left ethmoid sinus disease with suspicion for fungal colonization.

## 2019-07-31 NOTE — SUBJECTIVE & OBJECTIVE
Interval History 7/31/2019:  Patient is seen for follow-up rehab evaluation and recommendations: Participating with therapy.    HPI, Past Medical, Family, and Social History remains the same as documented in the initial encounter.    Scheduled Medications:    aspirin  81 mg Oral Daily    atorvastatin  40 mg Oral Daily    folic acid  1 mg Oral Daily    heparin (porcine)  5,000 Units Subcutaneous Q8H    senna-docusate 8.6-50 mg  1 tablet Oral BID    thiamine  100 mg Oral Daily       Diagnostic Results: Labs: Reviewed    PRN Medications: acetaminophen, hydrALAZINE, ondansetron, sodium chloride 0.9%    Review of Systems   Reason unable to perform ROS: aphasia/dysarthria.     Objective:     Vital Signs (Most Recent):  Temp: 97.3 °F (36.3 °C) (07/31/19 0747)  Pulse: 61(Simultaneous filing. User may not have seen previous data.) (07/31/19 0747)  Resp: 14 (07/31/19 0747)  BP: 108/67 (07/31/19 0747)  SpO2: 96 % (07/31/19 0747)    Vital Signs (24h Range):  Temp:  [96.5 °F (35.8 °C)-98.6 °F (37 °C)] 97.3 °F (36.3 °C)  Pulse:  [53-97] 61  Resp:  [14-18] 14  SpO2:  [94 %-100 %] 96 %  BP: ()/(55-80) 108/67     Physical Exam   Constitutional: He appears well-developed and well-nourished. No distress.   HENT:   Head: Normocephalic and atraumatic.   Eyes: Right eye exhibits no discharge. Left eye exhibits no discharge.   Neck: Neck supple.   Cardiovascular: Normal rate and intact distal pulses.   Pulmonary/Chest: Effort normal. No respiratory distress.   Abdominal: Soft. He exhibits no distension.   Musculoskeletal: He exhibits no edema or deformity.   R sided weakness    Neurological: He is alert.   + aphasia and dysarthria  Follows some simple commands   Skin: Skin is warm and dry.   Psychiatric: He has a normal mood and affect. His behavior is normal. His speech is slurred. Cognition and memory are impaired.     NEUROLOGICAL EXAMINATION:     MENTAL STATUS   Speech: slurred

## 2019-07-31 NOTE — PLAN OF CARE
Problem: Adult Inpatient Plan of Care  Goal: Plan of Care Review  Outcome: Ongoing (interventions implemented as appropriate)  POC reviewed with patient. Patient requires reinforcement. No evidence of learning. VSS throughout shift. Fall/safety precautions implemented and maintained. Denies any pain this shift. Blood glucose monitored. Bed locked in lowest position. Call bell within reach. Will continue to monitor.

## 2019-07-31 NOTE — PLAN OF CARE
Problem: SLP Goal  Goal: SLP Goal  Speech Language Pathology Goals  Goals expected to be met by 8/5:   1. Pt will participate in ongoing assessment of swallow.   2. Pt will answer simple y/n questions with 70% accy with min cues.   3. Pt will follow simple commands with 60% accy given max cues.   4. Pt will complete auto speech tasks with 50% accy given max cues.   5. Pt will id objects in field of 2 with 60% accy.               Continue ST plan of care.     JOSE LUIS Coates, CCC-SLP  Speech Language Pathologist  [phone]  7/31/2019

## 2019-07-31 NOTE — PROGRESS NOTES
[facility]  Vascular Neurology  Comprehensive Stroke Center  Progress Note    Assessment/Plan:     * Stroke due to embolism of left middle cerebral artery  65 y/o male with L MCA syndrome received IV TPA    Antithrombotics: ASA 81 mg daily    Statins: Lipitor 40 mg daily    Aggressive risk factor modification: HTN     Rehab efforts: The patient has been evaluated by a stroke team provider and the therapy needs have been fully considered based off the presenting complaints and exam findings. The following therapy evaluations are needed: PT evaluate and treat, OT evaluate and treat, SLP evaluate and treat, PM&R evaluate for appropriate placement - SNF vs 24 hour care     Diagnostics ordered/pending: HgbA1C to assess blood glucose levels, Lipid Profile to assess cholesterol levels, TTE to assess cardiac function/status , TSH to assess thyroid function    VTE prophylaxis: SCD's and heparin 5000 units sc Q8H     BP parameters: Goal -160        Drug abuse  Per daughter, history of drug abuse   Cocaine - use with sister and ex wifes nephew   Daughter would like to remove him from this situation and have him closer to her in [place]     Cytotoxic cerebral edema  Area of cytotoxic cerebral edema identified when reviewing brain imaging in the territory of the L middle cerebral artery. There is not mass effect associated with it. We will continue to monitor the patients clinical exam for any worsening of symptoms which may indicate expansion of the stroke or the area of the edema resulting in the clinical change. The pattern is suggestive of ESUS etiology        Essential hypertension  Stroke risk factor  SBP <180 due to TPA    Aphasia  Due to stroke  Aggressive therapy    Received intravenous tissue plasminogen activator (t-PA) in emergency department  Close monitoring in [facility] 24 hours post administration     No complications          7/29/2019- Patient more alert, mild aphasia and dysarthria, TTE  normal LA, no wall motion abnormalities, continue ASA and statin, rehab placement  7/30/19 - discussed care with older daughter, cathi. Appears that patient is unable to safely return to prior home site due to drug abuse activity (cocaine- him and his family In the area). The daughter wishes to place him in a safer environment near her in [facility]  7/31/19 - no events, neurologically stable, working on placement vs 24 hour care    STROKE DOCUMENTATION   Acute Stroke Times   Last Known Normal Date: 07/27/19  Last Known Normal Time: 2200  Symptom Onset Date: 07/27/19  Symptom Onset Time: 2200  Stroke Team Called Date: 07/27/19  Stroke Team Called Time: 2249  Stroke Team Arrival Date: 07/28/19  Stroke Team Arrival Time: 0145  CT Interpretation Time: 2251  Decision to Treat Time for Alteplase: 2320(bolus given)    NIH Scale:  1a. Level of Consciousness: 0-->Alert, keenly responsive  1b. LOC Questions: 2-->Answers neither question correctly  1c. LOC Commands: 2-->Performs neither task correctly  2. Best Gaze: 1-->Partial gaze palsy, gaze is abnormal in one or both eyes, but forced deviation or total gaze paresis is not present  3. Visual: 0-->No visual loss  4. Facial Palsy: 2-->Partial paralysis (total or near-total paralysis of lower face)  5a. Motor Arm, Left: 0-->No drift, limb holds 90 (or 45) degrees for full 10 secs  5b. Motor Arm, Right: 1-->Drift, limb holds 90 (or 45) degrees, but drifts down before full 10 secs, does not hit bed or other support  6a. Motor Leg, Left: 0-->No drift, leg holds 30 degree position for full 5 secs  6b. Motor Leg, Right: 0-->No drift, leg holds 30 degree position for full 5 secs  7. Limb Ataxia: 0-->Absent  8. Sensory: 0-->Normal, no sensory loss  9. Best Language: 2-->Severe aphasia, all communication is through fragmentary expression, great need for inference, questioning, and guessing by the listener. Range of information that can be exchanged is limited, listener carries  burden of. . . (see row details)  10. Dysarthria: 2-->Severe dysarthria, patients speech is so slurred as to be unintelligible in the absence of or out of proportion to any dysphasia, or is mute/anarthric  11. Extinction and Inattention (formerly Neglect): 0-->No abnormality  Total (NIH Stroke Scale): 12       Modified Ceres Score: 0  Rosalba Coma Scale:    ABCD2 Score:    [id number] Score:   HAS -BLED Score:   ICH Score:   Hunt & Barksdale Classification:      Hemorrhagic change of an Ischemic Stroke: Does this patient have an ischemic stroke with hemorrhagic changes? No     Neurologic Chief Complaint: left MCA - M2 occlusion    Subjective:     Interval History: Patient is seen for follow-up neurological assessment and treatment recommendations: no events, neurologically stable, working on placement vs 24 hour care    HPI, Past Medical, Family, and Social History remains the same as documented in the initial encounter.     Review of Systems   Constitutional: Positive for activity change. Negative for fever.   HENT: Positive for trouble swallowing.    Neurological: Positive for facial asymmetry, speech difficulty and weakness.     Scheduled Meds:   aspirin  81 mg Oral Daily    atorvastatin  40 mg Oral Daily    folic acid  1 mg Oral Daily    heparin (porcine)  5,000 Units Subcutaneous Q8H    senna-docusate 8.6-50 mg  1 tablet Oral BID    thiamine  100 mg Oral Daily     Continuous Infusions:  PRN Meds:acetaminophen, hydrALAZINE, ondansetron, sodium chloride 0.9%    Objective:     Vital Signs (Most Recent):  Temp: 98 °F (36.7 °C) (07/31/19 1135)  Pulse: 84 (07/31/19 1135)  Resp: 14 (07/31/19 1135)  BP: 101/64 (07/31/19 1135)  SpO2: 97 % (07/31/19 1135)  BP Location: Right arm    Vital Signs Range (Last 24H):  Temp:  [96.5 °F (35.8 °C)-98.6 °F (37 °C)]   Pulse:  [58-97]   Resp:  [14-18]   BP: ()/(55-80)   SpO2:  [94 %-100 %]   BP Location: Right arm    Physical Exam   Constitutional: He appears well-developed  and well-nourished.   HENT:   Head: Normocephalic and atraumatic.   Eyes: Pupils are equal, round, and reactive to light.   Neck: Normal range of motion.   Cardiovascular: Normal rate and regular rhythm.   Pulmonary/Chest: Effort normal.   Abdominal: Soft.   Neurological: He is alert.       Neurological Exam:   LOC: alert  Attention Span: poor  Language: aphasia - global, follows commands with mimics   Articulation: Dysarthria,more talkative today but difficult to understand   Orientation: Not oriented to place, and time  EOM (CN III, IV, VI): Full/intact  Pupils (CN II, III): PERRL  Facial Sensation (CN V): Normal  Facial Movement (CN VII): Lower facial weakness on the right  Gag Reflex: present  Reflexes: flexor plantar responses bilaterally  Motor: Arm left  Normal 5/5  Leg left  Normal 5/5  Arm right  Paresis: 4/5  Leg right Paresis: 4/5  Tone: Normal tone throughout    Laboratory:  CMP:   Recent Labs   Lab 07/31/19  0325   CALCIUM 8.8   ALBUMIN 2.8*   PROT 6.3      K 3.5   CO2 23      BUN 13   CREATININE 0.8   ALKPHOS 77   ALT 7*   AST 15   BILITOT 0.4     CBC:   Recent Labs   Lab 07/31/19 0325   WBC 5.45   RBC 3.75*   HGB 10.8*   HCT 32.9*   *   MCV 88   MCH 28.8   MCHC 32.8     Lipid Panel:   Recent Labs   Lab 07/28/19  0458   CHOL 160   LDLCALC 110.6   HDL 33*   TRIG 82     Coagulation: No results for input(s): PT, INR, APTT in the last 168 hours.  Platelet Aggregation Study: No results for input(s): PLTAGG, PLTAGINTERP, PLTAGREGLACO, ADPPLTAGGREG in the last 168 hours.  Hgb A1C:   Recent Labs   Lab 07/28/19 0458   HGBA1C 4.8     TSH:   Recent Labs   Lab 07/28/19 0458   TSH 0.385*       Diagnostic Results     Brain Imaging   MRI Brain acute interventional protocol 7-28-19 results:      Acute infarct within the left postcentral gyrus of the parietal lobe.  No proximal arterial occlusion demonstrated on MRA.    Remote infarcts within the bilateral precentral gyri and left middle frontal  gyrus, consistent with remote bilateral MCA and left TJ infarcts.    Vessel Imaging:  CTA head and neck multiphase 7-28-19 results:  Bilateral frontal lobe encephalomalacia, likely secondary to remote left TJ and bilateral anterior MCA distribution infarcts.    Multifocal areas of irregularity and narrowing involving the intracranial arteries, specifically the left TJ and bilateral M2 branches which may be related to remote insults/atherosclerosis.  No evidence of acute proximal/large vessel occlusion.  However, further evaluation with MRI is suggested for correlation and to exclude acute infarct.    Generalized cerebral volume loss and chronic microvascular ischemic changes.     Cardiac Imaging:  · Normal left ventricular systolic function. The estimated ejection fraction is 55%  · Normal LV diastolic function.  · Normal right ventricular systolic function.  · Normal central venous pressure (3 mm Hg).      CT head 7/28/19  Acute left anterior temporal zone of infarction again noted without hemorrhagic conversion.  Multiple remote areas of infarction and encephalomalacia, as above.  Left ethmoid sinus disease with suspicion for fungal colonization.           BRANDI Jarrett  Comprehensive Stroke Center  Department of Vascular Neurology   [facility]

## 2019-07-31 NOTE — PROGRESS NOTES
[facility]  Physical Medicine & Rehab  Progress Note    Patient Name: Ayden Kincaid  MRN: [id number]  Admission Date: 7/28/2019  Length of Stay: 3 days  Attending Physician: Guilherme Madera MD    Subjective:     Principal Problem:Stroke due to embolism of left middle cerebral artery    Hospital Course:   7/29/19: SLP diagnosis of Aphasia, Dysphagia and Dysarthria. PT/OT evals pending.   07/30/2019: Bed mobility Mod (I)-SV PT and then CGA-ModA with OT  Sit to stand and transfers SV.  Ambulated 200 ft CGA-SBA.  UBD (I)-mAXa and LBD SBA.    Interval History 7/31/2019:  Patient is seen for follow-up rehab evaluation and recommendations: Participating with therapy.    HPI, Past Medical, Family, and Social History remains the same as documented in the initial encounter.    Scheduled Medications:    aspirin  81 mg Oral Daily    atorvastatin  40 mg Oral Daily    folic acid  1 mg Oral Daily    heparin (porcine)  5,000 Units Subcutaneous Q8H    senna-docusate 8.6-50 mg  1 tablet Oral BID    thiamine  100 mg Oral Daily       Diagnostic Results: Labs: Reviewed    PRN Medications: acetaminophen, hydrALAZINE, ondansetron, sodium chloride 0.9%    Review of Systems   Reason unable to perform ROS: aphasia/dysarthria.     Objective:     Vital Signs (Most Recent):  Temp: 97.3 °F (36.3 °C) (07/31/19 0747)  Pulse: 61(Simultaneous filing. User may not have seen previous data.) (07/31/19 0747)  Resp: 14 (07/31/19 0747)  BP: 108/67 (07/31/19 0747)  SpO2: 96 % (07/31/19 0747)    Vital Signs (24h Range):  Temp:  [96.5 °F (35.8 °C)-98.6 °F (37 °C)] 97.3 °F (36.3 °C)  Pulse:  [53-97] 61  Resp:  [14-18] 14  SpO2:  [94 %-100 %] 96 %  BP: ()/(55-80) 108/67     Physical Exam   Constitutional: He appears well-developed and well-nourished. No distress.   HENT:   Head: Normocephalic and atraumatic.   Eyes: Right eye exhibits no discharge. Left eye exhibits no discharge.   Neck: Neck supple.   Cardiovascular: Normal  rate and intact distal pulses.   Pulmonary/Chest: Effort normal. No respiratory distress.   Abdominal: Soft. He exhibits no distension.   Musculoskeletal: He exhibits no edema or deformity.   R sided weakness    Neurological: He is alert.   + aphasia and dysarthria  Follows some simple commands   Skin: Skin is warm and dry.   Psychiatric: He has a normal mood and affect. His behavior is normal. His speech is slurred. Cognition and memory are impaired.     Assessment/Plan:      * Stroke due to embolism of left middle cerebral artery  -MRI brain revealed acute infarct within the left postcentral gyrus of the parietal lobe.    See hospital course for functional, cognitive/speech/language, and nutrition/swallow status.      Recommendations  -  Encourage mobility, OOB in chair at least 3 hours per day, and early ambulation as appropriate   -  PT/OT evaluate and treat  -  SLP speech and cognitive evaluate and treat  -  Monitor sleep disturbances and establish consistent sleep-wake cycle  -  Monitor for bowel and bladder dysfunction  -  Monitor for shoulder pain, subluxation, & spasticity  -  Monitor for and prevent skin breakdown and pressure ulcers  · Early mobility, repositioning/weight shifting every 20-30 minutes when sitting, turn patient every 2 hours, proper mattress/overlay and chair cushioning, pressure relief/heel protector boots  -  DVT prophylaxis (if appropriate)  -  Reviewed discharge options (IP rehab, SNF, HH therapy, and OP therapy)      Aphasia  -SLP eval and treat     Recommend Skilled Nursing.      Carolina Argueta NP  Department of Physical Medicine & Rehab   [facility]

## 2019-07-31 NOTE — PLAN OF CARE
07/31/19 1619   Post-Acute Status   Post-Acute Authorization Placement   Post-Acute Placement Status Referrals Sent       Made referrals for [facility] and [facility].   Awaiting acceptance.  SW in contact with CM and Medical staff. Will continue to follow and offer support as needed.     Robert Arredondo, MAIA  [facility]   Ext. [phone]

## 2019-07-31 NOTE — ASSESSMENT & PLAN NOTE
63 y/o male with L MCA syndrome received IV TPA    Antithrombotics: ASA 81 mg daily    Statins: Lipitor 40 mg daily    Aggressive risk factor modification: HTN     Rehab efforts: The patient has been evaluated by a stroke team provider and the therapy needs have been fully considered based off the presenting complaints and exam findings. The following therapy evaluations are needed: PT evaluate and treat, OT evaluate and treat, SLP evaluate and treat, PM&R evaluate for appropriate placement - SNF vs 24 hour care     Diagnostics ordered/pending: HgbA1C to assess blood glucose levels, Lipid Profile to assess cholesterol levels, TTE to assess cardiac function/status , TSH to assess thyroid function    VTE prophylaxis: SCD's and heparin 5000 units sc Q8H     BP parameters: Goal -160

## 2019-08-01 LAB
ALBUMIN SERPL BCP-MCNC: 2.7 G/DL (ref 3.5–5.2)
ALP SERPL-CCNC: 81 U/L (ref 55–135)
ALT SERPL W/O P-5'-P-CCNC: 8 U/L (ref 10–44)
ANION GAP SERPL CALC-SCNC: 7 MMOL/L (ref 8–16)
AST SERPL-CCNC: 16 U/L (ref 10–40)
BASOPHILS # BLD AUTO: 0.02 K/UL (ref 0–0.2)
BASOPHILS NFR BLD: 0.3 % (ref 0–1.9)
BILIRUB SERPL-MCNC: 0.4 MG/DL (ref 0.1–1)
BUN SERPL-MCNC: 11 MG/DL (ref 8–23)
CALCIUM SERPL-MCNC: 9 MG/DL (ref 8.7–10.5)
CHLORIDE SERPL-SCNC: 104 MMOL/L (ref 95–110)
CO2 SERPL-SCNC: 25 MMOL/L (ref 23–29)
CREAT SERPL-MCNC: 0.8 MG/DL (ref 0.5–1.4)
DIFFERENTIAL METHOD: ABNORMAL
EOSINOPHIL # BLD AUTO: 0.3 K/UL (ref 0–0.5)
EOSINOPHIL NFR BLD: 4.9 % (ref 0–8)
ERYTHROCYTE [DISTWIDTH] IN BLOOD BY AUTOMATED COUNT: 12.9 % (ref 11.5–14.5)
EST. GFR  (AFRICAN AMERICAN): >60 ML/MIN/1.73 M^2
EST. GFR  (NON AFRICAN AMERICAN): >60 ML/MIN/1.73 M^2
GLUCOSE SERPL-MCNC: 83 MG/DL (ref 70–110)
HCT VFR BLD AUTO: 32.6 % (ref 40–54)
HGB BLD-MCNC: 10.2 G/DL (ref 14–18)
IMM GRANULOCYTES # BLD AUTO: 0.02 K/UL (ref 0–0.04)
IMM GRANULOCYTES NFR BLD AUTO: 0.3 % (ref 0–0.5)
LYMPHOCYTES # BLD AUTO: 1.9 K/UL (ref 1–4.8)
LYMPHOCYTES NFR BLD: 33.9 % (ref 18–48)
MAGNESIUM SERPL-MCNC: 2.1 MG/DL (ref 1.6–2.6)
MCH RBC QN AUTO: 28.5 PG (ref 27–31)
MCHC RBC AUTO-ENTMCNC: 31.3 G/DL (ref 32–36)
MCV RBC AUTO: 91 FL (ref 82–98)
MONOCYTES # BLD AUTO: 0.7 K/UL (ref 0.3–1)
MONOCYTES NFR BLD: 11.5 % (ref 4–15)
NEUTROPHILS # BLD AUTO: 2.8 K/UL (ref 1.8–7.7)
NEUTROPHILS NFR BLD: 49.1 % (ref 38–73)
NRBC BLD-RTO: 0 /100 WBC
PHOSPHATE SERPL-MCNC: 3.4 MG/DL (ref 2.7–4.5)
PLATELET # BLD AUTO: 372 K/UL (ref 150–350)
PMV BLD AUTO: 9.9 FL (ref 9.2–12.9)
POCT GLUCOSE: 187 MG/DL (ref 70–110)
POTASSIUM SERPL-SCNC: 3.6 MMOL/L (ref 3.5–5.1)
PROT SERPL-MCNC: 6.2 G/DL (ref 6–8.4)
RBC # BLD AUTO: 3.58 M/UL (ref 4.6–6.2)
SODIUM SERPL-SCNC: 136 MMOL/L (ref 136–145)
WBC # BLD AUTO: 5.72 K/UL (ref 3.9–12.7)

## 2019-08-01 PROCEDURE — 83735 ASSAY OF MAGNESIUM: CPT

## 2019-08-01 PROCEDURE — 20600001 HC STEP DOWN PRIVATE ROOM

## 2019-08-01 PROCEDURE — 63600175 PHARM REV CODE 636 W HCPCS: Performed by: PHYSICIAN ASSISTANT

## 2019-08-01 PROCEDURE — 85025 COMPLETE CBC W/AUTO DIFF WBC: CPT

## 2019-08-01 PROCEDURE — 36415 COLL VENOUS BLD VENIPUNCTURE: CPT

## 2019-08-01 PROCEDURE — 92507 TX SP LANG VOICE COMM INDIV: CPT

## 2019-08-01 PROCEDURE — 94761 N-INVAS EAR/PLS OXIMETRY MLT: CPT

## 2019-08-01 PROCEDURE — 97530 THERAPEUTIC ACTIVITIES: CPT

## 2019-08-01 PROCEDURE — 84100 ASSAY OF PHOSPHORUS: CPT

## 2019-08-01 PROCEDURE — 25000003 PHARM REV CODE 250: Performed by: PHYSICIAN ASSISTANT

## 2019-08-01 PROCEDURE — 99233 PR SUBSEQUENT HOSPITAL CARE,LEVL III: ICD-10-PCS | Mod: ,,, | Performed by: PSYCHIATRY & NEUROLOGY

## 2019-08-01 PROCEDURE — 99233 SBSQ HOSP IP/OBS HIGH 50: CPT | Mod: ,,, | Performed by: PSYCHIATRY & NEUROLOGY

## 2019-08-01 PROCEDURE — 80053 COMPREHEN METABOLIC PANEL: CPT

## 2019-08-01 RX ADMIN — FOLIC ACID 1 MG: 1 TABLET ORAL at 09:08

## 2019-08-01 RX ADMIN — SENNOSIDES,DOCUSATE SODIUM 1 TABLET: 8.6; 5 TABLET, FILM COATED ORAL at 09:08

## 2019-08-01 RX ADMIN — ASPIRIN 81 MG CHEWABLE TABLET 81 MG: 81 TABLET CHEWABLE at 09:08

## 2019-08-01 RX ADMIN — ATORVASTATIN CALCIUM 40 MG: 20 TABLET, FILM COATED ORAL at 09:08

## 2019-08-01 RX ADMIN — Medication 100 MG: at 09:08

## 2019-08-01 RX ADMIN — HEPARIN SODIUM 5000 UNITS: 5000 INJECTION, SOLUTION INTRAVENOUS; SUBCUTANEOUS at 05:08

## 2019-08-01 RX ADMIN — HEPARIN SODIUM 5000 UNITS: 5000 INJECTION, SOLUTION INTRAVENOUS; SUBCUTANEOUS at 09:08

## 2019-08-01 RX ADMIN — HEPARIN SODIUM 5000 UNITS: 5000 INJECTION, SOLUTION INTRAVENOUS; SUBCUTANEOUS at 02:08

## 2019-08-01 NOTE — PROGRESS NOTES
[facility]  Vascular Neurology  Comprehensive Stroke Center  Progress Note    Assessment/Plan:     * Stroke due to embolism of left middle cerebral artery  65 y/o male with PMHx of alcohol and cocaine abuse L MCA syndrome received IV TPA at O[facility] and transferred to [facility] for further care. CTA with no LVO. MRI with R MCA cortical infarct. TTE unremarkable, no LAE. Etiology currently ESUS vs drug abuse. Will need 30 day event monitor at discharge    Antithrombotics: ASA 81 mg daily    Statins: Lipitor 40 mg daily    Aggressive risk factor modification: HTN     Rehab efforts: The patient has been evaluated by a stroke team provider and the therapy needs have been fully considered based off the presenting complaints and exam findings. The following therapy evaluations are needed: PT evaluate and treat, OT evaluate and treat, SLP evaluate and treat, PM&R evaluate for appropriate placement     Diagnostics ordered/pending: None     VTE prophylaxis: SCD's and heparin 5000 units sc Q8H     BP parameters: Goal -160        Drug abuse  Per daughter, history of drug abuse   Cocaine - use with sister and ex wifes nephew   Daughter would like to remove him from this situation and have him closer to her in [place]   Patient without signs or symptoms of withdrawal  No behavioral problems this admission    Cytotoxic cerebral edema  Area of cytotoxic cerebral edema identified when reviewing brain imaging in the territory of the L middle cerebral artery. There is not mass effect associated with it. We will continue to monitor the patients clinical exam for any worsening of symptoms which may indicate expansion of the stroke or the area of the edema resulting in the clinical change. The pattern is suggestive of ESUS vs drug abuse etiology        Alcohol abuse  No signs of symptoms of withdrawl  Continue folic acid and thiamine  Will need counseling on cessation if aphasia improves    Essential hypertension  Stroke  risk factor  SBP <160  BP at goal without antihypertensives    Aphasia  Due to stroke  Aggressive therapy    Received intravenous tissue plasminogen activator (t-PA) in emergency department  Completed close monitoring in [facility] 24 hours post administration   No complications          7/29/2019- Patient more alert, mild aphasia and dysarthria, TTE normal LA, no wall motion abnormalities, continue ASA and statin, rehab placement  7/30/19 - discussed care with older daughter, cathi. Appears that patient is unable to safely return to prior home site due to drug abuse activity (cocaine- him and his family In the area). The daughter wishes to place him in a safer environment near her in [facility]  7/31/19 - no events, neurologically stable, working on placement vs 24 hour care  8/1 referrals sent to facilities, waiting acceptance    STROKE DOCUMENTATION   Acute Stroke Times   Last Known Normal Date: 07/27/19  Last Known Normal Time: 2200  Symptom Onset Date: 07/27/19  Symptom Onset Time: 2200  Stroke Team Called Date: 07/27/19  Stroke Team Called Time: 2249  Stroke Team Arrival Date: 07/28/19  Stroke Team Arrival Time: 0145  CT Interpretation Time: 2251  Decision to Treat Time for Alteplase: 2320(bolus given)    NIH Scale:  1a. Level of Consciousness: 0-->Alert, keenly responsive  1b. LOC Questions: 2-->Answers neither question correctly  1c. LOC Commands: 2-->Performs neither task correctly  2. Best Gaze: 0-->Normal  3. Visual: 0-->No visual loss  4. Facial Palsy: 2-->Partial paralysis (total or near-total paralysis of lower face)  5a. Motor Arm, Left: 0-->No drift, limb holds 90 (or 45) degrees for full 10 secs  5b. Motor Arm, Right: 1-->Drift, limb holds 90 (or 45) degrees, but drifts down before full 10 secs, does not hit bed or other support  6a. Motor Leg, Left: 0-->No drift, leg holds 30 degree position for full 5 secs  6b. Motor Leg, Right: 1-->Drift, leg falls by the end of the 5-sec period but does not hit  bed  7. Limb Ataxia: 0-->Absent  8. Sensory: 0-->Normal, no sensory loss  9. Best Language: 2-->Severe aphasia, all communication is through fragmentary expression, great need for inference, questioning, and guessing by the listener. Range of information that can be exchanged is limited, listener carries burden of. . . (see row details)  10. Dysarthria: 2-->Severe dysarthria, patients speech is so slurred as to be unintelligible in the absence of or out of proportion to any dysphasia, or is mute/anarthric  11. Extinction and Inattention (formerly Neglect): 0-->No abnormality  Total (NIH Stroke Scale): 12       Modified Cortland Score: 0  Rosalba Coma Scale:    ABCD2 Score:    [id number] Score:   HAS -BLED Score:   ICH Score:   Hunt & Barksdale Classification:      Hemorrhagic change of an Ischemic Stroke: Does this patient have an ischemic stroke with hemorrhagic changes? No     Neurologic Chief Complaint: left MCA - M2 occlusion    Subjective:     Interval History: Patient is seen for follow-up neurological assessment and treatment recommendations: CJ, remains aphasic and unable to provide interval history    HPI, Past Medical, Family, and Social History remains the same as documented in the initial encounter.     Review of Systems   Constitutional: Negative for fever.   Respiratory: Negative for cough.    Cardiovascular: Negative for leg swelling.   Gastrointestinal: Negative for vomiting.   Neurological: Positive for facial asymmetry, speech difficulty and weakness.   Psychiatric/Behavioral: Negative for agitation.     Scheduled Meds:   aspirin  81 mg Oral Daily    atorvastatin  40 mg Oral Daily    folic acid  1 mg Oral Daily    heparin (porcine)  5,000 Units Subcutaneous Q8H    senna-docusate 8.6-50 mg  1 tablet Oral BID    thiamine  100 mg Oral Daily     Continuous Infusions:  PRN Meds:acetaminophen, hydrALAZINE, ondansetron, sodium chloride 0.9%    Objective:     Vital Signs (Most Recent):  Temp: 97.6 °F  (36.4 °C) (08/01/19 0711)  Pulse: (!) 52 (08/01/19 0813)  Resp: 12 (08/01/19 0711)  BP: 130/71 (08/01/19 0711)  SpO2: 97 % (08/01/19 0711)  BP Location: Right arm    Vital Signs Range (Last 24H):  Temp:  [97.2 °F (36.2 °C)-98 °F (36.7 °C)]   Pulse:  [50-84]   Resp:  [12-18]   BP: (101-153)/(64-90)   SpO2:  [95 %-98 %]   BP Location: Right arm    Physical Exam   Constitutional: He appears well-developed and well-nourished. No distress.   HENT:   Head: Normocephalic and atraumatic.   Eyes: Pupils are equal, round, and reactive to light.   Cardiovascular: Bradycardia present.   Pulmonary/Chest: Effort normal. No respiratory distress.   Neurological: He is alert.   Skin: Skin is warm and dry.   Vitals reviewed.      Neurological Exam:   LOC: alert  Attention Span: poor  Language: global aphasia  Articulation: Dysarthria  Orientation: Not oriented to place, and time  EOM (CN III, IV, VI): Full/intact  Pupils (CN II, III): PERRL  Facial Sensation (CN V): Normal  Facial Movement (CN VII): Lower facial weakness on the right  Motor: Arm left  Normal 5/5  Leg left  Normal 5/5  Arm right  Paresis: 4/5  Leg right Paresis: 4/5  Tone: Normal tone throughout    Laboratory:  CMP:   Recent Labs   Lab 08/01/19  0508   CALCIUM 9.0   ALBUMIN 2.7*   PROT 6.2      K 3.6   CO2 25      BUN 11   CREATININE 0.8   ALKPHOS 81   ALT 8*   AST 16   BILITOT 0.4     CBC:   Recent Labs   Lab 08/01/19  0508   WBC 5.72   RBC 3.58*   HGB 10.2*   HCT 32.6*   *   MCV 91   MCH 28.5   MCHC 31.3*     Lipid Panel:   Recent Labs   Lab 07/28/19  0458   CHOL 160   LDLCALC 110.6   HDL 33*   TRIG 82     Coagulation: No results for input(s): PT, INR, APTT in the last 168 hours.  Platelet Aggregation Study: No results for input(s): PLTAGG, PLTAGINTERP, PLTAGREGLACO, ADPPLTAGGREG in the last 168 hours.  Hgb A1C:   Recent Labs   Lab 07/28/19 0458   HGBA1C 4.8     TSH:   Recent Labs   Lab 07/28/19 0458   TSH 0.385*       Diagnostic Results      Brain Imaging   MRI Brain acute interventional protocol 7-28-19 results:      Acute infarct within the left postcentral gyrus of the parietal lobe.  No proximal arterial occlusion demonstrated on MRA.    Remote infarcts within the bilateral precentral gyri and left middle frontal gyrus, consistent with remote bilateral MCA and left TJ infarcts.    Vessel Imaging:  CTA head and neck multiphase 7-28-19 results:  Bilateral frontal lobe encephalomalacia, likely secondary to remote left TJ and bilateral anterior MCA distribution infarcts.    Multifocal areas of irregularity and narrowing involving the intracranial arteries, specifically the left TJ and bilateral M2 branches which may be related to remote insults/atherosclerosis.  No evidence of acute proximal/large vessel occlusion.  However, further evaluation with MRI is suggested for correlation and to exclude acute infarct.    Generalized cerebral volume loss and chronic microvascular ischemic changes.     Cardiac Imaging:  · Normal left ventricular systolic function. The estimated ejection fraction is 55%  · Normal LV diastolic function.  · Normal right ventricular systolic function.  · Normal central venous pressure (3 mm Hg).      CT head 7/28/19  Acute left anterior temporal zone of infarction again noted without hemorrhagic conversion.  Multiple remote areas of infarction and encephalomalacia, as above.  Left ethmoid sinus disease with suspicion for fungal colonization.                 Yessy Friedman PA-C  Comprehensive Stroke Center  Department of Vascular Neurology   [facility]

## 2019-08-01 NOTE — SUBJECTIVE & OBJECTIVE
Neurologic Chief Complaint: left MCA - M2 occlusion    Subjective:     Interval History: Patient is seen for follow-up neurological assessment and treatment recommendations: CJ, remains aphasic and unable to provide interval history    HPI, Past Medical, Family, and Social History remains the same as documented in the initial encounter.     Review of Systems   Constitutional: Negative for fever.   Respiratory: Negative for cough.    Cardiovascular: Negative for leg swelling.   Gastrointestinal: Negative for vomiting.   Neurological: Positive for facial asymmetry, speech difficulty and weakness.   Psychiatric/Behavioral: Negative for agitation.     Scheduled Meds:   aspirin  81 mg Oral Daily    atorvastatin  40 mg Oral Daily    folic acid  1 mg Oral Daily    heparin (porcine)  5,000 Units Subcutaneous Q8H    senna-docusate 8.6-50 mg  1 tablet Oral BID    thiamine  100 mg Oral Daily     Continuous Infusions:  PRN Meds:acetaminophen, hydrALAZINE, ondansetron, sodium chloride 0.9%    Objective:     Vital Signs (Most Recent):  Temp: 97.6 °F (36.4 °C) (08/01/19 0711)  Pulse: (!) 52 (08/01/19 0813)  Resp: 12 (08/01/19 0711)  BP: 130/71 (08/01/19 0711)  SpO2: 97 % (08/01/19 0711)  BP Location: Right arm    Vital Signs Range (Last 24H):  Temp:  [97.2 °F (36.2 °C)-98 °F (36.7 °C)]   Pulse:  [50-84]   Resp:  [12-18]   BP: (101-153)/(64-90)   SpO2:  [95 %-98 %]   BP Location: Right arm    Physical Exam   Constitutional: He appears well-developed and well-nourished. No distress.   HENT:   Head: Normocephalic and atraumatic.   Eyes: Pupils are equal, round, and reactive to light.   Cardiovascular: Bradycardia present.   Pulmonary/Chest: Effort normal. No respiratory distress.   Neurological: He is alert.   Skin: Skin is warm and dry.   Vitals reviewed.      Neurological Exam:   LOC: alert  Attention Span: poor  Language: global aphasia  Articulation: Dysarthria  Orientation: Not oriented to place, and time  EOM (CN III,  IV, VI): Full/intact  Pupils (CN II, III): PERRL  Facial Sensation (CN V): Normal  Facial Movement (CN VII): Lower facial weakness on the right  Motor: Arm left  Normal 5/5  Leg left  Normal 5/5  Arm right  Paresis: 4/5  Leg right Paresis: 4/5  Tone: Normal tone throughout    Laboratory:  CMP:   Recent Labs   Lab 08/01/19  0508   CALCIUM 9.0   ALBUMIN 2.7*   PROT 6.2      K 3.6   CO2 25      BUN 11   CREATININE 0.8   ALKPHOS 81   ALT 8*   AST 16   BILITOT 0.4     CBC:   Recent Labs   Lab 08/01/19  0508   WBC 5.72   RBC 3.58*   HGB 10.2*   HCT 32.6*   *   MCV 91   MCH 28.5   MCHC 31.3*     Lipid Panel:   Recent Labs   Lab 07/28/19  0458   CHOL 160   LDLCALC 110.6   HDL 33*   TRIG 82     Coagulation: No results for input(s): PT, INR, APTT in the last 168 hours.  Platelet Aggregation Study: No results for input(s): PLTAGG, PLTAGINTERP, PLTAGREGLACO, ADPPLTAGGREG in the last 168 hours.  Hgb A1C:   Recent Labs   Lab 07/28/19  0458   HGBA1C 4.8     TSH:   Recent Labs   Lab 07/28/19  0458   TSH 0.385*       Diagnostic Results     Brain Imaging   MRI Brain acute interventional protocol 7-28-19 results:      Acute infarct within the left postcentral gyrus of the parietal lobe.  No proximal arterial occlusion demonstrated on MRA.    Remote infarcts within the bilateral precentral gyri and left middle frontal gyrus, consistent with remote bilateral MCA and left TJ infarcts.    Vessel Imaging:  CTA head and neck multiphase 7-28-19 results:  Bilateral frontal lobe encephalomalacia, likely secondary to remote left TJ and bilateral anterior MCA distribution infarcts.    Multifocal areas of irregularity and narrowing involving the intracranial arteries, specifically the left TJ and bilateral M2 branches which may be related to remote insults/atherosclerosis.  No evidence of acute proximal/large vessel occlusion.  However, further evaluation with MRI is suggested for correlation and to exclude acute  infarct.    Generalized cerebral volume loss and chronic microvascular ischemic changes.     Cardiac Imaging:  · Normal left ventricular systolic function. The estimated ejection fraction is 55%  · Normal LV diastolic function.  · Normal right ventricular systolic function.  · Normal central venous pressure (3 mm Hg).      CT head 7/28/19  Acute left anterior temporal zone of infarction again noted without hemorrhagic conversion.  Multiple remote areas of infarction and encephalomalacia, as above.  Left ethmoid sinus disease with suspicion for fungal colonization.

## 2019-08-01 NOTE — ASSESSMENT & PLAN NOTE
63 y/o male with PMHx of alcohol and cocaine abuse L MCA syndrome received IV TPA at OSH and transferred to [facility] for further care. CTA with no LVO. MRI with R MCA cortical infarct. TTE unremarkable, no LAE. Etiology currently ESUS vs drug abuse. Will need 30 day event monitor at discharge    Antithrombotics: ASA 81 mg daily    Statins: Lipitor 40 mg daily    Aggressive risk factor modification: HTN     Rehab efforts: The patient has been evaluated by a stroke team provider and the therapy needs have been fully considered based off the presenting complaints and exam findings. The following therapy evaluations are needed: PT evaluate and treat, OT evaluate and treat, SLP evaluate and treat, PM&R evaluate for appropriate placement     Diagnostics ordered/pending: None     VTE prophylaxis: SCD's and heparin 5000 units sc Q8H     BP parameters: Goal -160

## 2019-08-01 NOTE — PLAN OF CARE
08/01/19 1201   Post-Acute Status   Post-Acute Authorization Placement   Post-Acute Placement Status Referrals Sent     SW called NH..  1. [facility]- left   2. [facility]- left   3. [facility]    Will continue to work on placement for this patient in [place]. SW in contact with CM and Medical staff. Will continue to follow and offer support as needed.     Robert Arredondo LMSW  [facility]   Ext. [phone]

## 2019-08-01 NOTE — ASSESSMENT & PLAN NOTE
Completed close monitoring in [facility] 24 hours post administration   No complications

## 2019-08-01 NOTE — PT/OT/SLP PROGRESS
"Occupational Therapy   Treatment    Name: Ayden Kincaid  MRN: [id number]  Admitting Diagnosis:  Stroke due to embolism of left middle cerebral artery       Recommendations:     Discharge Recommendations: nursing facility, skilled  Discharge Equipment Recommendations:  (TBD)  Barriers to discharge:  (pt is homeless)    Assessment:     Ayden Kincaid is a 64 y.o. male with a medical diagnosis of Stroke due to embolism of left middle cerebral artery.  He presents with performance deficits including weakness, impaired functional mobilty, gait instability, impaired self care skills, impaired balance, decreased upper extremity function, decreased safety awareness, decreased coordination. Pt would continue to benefit from OT to maximize functional independence and safety.    Rehab Prognosis:  Fair; patient would benefit from acute skilled OT services to address these deficits and reach maximum level of function.       Plan:     Patient to be seen 3 x/week to address the above listed problems via self-care/home management, therapeutic activities, therapeutic exercises, neuromuscular re-education, cognitive retraining  · Plan of Care Expires: 08/29/19  · Plan of Care Reviewed with: patient    Subjective     Pain/Comfort:  · Pain Rating 1: 0/10  · Pain Rating Post-Intervention 1: 0/10    Objective:     Communicated with: RN prior to session. Patient found supine with telemetry, bed alarm upon OT entry to room.  Pt stated "I want to go home" otherwise speech was generally unintelligible.    General Precautions: Standard, aphasia, aspiration, fall   Orthopedic Precautions:N/A   Braces: N/A     Occupational Performance:     Bed Mobility:    · Patient completed Supine to Sit with stand by assistance with side rail  · Patient completed Sit to Supine with stand by assistance     Functional Mobility/Transfers:  · Patient completed Sit <> Stand Transfer with contact guard assistance with no assistive device   · Functional Mobility: Within " room household distance to bathroom with CGA-Min A no AD; R LE weakness evident    Activities of Daily Living:  · Pt declined      AMPAC 6 Click ADL: 18    Treatment & Education:  Pt was unable to follow all commands to participate in mobility and ADL tasks, speech mostly unintelligible; able to complete functional mobility household distance, unable to obtain PLOF; per RN who spoke to pt's sister this date, pt has speech deficits and R knee issues at baseline    Patient left supine with all lines intact, call button in reach, bed alarm on and RN notifiedEducation:      GOALS:   Multidisciplinary Problems     Occupational Therapy Goals        Problem: Occupational Therapy Goal    Goal Priority Disciplines Outcome Interventions   Occupational Therapy Goal     OT, PT/OT Ongoing (interventions implemented as appropriate)    Description:  Goals to be met by: 8/10     Patient will increase functional independence with ADLs by performing:    UE Dressing with Stand-by Assistance.  LE Dressing with Supervision.  Grooming while standing with Stand-by Assistance.  Toileting from toilet with Stand-by Assistance for hygiene and clothing management.   Supine to sit with Supervision.  Stand pivot transfers with Supervision.  Pt will follow 3/4 one step commands.                      Time Tracking:     OT Date of Treatment: 08/01/19  OT Start Time: 1402  OT Stop Time: 1412  OT Total Time (min): 10 min    Billable Minutes:Therapeutic Activity 10 minutes    ADRYAN Krishnan  8/1/2019

## 2019-08-01 NOTE — ASSESSMENT & PLAN NOTE
Per daughter, history of drug abuse   Cocaine - use with sister and ex wifes nephew   Daughter would like to remove him from this situation and have him closer to her in [place]   Patient without signs or symptoms of withdrawal  No behavioral problems this admission

## 2019-08-01 NOTE — PLAN OF CARE
Problem: Adult Inpatient Plan of Care  Goal: Plan of Care Review  Outcome: Ongoing (interventions implemented as appropriate)  POC reviewed with pt at bedside.  Pt aphasic.  Slept well overnight.  No changes in neuro noted.  No falls.  Progressing toward goals. VSS.  Bed in lowest position and locked.  Bed alarm activated and call light within easy reach.

## 2019-08-01 NOTE — PT/OT/SLP PROGRESS
"Speech Language Pathology Treatment    Patient Name:  Ayden Kincaid   MRN:  [id number]  Admitting Diagnosis: Stroke due to embolism of left middle cerebral artery    Recommendations:                 General Recommendations:  Dysphagia therapy, Speech/language therapy and Cognitive-linguistic therapy  Diet recommendations:  Dental Soft, Liquid Diet Level: Thin   Aspiration Precautions: Strict aspiration precautions   General Precautions: Standard, aphasia, aspiration, fall  Communication strategies:  provide increased time to answer and go to room if call light pushed    Subjective     "I don't know."    Pain/Comfort:  · Pain Rating 1: 0/10  · Pain Rating Post-Intervention 1: 0/10    Objective:     Has the patient been evaluated by SLP for swallowing?   Yes  Keep patient NPO? No   Current Respiratory Status: room air      Pt seen bedside, alert and cooperative.  Pt with continued appropriate generalized responses to conversation.  Communication sheet introduced.  No increase in y/n reliability with written y/n.  Pt unable to identify pictures in field of 3-4 despite max cues following rehearsal and demonstration.  Word approximations noted x2 with auto speech task of counting 1-10 with visual stim.  Continued perseveration on "I don't know" t/o tasks.  Education rpovided on continued SLP POC and communication strategies though to unable to verbalize understanding.              Assessment:     Ayden Kincaid is a 64 y.o. male with an SLP diagnosis of Aphasia, Dysphagia and Dysarthria.      Goals:   Multidisciplinary Problems     SLP Goals        Problem: SLP Goal    Goal Priority Disciplines Outcome   SLP Goal     SLP Ongoing (interventions implemented as appropriate)   Description:  Speech Language Pathology Goals  Goals expected to be met by 8/5:   1. Pt will participate in ongoing assessment of swallow.   2. Pt will answer simple y/n questions with 70% accy with min cues.   3. Pt will follow simple commands with 60% " accy given max cues.   4. Pt will complete auto speech tasks with 50% accy given max cues.   5. Pt will id objects in field of 2 with 60% accy.                              Plan:     · Patient to be seen:  4 x/week   · Plan of Care expires:  08/28/19  · Plan of Care reviewed with:  patient   · SLP Follow-Up:  Yes       Discharge recommendations:  nursing facility, skilled   Barriers to Discharge:  Level of Skilled Assistance Needed      Time Tracking:     SLP Treatment Date:   08/01/19  Speech Start Time:  0832  Speech Stop Time:  0841     Speech Total Time (min):  9 min    Billable Minutes: Speech Therapy Individual 9    JOSE LUIS Graham, CCC-SLP  08/01/2019

## 2019-08-01 NOTE — ASSESSMENT & PLAN NOTE
No signs of symptoms of withdrawl  Continue folic acid and thiamine  Will need counseling on cessation if aphasia improves

## 2019-08-01 NOTE — PLAN OF CARE
Problem: Occupational Therapy Goal  Goal: Occupational Therapy Goal  Goals to be met by: 8/10     Patient will increase functional independence with ADLs by performing:    UE Dressing with Stand-by Assistance.  LE Dressing with Supervision.  Grooming while standing with Stand-by Assistance.  Toileting from toilet with Stand-by Assistance for hygiene and clothing management.   Supine to sit with Supervision.  Stand pivot transfers with Supervision.  Pt will follow 3/4 one step commands.     Outcome: Ongoing (interventions implemented as appropriate)  Continue OT plan of care.

## 2019-08-01 NOTE — PLAN OF CARE
Problem: Adult Inpatient Plan of Care  Goal: Plan of Care Review  Nutrition Problem:  Moderate Protein-Calorie Malnutrition      Related to (etiology):  Unknown etiology, possible decreased po intake     Signs and Symptoms (as evidenced by):  Energy Intake: DELMA  Body Fat Depletion: mild and moderate depletion of orbitals, triceps and thoracic and lumbar region   Muscle Mass Depletion: mild and moderate depletion of clavicle region, scapular region and lower extremities , severe depletion in temples  Weight Loss: positive for weight loss, amt unknown per family     Interventions/Recommendations (treatment strategy):  Collaboration of care with providers  Commercial Beverage: Boost Plus TID.     Nutrition Diagnosis Status:  continues    Recommendations      1. Continue dysphagia soft diet. 2. Add Boost Plus TID.  Goals: 1. Pt's intake >75% meals, supplements x 7 days.  Nutrition Goal Status: new  Communication of KHADAR Recs: reviewed with physician

## 2019-08-01 NOTE — PLAN OF CARE
Problem: SLP Goal  Goal: SLP Goal  Speech Language Pathology Goals  Goals expected to be met by 8/5:   1. Pt will participate in ongoing assessment of swallow.   2. Pt will answer simple y/n questions with 70% accy with min cues.   3. Pt will follow simple commands with 60% accy given max cues.   4. Pt will complete auto speech tasks with 50% accy given max cues.   5. Pt will id objects in field of 2 with 60% accy.             Outcome: Ongoing (interventions implemented as appropriate)  Goals remain appropriate, cont POC. JOSE LUIS Graham, CCC/SLP  8/1/2019

## 2019-08-01 NOTE — ASSESSMENT & PLAN NOTE
Area of cytotoxic cerebral edema identified when reviewing brain imaging in the territory of the L middle cerebral artery. There is not mass effect associated with it. We will continue to monitor the patients clinical exam for any worsening of symptoms which may indicate expansion of the stroke or the area of the edema resulting in the clinical change. The pattern is suggestive of ESUS vs drug abuse etiology

## 2019-08-01 NOTE — PROGRESS NOTES
"[facility]  Adult Nutrition  Progress Note    SUMMARY       Recommendations     1. Continue dysphagia soft diet. 2. Add Boost Plus TID.  Goals: 1. Pt's intake >75% meals, supplements x 7 days.  Nutrition Goal Status: new  Communication of RD Recs: reviewed with physician    Reason for Assessment    Reason For Assessment: RD follow-up  Diagnosis: stroke/CVA  Relevant Medical History: CVA, COPD, HTN  Interdisciplinary Rounds: attended  General Information Comments: Pt with good intake dysphagia soft diet over the past 24 hrs, says his appetite is improved. Pt noted with moderate malnutrition per NFPE 7/29; has 4 lb (2.6%) wt loss since admission.  Nutrition Discharge Planning: Pt to tolerate modified diet and have adequate po intake for weight gain.    Nutrition Risk Screen    Nutrition Risk Screen: no indicators present    Nutrition/Diet History    Spiritual, Cultural Beliefs, Nondenominational Practices, Values that Affect Care: no  Factors Affecting Nutritional Intake: NPO, impaired cognitive status/motor control    Anthropometrics    Temp: 97.5 °F (36.4 °C)  Height Method: Estimated  Height: 5' 10" (177.8 cm)  Height (inches): 70 in  Weight Method: Bed Scale  Weight: 66.7 kg (147 lb 0.8 oz)  Weight (lb): 147.05 lb  Ideal Body Weight (IBW), Male: 166 lb  % Ideal Body Weight, Male (lb): 89.76 lb  BMI (Calculated): 21.4  BMI Grade: 18.5-24.9 - normal  Weight Loss: unintentional(DELMA amt per family and pt)       Lab/Procedures/Meds    Pertinent Labs Reviewed: reviewed  Pertinent Labs Comments: ----------  Pertinent Medications Reviewed: reviewed  Pertinent Medications Comments: statin, folic acid, thiamine      Estimated/Assessed Needs    Weight Used For Calorie Calculations: 67.6 kg (149 lb 0.5 oz)  Energy Calorie Requirements (kcal): 2204-8294  Energy Need Method: Kcal/kg(30-35kcal/kg)  Protein Requirements: 81-95g(1.2-1.4g/kg)  Weight Used For Protein Calculations: 67.6 kg (149 lb 0.5 oz)  Fluid " Requirements (mL): 1mL/kcal or per MD     RDA Method (mL): 2028         Nutrition Prescription Ordered    Current Diet Order: dysphagia soft, thin liiquids    Evaluation of Received Nutrient/Fluid Intake    IV Fluid (mL): 0  I/O: -3.7L since admission  Comments: LBM 7/28  % Intake of Estimated Energy Needs: 75 - 100 %  % Meal Intake: 75 - 100 %    Nutrition Risk    Level of Risk/Frequency of Follow-up: low     Assessment and Plan    Nutrition Problem:  Moderate Protein-Calorie Malnutrition      Related to (etiology):  Unknown etiology, possible decreased po intake     Signs and Symptoms (as evidenced by):  Energy Intake: DELMA  Body Fat Depletion: mild and moderate depletion of orbitals, triceps and thoracic and lumbar region   Muscle Mass Depletion: mild and moderate depletion of clavicle region, scapular region and lower extremities , severe depletion in temples  Weight Loss: positive for weight loss, amt unknown per family     Interventions/Recommendations (treatment strategy):  Collaboration of care with providers  Commercial Beverage: Boost Plus TID.     Nutrition Diagnosis Status:  continues    Monitor and Evaluation    Food and Nutrient Intake: energy intake, food and beverage intake  Food and Nutrient Adminstration: diet order  Knowledge/Beliefs/Attitudes: food and nutrition knowledge/skill  Anthropometric Measurements: weight, weight change, body mass index  Biochemical Data, Medical Tests and Procedures: gastrointestinal profile, electrolyte and renal panel, glucose/endocrine profile, inflammatory profile, lipid profile  Nutrition-Focused Physical Findings: overall appearance     Malnutrition Assessment  Malnutrition Type: other (see comments)(DELMA)          Weight Loss (Malnutrition): (weight loss per family, DELMA amt)   Orbital Region (Subcutaneous Fat Loss): severe depletion  Upper Arm Region (Subcutaneous Fat Loss): mild depletion  Thoracic and Lumbar Region: moderate depletion   Rastafarian Region (Muscle  Loss): moderate depletion  Clavicle Bone Region (Muscle Loss): moderate depletion  Clavicle and Acromion Bone Region (Muscle Loss): moderate depletion  Scapular Bone Region (Muscle Loss): moderate depletion  Dorsal Hand (Muscle Loss): well nourished  Patellar Region (Muscle Loss): mild depletion  Anterior Thigh Region (Muscle Loss): mild depletion  Posterior Calf Region (Muscle Loss): moderate depletion                 Nutrition Follow-Up    RD Follow-up?: Yes

## 2019-08-02 LAB
ALBUMIN SERPL BCP-MCNC: 2.7 G/DL (ref 3.5–5.2)
ALP SERPL-CCNC: 77 U/L (ref 55–135)
ALT SERPL W/O P-5'-P-CCNC: 8 U/L (ref 10–44)
ANION GAP SERPL CALC-SCNC: 11 MMOL/L (ref 8–16)
AST SERPL-CCNC: 16 U/L (ref 10–40)
BASOPHILS # BLD AUTO: 0.03 K/UL (ref 0–0.2)
BASOPHILS NFR BLD: 0.6 % (ref 0–1.9)
BILIRUB SERPL-MCNC: 0.4 MG/DL (ref 0.1–1)
BUN SERPL-MCNC: 10 MG/DL (ref 8–23)
CALCIUM SERPL-MCNC: 8.6 MG/DL (ref 8.7–10.5)
CHLORIDE SERPL-SCNC: 105 MMOL/L (ref 95–110)
CO2 SERPL-SCNC: 21 MMOL/L (ref 23–29)
CREAT SERPL-MCNC: 0.8 MG/DL (ref 0.5–1.4)
DIFFERENTIAL METHOD: ABNORMAL
EOSINOPHIL # BLD AUTO: 0.3 K/UL (ref 0–0.5)
EOSINOPHIL NFR BLD: 5.1 % (ref 0–8)
ERYTHROCYTE [DISTWIDTH] IN BLOOD BY AUTOMATED COUNT: 13.2 % (ref 11.5–14.5)
EST. GFR  (AFRICAN AMERICAN): >60 ML/MIN/1.73 M^2
EST. GFR  (NON AFRICAN AMERICAN): >60 ML/MIN/1.73 M^2
GLUCOSE SERPL-MCNC: 80 MG/DL (ref 70–110)
HCT VFR BLD AUTO: 32.9 % (ref 40–54)
HGB BLD-MCNC: 10.1 G/DL (ref 14–18)
IMM GRANULOCYTES # BLD AUTO: 0.02 K/UL (ref 0–0.04)
IMM GRANULOCYTES NFR BLD AUTO: 0.4 % (ref 0–0.5)
LYMPHOCYTES # BLD AUTO: 1.7 K/UL (ref 1–4.8)
LYMPHOCYTES NFR BLD: 35.4 % (ref 18–48)
MAGNESIUM SERPL-MCNC: 1.9 MG/DL (ref 1.6–2.6)
MCH RBC QN AUTO: 28.2 PG (ref 27–31)
MCHC RBC AUTO-ENTMCNC: 30.7 G/DL (ref 32–36)
MCV RBC AUTO: 92 FL (ref 82–98)
MONOCYTES # BLD AUTO: 0.5 K/UL (ref 0.3–1)
MONOCYTES NFR BLD: 9.6 % (ref 4–15)
NEUTROPHILS # BLD AUTO: 2.4 K/UL (ref 1.8–7.7)
NEUTROPHILS NFR BLD: 48.9 % (ref 38–73)
NRBC BLD-RTO: 0 /100 WBC
PHOSPHATE SERPL-MCNC: 3.1 MG/DL (ref 2.7–4.5)
PLATELET # BLD AUTO: 354 K/UL (ref 150–350)
PMV BLD AUTO: 10.4 FL (ref 9.2–12.9)
POTASSIUM SERPL-SCNC: 3.7 MMOL/L (ref 3.5–5.1)
PROT SERPL-MCNC: 6.3 G/DL (ref 6–8.4)
RBC # BLD AUTO: 3.58 M/UL (ref 4.6–6.2)
SODIUM SERPL-SCNC: 137 MMOL/L (ref 136–145)
WBC # BLD AUTO: 4.92 K/UL (ref 3.9–12.7)

## 2019-08-02 PROCEDURE — 83735 ASSAY OF MAGNESIUM: CPT

## 2019-08-02 PROCEDURE — 94761 N-INVAS EAR/PLS OXIMETRY MLT: CPT

## 2019-08-02 PROCEDURE — 99233 SBSQ HOSP IP/OBS HIGH 50: CPT | Mod: ,,, | Performed by: PSYCHIATRY & NEUROLOGY

## 2019-08-02 PROCEDURE — 85025 COMPLETE CBC W/AUTO DIFF WBC: CPT

## 2019-08-02 PROCEDURE — 63600175 PHARM REV CODE 636 W HCPCS: Performed by: PHYSICIAN ASSISTANT

## 2019-08-02 PROCEDURE — 99233 PR SUBSEQUENT HOSPITAL CARE,LEVL III: ICD-10-PCS | Mod: ,,, | Performed by: PSYCHIATRY & NEUROLOGY

## 2019-08-02 PROCEDURE — 80053 COMPREHEN METABOLIC PANEL: CPT

## 2019-08-02 PROCEDURE — 36415 COLL VENOUS BLD VENIPUNCTURE: CPT

## 2019-08-02 PROCEDURE — 84100 ASSAY OF PHOSPHORUS: CPT

## 2019-08-02 PROCEDURE — 20600001 HC STEP DOWN PRIVATE ROOM

## 2019-08-02 PROCEDURE — 97116 GAIT TRAINING THERAPY: CPT

## 2019-08-02 PROCEDURE — 25000003 PHARM REV CODE 250: Performed by: PHYSICIAN ASSISTANT

## 2019-08-02 RX ADMIN — SENNOSIDES,DOCUSATE SODIUM 1 TABLET: 8.6; 5 TABLET, FILM COATED ORAL at 09:08

## 2019-08-02 RX ADMIN — Medication 100 MG: at 09:08

## 2019-08-02 RX ADMIN — ATORVASTATIN CALCIUM 40 MG: 20 TABLET, FILM COATED ORAL at 09:08

## 2019-08-02 RX ADMIN — HEPARIN SODIUM 5000 UNITS: 5000 INJECTION, SOLUTION INTRAVENOUS; SUBCUTANEOUS at 01:08

## 2019-08-02 RX ADMIN — FOLIC ACID 1 MG: 1 TABLET ORAL at 09:08

## 2019-08-02 RX ADMIN — ASPIRIN 81 MG CHEWABLE TABLET 81 MG: 81 TABLET CHEWABLE at 09:08

## 2019-08-02 RX ADMIN — HEPARIN SODIUM 5000 UNITS: 5000 INJECTION, SOLUTION INTRAVENOUS; SUBCUTANEOUS at 06:08

## 2019-08-02 RX ADMIN — HEPARIN SODIUM 5000 UNITS: 5000 INJECTION, SOLUTION INTRAVENOUS; SUBCUTANEOUS at 09:08

## 2019-08-02 NOTE — PLAN OF CARE
Problem: Adult Inpatient Plan of Care  Goal: Plan of Care Review  Outcome: Ongoing (interventions implemented as appropriate)     08/02/19 0351   Plan of Care Review   Plan of Care Reviewed With patient   Progress improving     PT is AAOX1, no acute changes noted during shift, pt is up with asst and repositioned q2, no skin breakdown noted,  VSS. No falls noted. Fall precautions remain Pain assessed. No pain noted. Pt resting comfortably in bed. Call light in reach. Will continue to monitor.

## 2019-08-02 NOTE — PLAN OF CARE
Problem: Physical Therapy Goal  Goal: Physical Therapy Goal    Goals to be met by 8/10/2019    1. Pt will perform rolling to the R and L with independence.   2. Pt will perform supine to sit from both sides of the bed with independence.  3. Pt will perform sit to supine with independence.  4. Pt will perform sit to stand transfers with independence.    5. Pt will perform bed <> chair transfers with independence.  6. Pt will perform gait x 400 feet with independence while performing dynamic gait activities     Outcome: Ongoing (interventions implemented as appropriate)  goals remain appropriate

## 2019-08-02 NOTE — PT/OT/SLP PROGRESS
Physical Therapy Treatment    Patient Name:  Ayden Kincaid   MRN:  [id number]    Recommendations:     Discharge Recommendations:  nursing facility, skilled   Discharge Equipment Recommendations: (TBD)   Barriers to discharge: decreased caregiver assistance (needs 24 hr assistance)    Assessment:     Ayden Kincaid is a 64 y.o. male admitted with a medical diagnosis of Stroke due to embolism of left middle cerebral artery.  He presents with the following impairments/functional limitations:  weakness, impaired functional mobilty, gait instability, impaired self care skills, impaired balance, decreased upper extremity function, decreased safety awareness, decreased coordination, impaired cognition, pain Patient tolerated treatment well focusing on bed mobility transfers and gait. Patient able to walk up to 130' on this day with Min A for balance and object negotiation. Patient does not demonstrate awareness of lateral instability and other impairments while walking. Patient not oriented to self or place even with cues given. The patient's impaired balance and aphasia requires him to need 24 hour supervision/assistance upon discharge. The patient will continue to improve with skilled physical therapy services in order to return to functional baseline.      Rehab Prognosis: Good; patient would benefit from acute skilled PT services to address these deficits and reach maximum level of function.    Recent Surgery: * No surgery found *      Plan:     During this hospitalization, patient to be seen 2 x/week to address the identified rehab impairments via gait training, therapeutic activities, therapeutic exercises, neuromuscular re-education and progress toward the following goals:    · Plan of Care Expires:  08/28/19    Subjective     Chief Complaint: patient had difficulty finding words but indicated his low activity tolerance for today, patient aggreable to treatment with encouragment  Patient/Family Comments/goals: pt  indicated he wanted to go back to bed during gait trials, patient educated on importance of increased time out of bed, patient indicated understanding  Pain/Comfort:  · Pain Rating 1: other (see comments)(no rate given)  · Location - Side 1: Right  · Location - Orientation 1: generalized  · Location 1: arm  · Pain Addressed 1: Distraction, Reposition  · Pain Rating Post-Intervention 1: other (see comments)(no rate given)      Objective:     Communicated with nursing prior to session.  Patient found HOB elevated with telemetry upon PT entry to room.     General Precautions: Standard, aspiration, aphasia, fall   Orthopedic Precautions:N/A   Braces: N/A     Functional Mobility:  · Bed Mobility:     · Rolling Right: modified independence  · Scooting: modified independence  · Supine to Sit: modified independence  · Transfers:     · Sit to Stand:  contact guard assistance with no AD  · Gait: 130' x 2 trials with standing rest break, no assistive device Min A for balance and object negotiation, antalgic gait pattern with lateral instability. Patient does not demonstrate awareness of lateral instability and other impairments while walking especially around objects and on turns.  · Balance: sitting Mod I, static standing CGA, dynamic standing Min A with lateral instability        AM-PAC 6 CLICK MOBILITY  Turning over in bed (including adjusting bedclothes, sheets and blankets)?: 4  Sitting down on and standing up from a chair with arms (e.g., wheelchair, bedside commode, etc.): 4  Moving from lying on back to sitting on the side of the bed?: 4  Moving to and from a bed to a chair (including a wheelchair)?: 4  Need to walk in hospital room?: 3  Climbing 3-5 steps with a railing?: 3  Basic Mobility Total Score: 22       Therapeutic Activities and Exercises:   pt assisted in functional mobility as noted above    Patient left HOB elevated with call button in reach..    GOALS:   Multidisciplinary Problems     Physical Therapy  Goals        Problem: Physical Therapy Goal    Goal Priority Disciplines Outcome Goal Variances Interventions   Physical Therapy Goal     PT, PT/OT Ongoing (interventions implemented as appropriate)     Description:    Goals to be met by 8/10/2019    1. Pt will perform rolling to the R and L with independence.   2. Pt will perform supine to sit from both sides of the bed with independence.  3. Pt will perform sit to supine with independence.  4. Pt will perform sit to stand transfers with independence.    5. Pt will perform bed <> chair transfers with independence.  6. Pt will perform gait x 400 feet with independence while performing dynamic gait activities                      Time Tracking:     PT Received On: 08/02/19  PT Start Time: 1334     PT Stop Time: 1342  PT Total Time (min): 8 min     Billable Minutes: Therapeutic Exercise 8    Treatment Type: Treatment  PT/PTA: PTA     PTA Visit Number: 1     Misty Ortiz PTA  08/02/2019

## 2019-08-02 NOTE — PROGRESS NOTES
[facility]  Vascular Neurology  Comprehensive Stroke Center  Progress Note    Assessment/Plan:     * Stroke due to embolism of left middle cerebral artery  63 y/o male with PMHx of alcohol and cocaine abuse L MCA syndrome received IV TPA at OSH and transferred to [facility] for further care. CTA with no LVO. MRI with R MCA cortical infarct. TTE unremarkable, no LAE. Etiology currently ESUS vs drug abuse. Will need 30 day event monitor at discharge    Antithrombotics: ASA 81 mg daily    Statins: Lipitor 40 mg daily    Aggressive risk factor modification: HTN     Rehab efforts: The patient has been evaluated by a stroke team provider and the therapy needs have been fully considered based off the presenting complaints and exam findings. The following therapy evaluations are needed: PT evaluate and treat, OT evaluate and treat, SLP evaluate and treat, PM&R evaluate for appropriate placement - SNF in [facility]    Diagnostics ordered/pending: None     VTE prophylaxis: SCD's and heparin 5000 units sc Q8H     BP parameters: Goal -160        Drug abuse  Per daughter, history of drug abuse   Cocaine - use with sister and ex wifes nephew   Daughter would like to remove him from this situation and have him closer to her in [place]   Patient without signs or symptoms of withdrawal  No behavioral problems this admission    Explained and counseled on cessation of drugs although may not be fully appreciated due to aphasia    Cytotoxic cerebral edema  Area of cytotoxic cerebral edema identified when reviewing brain imaging in the territory of the L middle cerebral artery. There is not mass effect associated with it. We will continue to monitor the patients clinical exam for any worsening of symptoms which may indicate expansion of the stroke or the area of the edema resulting in the clinical change. The pattern is suggestive of ESUS vs drug abuse etiology        Alcohol abuse  No signs of symptoms of  withdrawl  Continue folic acid and thiamine  Will need counseling on cessation if aphasia improves    Essential hypertension  Stroke risk factor  SBP <160  BP at goal without antihypertensives    Aphasia  Due to stroke  Aggressive therapy    Received intravenous tissue plasminogen activator (t-PA) in emergency department  Completed close monitoring in [facility] 24 hours post administration   No complications          7/29/2019- Patient more alert, mild aphasia and dysarthria, TTE normal LA, no wall motion abnormalities, continue ASA and statin, rehab placement  7/30/19 - discussed care with older daughter, cathi. Appears that patient is unable to safely return to prior home site due to drug abuse activity (cocaine- him and his family In the area). The daughter wishes to place him in a safer environment near her in [facility]  7/31/19 - no events, neurologically stable, working on placement vs 24 hour care  8/1 referrals sent to facilities, waiting acceptance  8/2/19- stable neuro exam. No events overnight. Education regarding drug abuse given and reinforced     STROKE DOCUMENTATION   Acute Stroke Times   Last Known Normal Date: 07/27/19  Last Known Normal Time: 2200  Symptom Onset Date: 07/27/19  Symptom Onset Time: 2200  Stroke Team Called Date: 07/27/19  Stroke Team Called Time: 2249  Stroke Team Arrival Date: 07/28/19  Stroke Team Arrival Time: 0145  CT Interpretation Time: 2251  Decision to Treat Time for Alteplase: 2320(bolus given)    NIH Scale:  1a. Level of Consciousness: 0-->Alert, keenly responsive  1b. LOC Questions: 2-->Answers neither question correctly  1c. LOC Commands: 2-->Performs neither task correctly  2. Best Gaze: 0-->Normal  3. Visual: 0-->No visual loss  4. Facial Palsy: 2-->Partial paralysis (total or near-total paralysis of lower face)  5a. Motor Arm, Left: 0-->No drift, limb holds 90 (or 45) degrees for full 10 secs  5b. Motor Arm, Right: 1-->Drift, limb holds 90 (or 45) degrees, but drifts  down before full 10 secs, does not hit bed or other support  6a. Motor Leg, Left: 0-->No drift, leg holds 30 degree position for full 5 secs  6b. Motor Leg, Right: 1-->Drift, leg falls by the end of the 5-sec period but does not hit bed  7. Limb Ataxia: 0-->Absent  8. Sensory: 0-->Normal, no sensory loss  9. Best Language: 2-->Severe aphasia, all communication is through fragmentary expression, great need for inference, questioning, and guessing by the listener. Range of information that can be exchanged is limited, listener carries burden of. . . (see row details)  10. Dysarthria: 2-->Severe dysarthria, patients speech is so slurred as to be unintelligible in the absence of or out of proportion to any dysphasia, or is mute/anarthric  11. Extinction and Inattention (formerly Neglect): 0-->No abnormality  Total (NIH Stroke Scale): 12       Modified Alyssa Score: 0  Mongo Coma Scale:    ABCD2 Score:    [id number] Score:   HAS -BLED Score:   ICH Score:   Hunt & Barksdale Classification:      Hemorrhagic change of an Ischemic Stroke: Does this patient have an ischemic stroke with hemorrhagic changes? No     Neurologic Chief Complaint: left MCA - M2 occlusion    Subjective:     Interval History: Patient is seen for follow-up neurological assessment and treatment recommendations:   stable neuro exam. No events overnight. Education regarding drug abuse given and reinforced     HPI, Past Medical, Family, and Social History remains the same as documented in the initial encounter.     Review of Systems   Constitutional: Negative for fever.   Respiratory: Negative for cough.    Gastrointestinal: Negative for vomiting.   Neurological: Positive for facial asymmetry, speech difficulty and weakness.   Psychiatric/Behavioral: Negative for agitation.     Scheduled Meds:   aspirin  81 mg Oral Daily    atorvastatin  40 mg Oral Daily    folic acid  1 mg Oral Daily    heparin (porcine)  5,000 Units Subcutaneous Q8H    senna-docusate  8.6-50 mg  1 tablet Oral BID    thiamine  100 mg Oral Daily     Continuous Infusions:  PRN Meds:acetaminophen, hydrALAZINE, ondansetron, sodium chloride 0.9%    Objective:     Vital Signs (Most Recent):  Temp: 96.8 °F (36 °C) (08/02/19 1148)  Pulse: (!) 56 (08/02/19 1148)  Resp: 18 (08/02/19 1148)  BP: (!) 130/58 (08/02/19 1148)  SpO2: 98 % (08/02/19 1148)  BP Location: Right arm    Vital Signs Range (Last 24H):  Temp:  [96.8 °F (36 °C)-98.5 °F (36.9 °C)]   Pulse:  [56-72]   Resp:  [16-18]   BP: (130-145)/(58-80)   SpO2:  [93 %-98 %]   BP Location: Right arm    Physical Exam   Constitutional: He appears well-developed and well-nourished. No distress.   HENT:   Head: Normocephalic and atraumatic.   Eyes: Pupils are equal, round, and reactive to light.   Cardiovascular: Bradycardia present.   Pulmonary/Chest: Effort normal. No respiratory distress.   Neurological: He is alert.   Skin: Skin is warm and dry.   Vitals reviewed.      Neurological Exam:   LOC: alert  Attention Span: poor  Language: global aphasia, does mimic some commands   Articulation: Dysarthria  Orientation: Not oriented to place, and time  EOM (CN III, IV, VI): Full/intact  Pupils (CN II, III): PERRL  Facial Sensation (CN V): Normal  Facial Movement (CN VII): Lower facial weakness on the right  Motor: Arm left  Normal 5/5  Leg left  Normal 5/5  Arm right  Paresis: 4+/5  Leg right Paresis: 4+/5  Tone: Normal tone throughout    Laboratory:  CMP:   Recent Labs   Lab 08/02/19  0510   CALCIUM 8.6*   ALBUMIN 2.7*   PROT 6.3      K 3.7   CO2 21*      BUN 10   CREATININE 0.8   ALKPHOS 77   ALT 8*   AST 16   BILITOT 0.4     CBC:   Recent Labs   Lab 08/02/19  0510   WBC 4.92   RBC 3.58*   HGB 10.1*   HCT 32.9*   *   MCV 92   MCH 28.2   MCHC 30.7*     Lipid Panel:   Recent Labs   Lab 07/28/19  0458   CHOL 160   LDLCALC 110.6   HDL 33*   TRIG 82     Coagulation: No results for input(s): PT, INR, APTT in the last 168 hours.  Platelet  Aggregation Study: No results for input(s): PLTAGG, PLTAGINTERP, PLTAGREGLACO, ADPPLTAGGREG in the last 168 hours.  Hgb A1C:   Recent Labs   Lab 07/28/19 0458   HGBA1C 4.8     TSH:   Recent Labs   Lab 07/28/19 0458   TSH 0.385*       Diagnostic Results     Brain Imaging   MRI Brain acute interventional protocol 7-28-19 results:      Acute infarct within the left postcentral gyrus of the parietal lobe.  No proximal arterial occlusion demonstrated on MRA.    Remote infarcts within the bilateral precentral gyri and left middle frontal gyrus, consistent with remote bilateral MCA and left TJ infarcts.    Vessel Imaging:  CTA head and neck multiphase 7-28-19 results:  Bilateral frontal lobe encephalomalacia, likely secondary to remote left TJ and bilateral anterior MCA distribution infarcts.    Multifocal areas of irregularity and narrowing involving the intracranial arteries, specifically the left TJ and bilateral M2 branches which may be related to remote insults/atherosclerosis.  No evidence of acute proximal/large vessel occlusion.  However, further evaluation with MRI is suggested for correlation and to exclude acute infarct.    Generalized cerebral volume loss and chronic microvascular ischemic changes.     Cardiac Imaging:  · Normal left ventricular systolic function. The estimated ejection fraction is 55%  · Normal LV diastolic function.  · Normal right ventricular systolic function.  · Normal central venous pressure (3 mm Hg).      CT head 7/28/19  Acute left anterior temporal zone of infarction again noted without hemorrhagic conversion.  Multiple remote areas of infarction and encephalomalacia, as above.  Left ethmoid sinus disease with suspicion for fungal colonization.           BRANDI Jarrett  Comprehensive Stroke Center  Department of Vascular Neurology   [facility]

## 2019-08-02 NOTE — ASSESSMENT & PLAN NOTE
63 y/o male with PMHx of alcohol and cocaine abuse L MCA syndrome received IV TPA at OSH and transferred to [facility] for further care. CTA with no LVO. MRI with R MCA cortical infarct. TTE unremarkable, no LAE. Etiology currently ESUS vs drug abuse. Will need 30 day event monitor at discharge    Antithrombotics: ASA 81 mg daily    Statins: Lipitor 40 mg daily    Aggressive risk factor modification: HTN     Rehab efforts: The patient has been evaluated by a stroke team provider and the therapy needs have been fully considered based off the presenting complaints and exam findings. The following therapy evaluations are needed: PT evaluate and treat, OT evaluate and treat, SLP evaluate and treat, PM&R evaluate for appropriate placement - SNF in [facility]    Diagnostics ordered/pending: None     VTE prophylaxis: SCD's and heparin 5000 units sc Q8H     BP parameters: Goal -160

## 2019-08-02 NOTE — ASSESSMENT & PLAN NOTE
Per daughter, history of drug abuse   Cocaine - use with sister and ex wifes nephew   Daughter would like to remove him from this situation and have him closer to her in [place]   Patient without signs or symptoms of withdrawal  No behavioral problems this admission    Explained and counseled on cessation of drugs although may not be fully appreciated due to aphasia

## 2019-08-02 NOTE — SUBJECTIVE & OBJECTIVE
Neurologic Chief Complaint: left MCA - M2 occlusion    Subjective:     Interval History: Patient is seen for follow-up neurological assessment and treatment recommendations:   stable neuro exam. No events overnight. Education regarding drug abuse given and reinforced     HPI, Past Medical, Family, and Social History remains the same as documented in the initial encounter.     Review of Systems   Constitutional: Negative for fever.   Respiratory: Negative for cough.    Gastrointestinal: Negative for vomiting.   Neurological: Positive for facial asymmetry, speech difficulty and weakness.   Psychiatric/Behavioral: Negative for agitation.     Scheduled Meds:   aspirin  81 mg Oral Daily    atorvastatin  40 mg Oral Daily    folic acid  1 mg Oral Daily    heparin (porcine)  5,000 Units Subcutaneous Q8H    senna-docusate 8.6-50 mg  1 tablet Oral BID    thiamine  100 mg Oral Daily     Continuous Infusions:  PRN Meds:acetaminophen, hydrALAZINE, ondansetron, sodium chloride 0.9%    Objective:     Vital Signs (Most Recent):  Temp: 96.8 °F (36 °C) (08/02/19 1148)  Pulse: (!) 56 (08/02/19 1148)  Resp: 18 (08/02/19 1148)  BP: (!) 130/58 (08/02/19 1148)  SpO2: 98 % (08/02/19 1148)  BP Location: Right arm    Vital Signs Range (Last 24H):  Temp:  [96.8 °F (36 °C)-98.5 °F (36.9 °C)]   Pulse:  [56-72]   Resp:  [16-18]   BP: (130-145)/(58-80)   SpO2:  [93 %-98 %]   BP Location: Right arm    Physical Exam   Constitutional: He appears well-developed and well-nourished. No distress.   HENT:   Head: Normocephalic and atraumatic.   Eyes: Pupils are equal, round, and reactive to light.   Cardiovascular: Bradycardia present.   Pulmonary/Chest: Effort normal. No respiratory distress.   Neurological: He is alert.   Skin: Skin is warm and dry.   Vitals reviewed.      Neurological Exam:   LOC: alert  Attention Span: poor  Language: global aphasia, does mimic some commands   Articulation: Dysarthria  Orientation: Not oriented to place, and  time  EOM (CN III, IV, VI): Full/intact  Pupils (CN II, III): PERRL  Facial Sensation (CN V): Normal  Facial Movement (CN VII): Lower facial weakness on the right  Motor: Arm left  Normal 5/5  Leg left  Normal 5/5  Arm right  Paresis: 4+/5  Leg right Paresis: 4+/5  Tone: Normal tone throughout    Laboratory:  CMP:   Recent Labs   Lab 08/02/19  0510   CALCIUM 8.6*   ALBUMIN 2.7*   PROT 6.3      K 3.7   CO2 21*      BUN 10   CREATININE 0.8   ALKPHOS 77   ALT 8*   AST 16   BILITOT 0.4     CBC:   Recent Labs   Lab 08/02/19  0510   WBC 4.92   RBC 3.58*   HGB 10.1*   HCT 32.9*   *   MCV 92   MCH 28.2   MCHC 30.7*     Lipid Panel:   Recent Labs   Lab 07/28/19  0458   CHOL 160   LDLCALC 110.6   HDL 33*   TRIG 82     Coagulation: No results for input(s): PT, INR, APTT in the last 168 hours.  Platelet Aggregation Study: No results for input(s): PLTAGG, PLTAGINTERP, PLTAGREGLACO, ADPPLTAGGREG in the last 168 hours.  Hgb A1C:   Recent Labs   Lab 07/28/19  0458   HGBA1C 4.8     TSH:   Recent Labs   Lab 07/28/19  0458   TSH 0.385*       Diagnostic Results     Brain Imaging   MRI Brain acute interventional protocol 7-28-19 results:      Acute infarct within the left postcentral gyrus of the parietal lobe.  No proximal arterial occlusion demonstrated on MRA.    Remote infarcts within the bilateral precentral gyri and left middle frontal gyrus, consistent with remote bilateral MCA and left TJ infarcts.    Vessel Imaging:  CTA head and neck multiphase 7-28-19 results:  Bilateral frontal lobe encephalomalacia, likely secondary to remote left TJ and bilateral anterior MCA distribution infarcts.    Multifocal areas of irregularity and narrowing involving the intracranial arteries, specifically the left TJ and bilateral M2 branches which may be related to remote insults/atherosclerosis.  No evidence of acute proximal/large vessel occlusion.  However, further evaluation with MRI is suggested for correlation and to  exclude acute infarct.    Generalized cerebral volume loss and chronic microvascular ischemic changes.     Cardiac Imaging:  · Normal left ventricular systolic function. The estimated ejection fraction is 55%  · Normal LV diastolic function.  · Normal right ventricular systolic function.  · Normal central venous pressure (3 mm Hg).      CT head 7/28/19  Acute left anterior temporal zone of infarction again noted without hemorrhagic conversion.  Multiple remote areas of infarction and encephalomalacia, as above.  Left ethmoid sinus disease with suspicion for fungal colonization.

## 2019-08-02 NOTE — ASSESSMENT & PLAN NOTE
Completed close monitoring in [facility] 24 hours post administration   No complications

## 2019-08-03 LAB
ALBUMIN SERPL BCP-MCNC: 2.7 G/DL (ref 3.5–5.2)
ALP SERPL-CCNC: 79 U/L (ref 55–135)
ALT SERPL W/O P-5'-P-CCNC: 10 U/L (ref 10–44)
ANION GAP SERPL CALC-SCNC: 7 MMOL/L (ref 8–16)
AST SERPL-CCNC: 17 U/L (ref 10–40)
BASOPHILS # BLD AUTO: 0.02 K/UL (ref 0–0.2)
BASOPHILS NFR BLD: 0.4 % (ref 0–1.9)
BILIRUB SERPL-MCNC: 0.5 MG/DL (ref 0.1–1)
BUN SERPL-MCNC: 8 MG/DL (ref 8–23)
CALCIUM SERPL-MCNC: 8.8 MG/DL (ref 8.7–10.5)
CHLORIDE SERPL-SCNC: 104 MMOL/L (ref 95–110)
CO2 SERPL-SCNC: 26 MMOL/L (ref 23–29)
CREAT SERPL-MCNC: 0.8 MG/DL (ref 0.5–1.4)
DIFFERENTIAL METHOD: ABNORMAL
EOSINOPHIL # BLD AUTO: 0.3 K/UL (ref 0–0.5)
EOSINOPHIL NFR BLD: 5.6 % (ref 0–8)
ERYTHROCYTE [DISTWIDTH] IN BLOOD BY AUTOMATED COUNT: 13.1 % (ref 11.5–14.5)
EST. GFR  (AFRICAN AMERICAN): >60 ML/MIN/1.73 M^2
EST. GFR  (NON AFRICAN AMERICAN): >60 ML/MIN/1.73 M^2
GLUCOSE SERPL-MCNC: 78 MG/DL (ref 70–110)
HCT VFR BLD AUTO: 32.6 % (ref 40–54)
HGB BLD-MCNC: 10 G/DL (ref 14–18)
IMM GRANULOCYTES # BLD AUTO: 0.02 K/UL (ref 0–0.04)
IMM GRANULOCYTES NFR BLD AUTO: 0.4 % (ref 0–0.5)
LYMPHOCYTES # BLD AUTO: 2.1 K/UL (ref 1–4.8)
LYMPHOCYTES NFR BLD: 38.4 % (ref 18–48)
MAGNESIUM SERPL-MCNC: 2 MG/DL (ref 1.6–2.6)
MCH RBC QN AUTO: 28.3 PG (ref 27–31)
MCHC RBC AUTO-ENTMCNC: 30.7 G/DL (ref 32–36)
MCV RBC AUTO: 92 FL (ref 82–98)
MONOCYTES # BLD AUTO: 0.6 K/UL (ref 0.3–1)
MONOCYTES NFR BLD: 10.9 % (ref 4–15)
NEUTROPHILS # BLD AUTO: 2.4 K/UL (ref 1.8–7.7)
NEUTROPHILS NFR BLD: 44.3 % (ref 38–73)
NRBC BLD-RTO: 0 /100 WBC
PHOSPHATE SERPL-MCNC: 3.3 MG/DL (ref 2.7–4.5)
PLATELET # BLD AUTO: 364 K/UL (ref 150–350)
PMV BLD AUTO: 10.2 FL (ref 9.2–12.9)
POCT GLUCOSE: 101 MG/DL (ref 70–110)
POCT GLUCOSE: 108 MG/DL (ref 70–110)
POCT GLUCOSE: 114 MG/DL (ref 70–110)
POCT GLUCOSE: 483 MG/DL (ref 70–110)
POTASSIUM SERPL-SCNC: 3.9 MMOL/L (ref 3.5–5.1)
PROT SERPL-MCNC: 6.2 G/DL (ref 6–8.4)
RBC # BLD AUTO: 3.53 M/UL (ref 4.6–6.2)
SODIUM SERPL-SCNC: 137 MMOL/L (ref 136–145)
WBC # BLD AUTO: 5.39 K/UL (ref 3.9–12.7)

## 2019-08-03 PROCEDURE — 85025 COMPLETE CBC W/AUTO DIFF WBC: CPT

## 2019-08-03 PROCEDURE — 83735 ASSAY OF MAGNESIUM: CPT

## 2019-08-03 PROCEDURE — 36415 COLL VENOUS BLD VENIPUNCTURE: CPT

## 2019-08-03 PROCEDURE — 99233 SBSQ HOSP IP/OBS HIGH 50: CPT | Mod: ,,, | Performed by: PSYCHIATRY & NEUROLOGY

## 2019-08-03 PROCEDURE — 63600175 PHARM REV CODE 636 W HCPCS: Performed by: PHYSICIAN ASSISTANT

## 2019-08-03 PROCEDURE — 80053 COMPREHEN METABOLIC PANEL: CPT

## 2019-08-03 PROCEDURE — 99233 PR SUBSEQUENT HOSPITAL CARE,LEVL III: ICD-10-PCS | Mod: ,,, | Performed by: PSYCHIATRY & NEUROLOGY

## 2019-08-03 PROCEDURE — 20600001 HC STEP DOWN PRIVATE ROOM

## 2019-08-03 PROCEDURE — 25000003 PHARM REV CODE 250: Performed by: PHYSICIAN ASSISTANT

## 2019-08-03 PROCEDURE — 84100 ASSAY OF PHOSPHORUS: CPT

## 2019-08-03 RX ADMIN — HEPARIN SODIUM 5000 UNITS: 5000 INJECTION, SOLUTION INTRAVENOUS; SUBCUTANEOUS at 08:08

## 2019-08-03 RX ADMIN — ASPIRIN 81 MG CHEWABLE TABLET 81 MG: 81 TABLET CHEWABLE at 08:08

## 2019-08-03 RX ADMIN — HEPARIN SODIUM 5000 UNITS: 5000 INJECTION, SOLUTION INTRAVENOUS; SUBCUTANEOUS at 05:08

## 2019-08-03 RX ADMIN — SENNOSIDES,DOCUSATE SODIUM 1 TABLET: 8.6; 5 TABLET, FILM COATED ORAL at 08:08

## 2019-08-03 RX ADMIN — HEPARIN SODIUM 5000 UNITS: 5000 INJECTION, SOLUTION INTRAVENOUS; SUBCUTANEOUS at 02:08

## 2019-08-03 RX ADMIN — Medication 100 MG: at 08:08

## 2019-08-03 RX ADMIN — ATORVASTATIN CALCIUM 40 MG: 20 TABLET, FILM COATED ORAL at 08:08

## 2019-08-03 RX ADMIN — FOLIC ACID 1 MG: 1 TABLET ORAL at 08:08

## 2019-08-03 NOTE — SUBJECTIVE & OBJECTIVE
"Neurologic Chief Complaint: left MCA - M2 occlusion    Subjective:     Interval History: Patient is seen for follow-up neurological assessment and treatment recommendations:   Remains severely aphasic and dysarthric     HPI, Past Medical, Family, and Social History remains the same as documented in the initial encounter.     Review of Systems   Constitutional: Negative for fever.   Respiratory: Negative for cough.    Gastrointestinal: Negative for vomiting.   Neurological: Positive for facial asymmetry, speech difficulty and weakness.   Psychiatric/Behavioral: Negative for agitation.     Scheduled Meds:   aspirin  81 mg Oral Daily    atorvastatin  40 mg Oral Daily    folic acid  1 mg Oral Daily    heparin (porcine)  5,000 Units Subcutaneous Q8H    senna-docusate 8.6-50 mg  1 tablet Oral BID    thiamine  100 mg Oral Daily     Continuous Infusions:  PRN Meds:acetaminophen, hydrALAZINE, ondansetron, sodium chloride 0.9%    Objective:     Vital Signs (Most Recent):  Temp: 97.1 °F (36.2 °C) (08/03/19 0800)  Pulse: (!) 51 (08/03/19 0802)  Resp: 16 (08/03/19 0800)  BP: 132/78 (08/03/19 0800)  SpO2: 98 % (08/03/19 0800)  BP Location: Right arm    Vital Signs Range (Last 24H):  Temp:  [96.4 °F (35.8 °C)-97.9 °F (36.6 °C)]   Pulse:  [51-68]   Resp:  [16-18]   BP: (112-134)/(60-92)   SpO2:  [98 %-100 %]   BP Location: Right arm    Physical Exam   Constitutional: He appears well-developed and well-nourished. No distress.   HENT:   Head: Normocephalic and atraumatic.   Eyes: Pupils are equal, round, and reactive to light.   Cardiovascular: Bradycardia present.   Pulmonary/Chest: Effort normal. No respiratory distress.   Neurological: He is alert.   Skin: Skin is warm and dry.   Vitals reviewed.      Neurological Exam:   LOC: alert  Attention Span: poor  Language: global aphasia, says "I don't know" when asked to follow commands  Articulation: Dysarthria  Orientation: Not oriented to place, and time  EOM (CN III, IV, VI): " Full/intact  Pupils (CN II, III): PERRL  Facial Sensation (CN V): Normal  Facial Movement (CN VII): Lower facial weakness on the right  Motor: Arm left  Normal 5/5  Leg left  Normal 5/5  Arm right  Paresis: 4+/5  Leg right Paresis: 4+/5  Tone: Normal tone throughout    Laboratory:  CMP:   Recent Labs   Lab 08/03/19  0545   CALCIUM 8.8   ALBUMIN 2.7*   PROT 6.2      K 3.9   CO2 26      BUN 8   CREATININE 0.8   ALKPHOS 79   ALT 10   AST 17   BILITOT 0.5     CBC:   Recent Labs   Lab 08/03/19  0545   WBC 5.39   RBC 3.53*   HGB 10.0*   HCT 32.6*   *   MCV 92   MCH 28.3   MCHC 30.7*     Lipid Panel:   Recent Labs   Lab 07/28/19  0458   CHOL 160   LDLCALC 110.6   HDL 33*   TRIG 82     Coagulation: No results for input(s): PT, INR, APTT in the last 168 hours.  Platelet Aggregation Study: No results for input(s): PLTAGG, PLTAGINTERP, PLTAGREGLACO, ADPPLTAGGREG in the last 168 hours.  Hgb A1C:   Recent Labs   Lab 07/28/19  0458   HGBA1C 4.8     TSH:   Recent Labs   Lab 07/28/19  0458   TSH 0.385*       Diagnostic Results     Brain Imaging   MRI Brain acute interventional protocol 7-28-19 results:      Acute infarct within the left postcentral gyrus of the parietal lobe.  No proximal arterial occlusion demonstrated on MRA.    Remote infarcts within the bilateral precentral gyri and left middle frontal gyrus, consistent with remote bilateral MCA and left TJ infarcts.    Vessel Imaging:  CTA head and neck multiphase 7-28-19 results:  Bilateral frontal lobe encephalomalacia, likely secondary to remote left TJ and bilateral anterior MCA distribution infarcts.    Multifocal areas of irregularity and narrowing involving the intracranial arteries, specifically the left TJ and bilateral M2 branches which may be related to remote insults/atherosclerosis.  No evidence of acute proximal/large vessel occlusion.  However, further evaluation with MRI is suggested for correlation and to exclude acute  infarct.    Generalized cerebral volume loss and chronic microvascular ischemic changes.     Cardiac Imaging:  · Normal left ventricular systolic function. The estimated ejection fraction is 55%  · Normal LV diastolic function.  · Normal right ventricular systolic function.  · Normal central venous pressure (3 mm Hg).      CT head 7/28/19  Acute left anterior temporal zone of infarction again noted without hemorrhagic conversion.  Multiple remote areas of infarction and encephalomalacia, as above.  Left ethmoid sinus disease with suspicion for fungal colonization.

## 2019-08-03 NOTE — ASSESSMENT & PLAN NOTE
63 y/o male with PMHx of alcohol and cocaine abuse L MCA syndrome received IV TPA at OSH and transferred to [facility] for further care. CTA with no LVO. MRI with R MCA cortical infarct. TTE unremarkable, no LAE. Etiology currently ESUS vs drug abuse. Will need 30 day event monitor at discharge    Antithrombotics: ASA 81 mg daily    Statins: Lipitor 40 mg daily    Aggressive risk factor modification: HTN     Rehab efforts: The patient has been evaluated by a stroke team provider and the therapy needs have been fully considered based off the presenting complaints and exam findings. The following therapy evaluations are needed: PT evaluate and treat, OT evaluate and treat, SLP evaluate and treat, PM&R evaluate for appropriate placement - SNF in [facility]    Diagnostics ordered/pending: None     VTE prophylaxis: SCD's and heparin 5000 units sc Q8H     BP parameters: Goal -160

## 2019-08-03 NOTE — PLAN OF CARE
Problem: Adult Inpatient Plan of Care  Goal: Plan of Care Review  Outcome: Ongoing (interventions implemented as appropriate)  POC reviewed with patient. Requires reinforcement. No evidence of learning d/t expressive aphasia. VSS throughout shift. Fall/safety precautions implemented and maintained. Blood glucose monitored. Denies any pain this shift. Bed locked in lowest position. Call bell within reach. Will continue to monitor.

## 2019-08-03 NOTE — ASSESSMENT & PLAN NOTE
Completed close monitoring in [facility] 24 hours post administration   No complications

## 2019-08-03 NOTE — PROGRESS NOTES
[facility]  Vascular Neurology  Comprehensive Stroke Center  Progress Note    Assessment/Plan:     * Stroke due to embolism of left middle cerebral artery  63 y/o male with PMHx of alcohol and cocaine abuse L MCA syndrome received IV TPA at OSH and transferred to [facility] for further care. CTA with no LVO. MRI with R MCA cortical infarct. TTE unremarkable, no LAE. Etiology currently ESUS vs drug abuse. Will need 30 day event monitor at discharge    Antithrombotics: ASA 81 mg daily    Statins: Lipitor 40 mg daily    Aggressive risk factor modification: HTN     Rehab efforts: The patient has been evaluated by a stroke team provider and the therapy needs have been fully considered based off the presenting complaints and exam findings. The following therapy evaluations are needed: PT evaluate and treat, OT evaluate and treat, SLP evaluate and treat, PM&R evaluate for appropriate placement - SNF in [facility]    Diagnostics ordered/pending: None     VTE prophylaxis: SCD's and heparin 5000 units sc Q8H     BP parameters: Goal -160        Drug abuse  Per daughter, history of drug abuse   Cocaine - use with sister and ex wifes nephew   Daughter would like to remove him from this situation and have him closer to her in [place]   Patient without signs or symptoms of withdrawal  No behavioral problems this admission    Explained and counseled on cessation of drugs although may not be fully appreciated due to aphasia    Cytotoxic cerebral edema  Area of cytotoxic cerebral edema identified when reviewing brain imaging in the territory of the L middle cerebral artery. There is not mass effect associated with it. We will continue to monitor the patients clinical exam for any worsening of symptoms which may indicate expansion of the stroke or the area of the edema resulting in the clinical change. The pattern is suggestive of ESUS vs drug abuse etiology        Alcohol abuse  No signs of symptoms of  withdrawl  Continue folic acid and thiamine  Will need counseling on cessation if aphasia improves    Essential hypertension  Stroke risk factor  SBP <160  BP at goal without antihypertensives    Aphasia  Due to stroke  Aggressive therapy    Received intravenous tissue plasminogen activator (t-PA) in emergency department  Completed close monitoring in [facility] 24 hours post administration   No complications          7/29/2019- Patient more alert, mild aphasia and dysarthria, TTE normal LA, no wall motion abnormalities, continue ASA and statin, rehab placement  7/30/19 - discussed care with older daughter, cathi. Appears that patient is unable to safely return to prior home site due to drug abuse activity (cocaine- him and his family In the area). The daughter wishes to place him in a safer environment near her in [facility]  7/31/19 - no events, neurologically stable, working on placement vs 24 hour care  8/1 referrals sent to facilities, waiting acceptance  8/2/19- stable neuro exam. No events overnight. Education regarding drug abuse given and reinforced       STROKE DOCUMENTATION   Acute Stroke Times   Last Known Normal Date: 07/27/19  Last Known Normal Time: 2200  Symptom Onset Date: 07/27/19  Symptom Onset Time: 2200  Stroke Team Called Date: 07/27/19  Stroke Team Called Time: 2249  Stroke Team Arrival Date: 07/28/19  Stroke Team Arrival Time: 0145  CT Interpretation Time: 2251  Decision to Treat Time for Alteplase: 2320(bolus given)    NIH Scale:  1a. Level of Consciousness: 0-->Alert, keenly responsive  1b. LOC Questions: 2-->Answers neither question correctly  1c. LOC Commands: 2-->Performs neither task correctly  2. Best Gaze: 0-->Normal  3. Visual: 0-->No visual loss  4. Facial Palsy: 2-->Partial paralysis (total or near-total paralysis of lower face)  5a. Motor Arm, Left: 0-->No drift, limb holds 90 (or 45) degrees for full 10 secs  5b. Motor Arm, Right: 1-->Drift, limb holds 90 (or 45) degrees, but  drifts down before full 10 secs, does not hit bed or other support  6a. Motor Leg, Left: 0-->No drift, leg holds 30 degree position for full 5 secs  6b. Motor Leg, Right: 1-->Drift, leg falls by the end of the 5-sec period but does not hit bed  7. Limb Ataxia: 0-->Absent  8. Sensory: 0-->Normal, no sensory loss  9. Best Language: 2-->Severe aphasia, all communication is through fragmentary expression, great need for inference, questioning, and guessing by the listener. Range of information that can be exchanged is limited, listener carries burden of. . . (see row details)  10. Dysarthria: 2-->Severe dysarthria, patients speech is so slurred as to be unintelligible in the absence of or out of proportion to any dysphasia, or is mute/anarthric  11. Extinction and Inattention (formerly Neglect): 0-->No abnormality  Total (NIH Stroke Scale): 12       Modified Ridott Score: 0  [place] Coma Scale:    ABCD2 Score:    [id number] Score:   HAS -BLED Score:   ICH Score:   Hunt & Barksdale Classification:      Hemorrhagic change of an Ischemic Stroke: Does this patient have an ischemic stroke with hemorrhagic changes? No     Neurologic Chief Complaint: left MCA - M2 occlusion    Subjective:     Interval History: Patient is seen for follow-up neurological assessment and treatment recommendations:   Remains severely aphasic and dysarthric     HPI, Past Medical, Family, and Social History remains the same as documented in the initial encounter.     Review of Systems   Constitutional: Negative for fever.   Respiratory: Negative for cough.    Gastrointestinal: Negative for vomiting.   Neurological: Positive for facial asymmetry, speech difficulty and weakness.   Psychiatric/Behavioral: Negative for agitation.     Scheduled Meds:   aspirin  81 mg Oral Daily    atorvastatin  40 mg Oral Daily    folic acid  1 mg Oral Daily    heparin (porcine)  5,000 Units Subcutaneous Q8H    senna-docusate 8.6-50 mg  1 tablet Oral BID    thiamine   "100 mg Oral Daily     Continuous Infusions:  PRN Meds:acetaminophen, hydrALAZINE, ondansetron, sodium chloride 0.9%    Objective:     Vital Signs (Most Recent):  Temp: 97.1 °F (36.2 °C) (08/03/19 0800)  Pulse: (!) 51 (08/03/19 0802)  Resp: 16 (08/03/19 0800)  BP: 132/78 (08/03/19 0800)  SpO2: 98 % (08/03/19 0800)  BP Location: Right arm    Vital Signs Range (Last 24H):  Temp:  [96.4 °F (35.8 °C)-97.9 °F (36.6 °C)]   Pulse:  [51-68]   Resp:  [16-18]   BP: (112-134)/(60-92)   SpO2:  [98 %-100 %]   BP Location: Right arm    Physical Exam   Constitutional: He appears well-developed and well-nourished. No distress.   HENT:   Head: Normocephalic and atraumatic.   Eyes: Pupils are equal, round, and reactive to light.   Cardiovascular: Bradycardia present.   Pulmonary/Chest: Effort normal. No respiratory distress.   Neurological: He is alert.   Skin: Skin is warm and dry.   Vitals reviewed.      Neurological Exam:   LOC: alert  Attention Span: poor  Language: global aphasia, says "I don't know" when asked to follow commands  Articulation: Dysarthria  Orientation: Not oriented to place, and time  EOM (CN III, IV, VI): Full/intact  Pupils (CN II, III): PERRL  Facial Sensation (CN V): Normal  Facial Movement (CN VII): Lower facial weakness on the right  Motor: Arm left  Normal 5/5  Leg left  Normal 5/5  Arm right  Paresis: 4+/5  Leg right Paresis: 4+/5  Tone: Normal tone throughout    Laboratory:  CMP:   Recent Labs   Lab 08/03/19  0545   CALCIUM 8.8   ALBUMIN 2.7*   PROT 6.2      K 3.9   CO2 26      BUN 8   CREATININE 0.8   ALKPHOS 79   ALT 10   AST 17   BILITOT 0.5     CBC:   Recent Labs   Lab 08/03/19  0545   WBC 5.39   RBC 3.53*   HGB 10.0*   HCT 32.6*   *   MCV 92   MCH 28.3   MCHC 30.7*     Lipid Panel:   Recent Labs   Lab 07/28/19  0458   CHOL 160   LDLCALC 110.6   HDL 33*   TRIG 82     Coagulation: No results for input(s): PT, INR, APTT in the last 168 hours.  Platelet Aggregation Study: No results " for input(s): PLTAGG, PLTAGINTERP, PLTAGREGLACO, ADPPLTAGGREG in the last 168 hours.  Hgb A1C:   Recent Labs   Lab 07/28/19  0458   HGBA1C 4.8     TSH:   Recent Labs   Lab 07/28/19 0458   TSH 0.385*       Diagnostic Results     Brain Imaging   MRI Brain acute interventional protocol 7-28-19 results:      Acute infarct within the left postcentral gyrus of the parietal lobe.  No proximal arterial occlusion demonstrated on MRA.    Remote infarcts within the bilateral precentral gyri and left middle frontal gyrus, consistent with remote bilateral MCA and left TJ infarcts.    Vessel Imaging:  CTA head and neck multiphase 7-28-19 results:  Bilateral frontal lobe encephalomalacia, likely secondary to remote left TJ and bilateral anterior MCA distribution infarcts.    Multifocal areas of irregularity and narrowing involving the intracranial arteries, specifically the left TJ and bilateral M2 branches which may be related to remote insults/atherosclerosis.  No evidence of acute proximal/large vessel occlusion.  However, further evaluation with MRI is suggested for correlation and to exclude acute infarct.    Generalized cerebral volume loss and chronic microvascular ischemic changes.     Cardiac Imaging:  · Normal left ventricular systolic function. The estimated ejection fraction is 55%  · Normal LV diastolic function.  · Normal right ventricular systolic function.  · Normal central venous pressure (3 mm Hg).      CT head 7/28/19  Acute left anterior temporal zone of infarction again noted without hemorrhagic conversion.  Multiple remote areas of infarction and encephalomalacia, as above.  Left ethmoid sinus disease with suspicion for fungal colonization.           BRANDI Jarrett  Comprehensive Stroke Center  Department of Vascular Neurology   [facility]

## 2019-08-04 LAB
ALBUMIN SERPL BCP-MCNC: 2.6 G/DL (ref 3.5–5.2)
ALP SERPL-CCNC: 96 U/L (ref 55–135)
ALT SERPL W/O P-5'-P-CCNC: 12 U/L (ref 10–44)
ANION GAP SERPL CALC-SCNC: 9 MMOL/L (ref 8–16)
AST SERPL-CCNC: 17 U/L (ref 10–40)
BASOPHILS # BLD AUTO: 0.04 K/UL (ref 0–0.2)
BASOPHILS NFR BLD: 0.7 % (ref 0–1.9)
BILIRUB SERPL-MCNC: 0.4 MG/DL (ref 0.1–1)
BUN SERPL-MCNC: 14 MG/DL (ref 8–23)
CALCIUM SERPL-MCNC: 9.1 MG/DL (ref 8.7–10.5)
CHLORIDE SERPL-SCNC: 105 MMOL/L (ref 95–110)
CO2 SERPL-SCNC: 25 MMOL/L (ref 23–29)
CREAT SERPL-MCNC: 0.8 MG/DL (ref 0.5–1.4)
DIFFERENTIAL METHOD: ABNORMAL
EOSINOPHIL # BLD AUTO: 0.3 K/UL (ref 0–0.5)
EOSINOPHIL NFR BLD: 4.6 % (ref 0–8)
ERYTHROCYTE [DISTWIDTH] IN BLOOD BY AUTOMATED COUNT: 13.3 % (ref 11.5–14.5)
EST. GFR  (AFRICAN AMERICAN): >60 ML/MIN/1.73 M^2
EST. GFR  (NON AFRICAN AMERICAN): >60 ML/MIN/1.73 M^2
GLUCOSE SERPL-MCNC: 90 MG/DL (ref 70–110)
HCT VFR BLD AUTO: 30.8 % (ref 40–54)
HGB BLD-MCNC: 9.6 G/DL (ref 14–18)
IMM GRANULOCYTES # BLD AUTO: 0.06 K/UL (ref 0–0.04)
IMM GRANULOCYTES NFR BLD AUTO: 1 % (ref 0–0.5)
LYMPHOCYTES # BLD AUTO: 2.3 K/UL (ref 1–4.8)
LYMPHOCYTES NFR BLD: 38.5 % (ref 18–48)
MAGNESIUM SERPL-MCNC: 2 MG/DL (ref 1.6–2.6)
MCH RBC QN AUTO: 28.3 PG (ref 27–31)
MCHC RBC AUTO-ENTMCNC: 31.2 G/DL (ref 32–36)
MCV RBC AUTO: 91 FL (ref 82–98)
MONOCYTES # BLD AUTO: 0.6 K/UL (ref 0.3–1)
MONOCYTES NFR BLD: 9.7 % (ref 4–15)
NEUTROPHILS # BLD AUTO: 2.7 K/UL (ref 1.8–7.7)
NEUTROPHILS NFR BLD: 45.5 % (ref 38–73)
NRBC BLD-RTO: 0 /100 WBC
PHOSPHATE SERPL-MCNC: 3.2 MG/DL (ref 2.7–4.5)
PLATELET # BLD AUTO: 367 K/UL (ref 150–350)
PMV BLD AUTO: 10.5 FL (ref 9.2–12.9)
POCT GLUCOSE: 108 MG/DL (ref 70–110)
POCT GLUCOSE: 119 MG/DL (ref 70–110)
POCT GLUCOSE: 186 MG/DL (ref 70–110)
POCT GLUCOSE: 435 MG/DL (ref 70–110)
POTASSIUM SERPL-SCNC: 3.9 MMOL/L (ref 3.5–5.1)
PROT SERPL-MCNC: 6.1 G/DL (ref 6–8.4)
RBC # BLD AUTO: 3.39 M/UL (ref 4.6–6.2)
SODIUM SERPL-SCNC: 139 MMOL/L (ref 136–145)
WBC # BLD AUTO: 5.85 K/UL (ref 3.9–12.7)

## 2019-08-04 PROCEDURE — 85025 COMPLETE CBC W/AUTO DIFF WBC: CPT

## 2019-08-04 PROCEDURE — 63600175 PHARM REV CODE 636 W HCPCS: Performed by: PHYSICIAN ASSISTANT

## 2019-08-04 PROCEDURE — 80053 COMPREHEN METABOLIC PANEL: CPT

## 2019-08-04 PROCEDURE — 36415 COLL VENOUS BLD VENIPUNCTURE: CPT

## 2019-08-04 PROCEDURE — 99233 PR SUBSEQUENT HOSPITAL CARE,LEVL III: ICD-10-PCS | Mod: ,,, | Performed by: PSYCHIATRY & NEUROLOGY

## 2019-08-04 PROCEDURE — 20600001 HC STEP DOWN PRIVATE ROOM

## 2019-08-04 PROCEDURE — 83735 ASSAY OF MAGNESIUM: CPT

## 2019-08-04 PROCEDURE — 25000003 PHARM REV CODE 250: Performed by: PHYSICIAN ASSISTANT

## 2019-08-04 PROCEDURE — 99233 SBSQ HOSP IP/OBS HIGH 50: CPT | Mod: ,,, | Performed by: PSYCHIATRY & NEUROLOGY

## 2019-08-04 PROCEDURE — 84100 ASSAY OF PHOSPHORUS: CPT

## 2019-08-04 RX ADMIN — ATORVASTATIN CALCIUM 40 MG: 20 TABLET, FILM COATED ORAL at 08:08

## 2019-08-04 RX ADMIN — ASPIRIN 81 MG CHEWABLE TABLET 81 MG: 81 TABLET CHEWABLE at 08:08

## 2019-08-04 RX ADMIN — HEPARIN SODIUM 5000 UNITS: 5000 INJECTION, SOLUTION INTRAVENOUS; SUBCUTANEOUS at 01:08

## 2019-08-04 RX ADMIN — HEPARIN SODIUM 5000 UNITS: 5000 INJECTION, SOLUTION INTRAVENOUS; SUBCUTANEOUS at 08:08

## 2019-08-04 RX ADMIN — SENNOSIDES,DOCUSATE SODIUM 1 TABLET: 8.6; 5 TABLET, FILM COATED ORAL at 08:08

## 2019-08-04 RX ADMIN — FOLIC ACID 1 MG: 1 TABLET ORAL at 08:08

## 2019-08-04 RX ADMIN — Medication 100 MG: at 08:08

## 2019-08-04 RX ADMIN — HEPARIN SODIUM 5000 UNITS: 5000 INJECTION, SOLUTION INTRAVENOUS; SUBCUTANEOUS at 04:08

## 2019-08-04 NOTE — ASSESSMENT & PLAN NOTE
Completed close monitoring in [facility] 24 hours post administration   No complications

## 2019-08-04 NOTE — SUBJECTIVE & OBJECTIVE
"Neurologic Chief Complaint: left MCA - M2 occlusion    Subjective:     Interval History: Patient is seen for follow-up neurological assessment and treatment recommendations:   No events overnight, unable to engaged with singing or counting     HPI, Past Medical, Family, and Social History remains the same as documented in the initial encounter.     Review of Systems   Constitutional: Negative for fever.   Respiratory: Negative for cough.    Gastrointestinal: Negative for vomiting.   Neurological: Positive for facial asymmetry, speech difficulty and weakness.   Psychiatric/Behavioral: Negative for agitation.     Scheduled Meds:   aspirin  81 mg Oral Daily    atorvastatin  40 mg Oral Daily    folic acid  1 mg Oral Daily    heparin (porcine)  5,000 Units Subcutaneous Q8H    senna-docusate 8.6-50 mg  1 tablet Oral BID    thiamine  100 mg Oral Daily     Continuous Infusions:  PRN Meds:acetaminophen, hydrALAZINE, ondansetron, sodium chloride 0.9%    Objective:     Vital Signs (Most Recent):  Temp: 96.8 °F (36 °C) (08/04/19 1200)  Pulse: 62 (08/04/19 1200)  Resp: 18 (08/04/19 1200)  BP: 116/66 (08/04/19 1200)  SpO2: 100 % (08/04/19 1200)  BP Location: Right arm    Vital Signs Range (Last 24H):  Temp:  [96.7 °F (35.9 °C)-98.7 °F (37.1 °C)]   Pulse:  [58-81]   Resp:  [16-18]   BP: (107-127)/(59-75)   SpO2:  [93 %-100 %]   BP Location: Right arm    Physical Exam   Constitutional: He appears well-developed and well-nourished. No distress.   HENT:   Head: Normocephalic and atraumatic.   Eyes: Pupils are equal, round, and reactive to light.   Cardiovascular: Normal rate.   Pulmonary/Chest: Effort normal. No respiratory distress.   Neurological: He is alert.   Skin: Skin is warm and dry.   Vitals reviewed.      Neurological Exam:   LOC: alert  Attention Span: poor  Language: global aphasia, says "I don't know" when asked to follow commands. Not able to engage with singing or counting   Articulation: Dysarthria  Orientation: " Not oriented to place, and time  EOM (CN III, IV, VI): Full/intact  Pupils (CN II, III): PERRL  Facial Sensation (CN V): Normal  Facial Movement (CN VII): Lower facial weakness on the right  Motor: Arm left  Normal 5/5  Leg left  Normal 5/5  Arm right  Paresis: 4+/5  Leg right Paresis: 4+/5  Tone: Normal tone throughout    Laboratory:  CMP:   Recent Labs   Lab 08/04/19  0359   CALCIUM 9.1   ALBUMIN 2.6*   PROT 6.1      K 3.9   CO2 25      BUN 14   CREATININE 0.8   ALKPHOS 96   ALT 12   AST 17   BILITOT 0.4     CBC:   Recent Labs   Lab 08/04/19  0359   WBC 5.85   RBC 3.39*   HGB 9.6*   HCT 30.8*   *   MCV 91   MCH 28.3   MCHC 31.2*     Lipid Panel:   No results for input(s): CHOL, LDLCALC, HDL, TRIG in the last 168 hours.  Coagulation: No results for input(s): PT, INR, APTT in the last 168 hours.  Platelet Aggregation Study: No results for input(s): PLTAGG, PLTAGINTERP, PLTAGREGLACO, ADPPLTAGGREG in the last 168 hours.  Hgb A1C:   No results for input(s): HGBA1C in the last 168 hours.  TSH:   No results for input(s): TSH in the last 168 hours.    Diagnostic Results     Brain Imaging   MRI Brain acute interventional protocol 7-28-19 results:      Acute infarct within the left postcentral gyrus of the parietal lobe.  No proximal arterial occlusion demonstrated on MRA.    Remote infarcts within the bilateral precentral gyri and left middle frontal gyrus, consistent with remote bilateral MCA and left TJ infarcts.    Vessel Imaging:  CTA head and neck multiphase 7-28-19 results:  Bilateral frontal lobe encephalomalacia, likely secondary to remote left TJ and bilateral anterior MCA distribution infarcts.    Multifocal areas of irregularity and narrowing involving the intracranial arteries, specifically the left TJ and bilateral M2 branches which may be related to remote insults/atherosclerosis.  No evidence of acute proximal/large vessel occlusion.  However, further evaluation with MRI is suggested  for correlation and to exclude acute infarct.    Generalized cerebral volume loss and chronic microvascular ischemic changes.     Cardiac Imaging:  · Normal left ventricular systolic function. The estimated ejection fraction is 55%  · Normal LV diastolic function.  · Normal right ventricular systolic function.  · Normal central venous pressure (3 mm Hg).      CT head 7/28/19  Acute left anterior temporal zone of infarction again noted without hemorrhagic conversion.  Multiple remote areas of infarction and encephalomalacia, as above.  Left ethmoid sinus disease with suspicion for fungal colonization.

## 2019-08-04 NOTE — ASSESSMENT & PLAN NOTE
63 y/o male with PMHx of alcohol and cocaine abuse L MCA syndrome received IV TPA at OSH and transferred to [facility] for further care. CTA with no LVO. MRI with R MCA cortical infarct. TTE unremarkable, no LAE. Etiology currently ESUS vs drug abuse. Will need 30 day event monitor at discharge    Antithrombotics: ASA 81 mg daily    Statins: Lipitor 40 mg daily    Aggressive risk factor modification: HTN     Rehab efforts: The patient has been evaluated by a stroke team provider and the therapy needs have been fully considered based off the presenting complaints and exam findings. The following therapy evaluations are needed: PT evaluate and treat, OT evaluate and treat, SLP evaluate and treat, PM&R evaluate for appropriate placement - SNF in [facility]    Diagnostics ordered/pending: None     VTE prophylaxis: SCD's and heparin 5000 units sc Q8H     BP parameters: Goal -160

## 2019-08-04 NOTE — PROGRESS NOTES
[facility]  Vascular Neurology  Comprehensive Stroke Center  Progress Note    Assessment/Plan:     * Stroke due to embolism of left middle cerebral artery  63 y/o male with PMHx of alcohol and cocaine abuse L MCA syndrome received IV TPA at OSH and transferred to [facility] for further care. CTA with no LVO. MRI with R MCA cortical infarct. TTE unremarkable, no LAE. Etiology currently ESUS vs drug abuse. Will need 30 day event monitor at discharge    Antithrombotics: ASA 81 mg daily    Statins: Lipitor 40 mg daily    Aggressive risk factor modification: HTN     Rehab efforts: The patient has been evaluated by a stroke team provider and the therapy needs have been fully considered based off the presenting complaints and exam findings. The following therapy evaluations are needed: PT evaluate and treat, OT evaluate and treat, SLP evaluate and treat, PM&R evaluate for appropriate placement - SNF in [facility]    Diagnostics ordered/pending: None     VTE prophylaxis: SCD's and heparin 5000 units sc Q8H     BP parameters: Goal -160        Drug abuse  Per daughter, history of drug abuse   Cocaine - use with sister and ex wifes nephew   Daughter would like to remove him from this situation and have him closer to her in [place]   Patient without signs or symptoms of withdrawal  No behavioral problems this admission    Explained and counseled on cessation of drugs although may not be fully appreciated due to aphasia    Cytotoxic cerebral edema  Area of cytotoxic cerebral edema identified when reviewing brain imaging in the territory of the L middle cerebral artery. There is not mass effect associated with it. We will continue to monitor the patients clinical exam for any worsening of symptoms which may indicate expansion of the stroke or the area of the edema resulting in the clinical change. The pattern is suggestive of ESUS vs drug abuse etiology        Alcohol abuse  No signs of symptoms of  withdrawl  Continue folic acid and thiamine  Will need counseling on cessation if aphasia improves    Essential hypertension  Stroke risk factor  SBP <160  BP at goal without antihypertensives    Aphasia  Due to stroke  Aggressive therapy    Received intravenous tissue plasminogen activator (t-PA) in emergency department  Completed close monitoring in [facility] 24 hours post administration   No complications          7/29/2019- Patient more alert, mild aphasia and dysarthria, TTE normal LA, no wall motion abnormalities, continue ASA and statin, rehab placement  7/30/19 - discussed care with older daughter, cathi. Appears that patient is unable to safely return to prior home site due to drug abuse activity (cocaine- him and his family In the area). The daughter wishes to place him in a safer environment near her in [facility]  7/31/19 - no events, neurologically stable, working on placement vs 24 hour care  8/1 referrals sent to facilities, waiting acceptance  8/2/19- stable neuro exam. No events overnight. Education regarding drug abuse given and reinforced       STROKE DOCUMENTATION   Acute Stroke Times   Last Known Normal Date: 07/27/19  Last Known Normal Time: 2200  Symptom Onset Date: 07/27/19  Symptom Onset Time: 2200  Stroke Team Called Date: 07/27/19  Stroke Team Called Time: 2249  Stroke Team Arrival Date: 07/28/19  Stroke Team Arrival Time: 0145  CT Interpretation Time: 2251  Decision to Treat Time for Alteplase: 2320(bolus given)    NIH Scale:  1a. Level of Consciousness: 0-->Alert, keenly responsive  1b. LOC Questions: 2-->Answers neither question correctly  1c. LOC Commands: 2-->Performs neither task correctly  2. Best Gaze: 0-->Normal  3. Visual: 0-->No visual loss  4. Facial Palsy: 2-->Partial paralysis (total or near-total paralysis of lower face)  5a. Motor Arm, Left: 0-->No drift, limb holds 90 (or 45) degrees for full 10 secs  5b. Motor Arm, Right: 0-->No drift, limb holds 90 (or 45) degrees for  full 10 secs  6a. Motor Leg, Left: 0-->No drift, leg holds 30 degree position for full 5 secs  6b. Motor Leg, Right: 0-->No drift, leg holds 30 degree position for full 5 secs  7. Limb Ataxia: 0-->Absent  8. Sensory: 0-->Normal, no sensory loss  9. Best Language: 2-->Severe aphasia, all communication is through fragmentary expression, great need for inference, questioning, and guessing by the listener. Range of information that can be exchanged is limited, listener carries burden of. . . (see row details)  10. Dysarthria: 2-->Severe dysarthria, patients speech is so slurred as to be unintelligible in the absence of or out of proportion to any dysphasia, or is mute/anarthric  11. Extinction and Inattention (formerly Neglect): 0-->No abnormality  Total (NIH Stroke Scale): 10       Modified Alyssa Score: 0  [place] Coma Scale:    ABCD2 Score:    [id number]-DBP Score:   HAS -BLED Score:   ICH Score:   Hunt & Barksdale Classification:      Hemorrhagic change of an Ischemic Stroke: Does this patient have an ischemic stroke with hemorrhagic changes? No     Neurologic Chief Complaint: left MCA - M2 occlusion    Subjective:     Interval History: Patient is seen for follow-up neurological assessment and treatment recommendations:   No events overnight, unable to engaged with singing or counting     HPI, Past Medical, Family, and Social History remains the same as documented in the initial encounter.     Review of Systems   Constitutional: Negative for fever.   Respiratory: Negative for cough.    Gastrointestinal: Negative for vomiting.   Neurological: Positive for facial asymmetry, speech difficulty and weakness.   Psychiatric/Behavioral: Negative for agitation.     Scheduled Meds:   aspirin  81 mg Oral Daily    atorvastatin  40 mg Oral Daily    folic acid  1 mg Oral Daily    heparin (porcine)  5,000 Units Subcutaneous Q8H    senna-docusate 8.6-50 mg  1 tablet Oral BID    thiamine  100 mg Oral Daily     Continuous  "Infusions:  PRN Meds:acetaminophen, hydrALAZINE, ondansetron, sodium chloride 0.9%    Objective:     Vital Signs (Most Recent):  Temp: 96.8 °F (36 °C) (08/04/19 1200)  Pulse: 62 (08/04/19 1200)  Resp: 18 (08/04/19 1200)  BP: 116/66 (08/04/19 1200)  SpO2: 100 % (08/04/19 1200)  BP Location: Right arm    Vital Signs Range (Last 24H):  Temp:  [96.7 °F (35.9 °C)-98.7 °F (37.1 °C)]   Pulse:  [58-81]   Resp:  [16-18]   BP: (107-127)/(59-75)   SpO2:  [93 %-100 %]   BP Location: Right arm    Physical Exam   Constitutional: He appears well-developed and well-nourished. No distress.   HENT:   Head: Normocephalic and atraumatic.   Eyes: Pupils are equal, round, and reactive to light.   Cardiovascular: Normal rate.   Pulmonary/Chest: Effort normal. No respiratory distress.   Neurological: He is alert.   Skin: Skin is warm and dry.   Vitals reviewed.      Neurological Exam:   LOC: alert  Attention Span: poor  Language: global aphasia, says "I don't know" when asked to follow commands. Not able to engage with singing or counting   Articulation: Dysarthria  Orientation: Not oriented to place, and time  EOM (CN III, IV, VI): Full/intact  Pupils (CN II, III): PERRL  Facial Sensation (CN V): Normal  Facial Movement (CN VII): Lower facial weakness on the right  Motor: Arm left  Normal 5/5  Leg left  Normal 5/5  Arm right  Paresis: 4+/5  Leg right Paresis: 4+/5  Tone: Normal tone throughout    Laboratory:  CMP:   Recent Labs   Lab 08/04/19  0359   CALCIUM 9.1   ALBUMIN 2.6*   PROT 6.1      K 3.9   CO2 25      BUN 14   CREATININE 0.8   ALKPHOS 96   ALT 12   AST 17   BILITOT 0.4     CBC:   Recent Labs   Lab 08/04/19  0359   WBC 5.85   RBC 3.39*   HGB 9.6*   HCT 30.8*   *   MCV 91   MCH 28.3   MCHC 31.2*     Lipid Panel:   No results for input(s): CHOL, LDLCALC, HDL, TRIG in the last 168 hours.  Coagulation: No results for input(s): PT, INR, APTT in the last 168 hours.  Platelet Aggregation Study: No results for " input(s): PLTAGG, PLTAGINTERP, PLTAGREGLACO, ADPPLTAGGREG in the last 168 hours.  Hgb A1C:   No results for input(s): HGBA1C in the last 168 hours.  TSH:   No results for input(s): TSH in the last 168 hours.    Diagnostic Results     Brain Imaging   MRI Brain acute interventional protocol 7-28-19 results:      Acute infarct within the left postcentral gyrus of the parietal lobe.  No proximal arterial occlusion demonstrated on MRA.    Remote infarcts within the bilateral precentral gyri and left middle frontal gyrus, consistent with remote bilateral MCA and left TJ infarcts.    Vessel Imaging:  CTA head and neck multiphase 7-28-19 results:  Bilateral frontal lobe encephalomalacia, likely secondary to remote left TJ and bilateral anterior MCA distribution infarcts.    Multifocal areas of irregularity and narrowing involving the intracranial arteries, specifically the left TJ and bilateral M2 branches which may be related to remote insults/atherosclerosis.  No evidence of acute proximal/large vessel occlusion.  However, further evaluation with MRI is suggested for correlation and to exclude acute infarct.    Generalized cerebral volume loss and chronic microvascular ischemic changes.     Cardiac Imaging:  · Normal left ventricular systolic function. The estimated ejection fraction is 55%  · Normal LV diastolic function.  · Normal right ventricular systolic function.  · Normal central venous pressure (3 mm Hg).      CT head 7/28/19  Acute left anterior temporal zone of infarction again noted without hemorrhagic conversion.  Multiple remote areas of infarction and encephalomalacia, as above.  Left ethmoid sinus disease with suspicion for fungal colonization.           BRANDI Jarrett  Comprehensive Stroke Center  Department of Vascular Neurology   [facility]

## 2019-08-04 NOTE — PLAN OF CARE
Problem: Adult Inpatient Plan of Care  Goal: Plan of Care Review  Outcome: Ongoing (interventions implemented as appropriate)  POC reviewed with patient. No evidence of learning. Requires reinforcement d/t expressive aphasia. VSS throughout shift. Fall/safety precautions implemented and maintained. Blood glucose monitored. Bed locked in lowest position. Call bell within reach. Will continue to monitor.

## 2019-08-05 LAB
ALBUMIN SERPL BCP-MCNC: 2.5 G/DL (ref 3.5–5.2)
ALP SERPL-CCNC: 85 U/L (ref 55–135)
ALT SERPL W/O P-5'-P-CCNC: 13 U/L (ref 10–44)
ANION GAP SERPL CALC-SCNC: 11 MMOL/L (ref 8–16)
AST SERPL-CCNC: 18 U/L (ref 10–40)
BASOPHILS # BLD AUTO: 0.03 K/UL (ref 0–0.2)
BASOPHILS NFR BLD: 0.5 % (ref 0–1.9)
BILIRUB SERPL-MCNC: 0.4 MG/DL (ref 0.1–1)
BUN SERPL-MCNC: 13 MG/DL (ref 8–23)
CALCIUM SERPL-MCNC: 9 MG/DL (ref 8.7–10.5)
CHLORIDE SERPL-SCNC: 106 MMOL/L (ref 95–110)
CO2 SERPL-SCNC: 23 MMOL/L (ref 23–29)
CREAT SERPL-MCNC: 0.8 MG/DL (ref 0.5–1.4)
DIFFERENTIAL METHOD: ABNORMAL
EOSINOPHIL # BLD AUTO: 0.4 K/UL (ref 0–0.5)
EOSINOPHIL NFR BLD: 6.2 % (ref 0–8)
ERYTHROCYTE [DISTWIDTH] IN BLOOD BY AUTOMATED COUNT: 13.2 % (ref 11.5–14.5)
EST. GFR  (AFRICAN AMERICAN): >60 ML/MIN/1.73 M^2
EST. GFR  (NON AFRICAN AMERICAN): >60 ML/MIN/1.73 M^2
GLUCOSE SERPL-MCNC: 80 MG/DL (ref 70–110)
HCT VFR BLD AUTO: 30.9 % (ref 40–54)
HGB BLD-MCNC: 9.4 G/DL (ref 14–18)
IMM GRANULOCYTES # BLD AUTO: 0.04 K/UL (ref 0–0.04)
IMM GRANULOCYTES NFR BLD AUTO: 0.7 % (ref 0–0.5)
LYMPHOCYTES # BLD AUTO: 2.4 K/UL (ref 1–4.8)
LYMPHOCYTES NFR BLD: 40.5 % (ref 18–48)
MAGNESIUM SERPL-MCNC: 1.8 MG/DL (ref 1.6–2.6)
MCH RBC QN AUTO: 28.3 PG (ref 27–31)
MCHC RBC AUTO-ENTMCNC: 30.4 G/DL (ref 32–36)
MCV RBC AUTO: 93 FL (ref 82–98)
MONOCYTES # BLD AUTO: 0.5 K/UL (ref 0.3–1)
MONOCYTES NFR BLD: 9.1 % (ref 4–15)
NEUTROPHILS # BLD AUTO: 2.5 K/UL (ref 1.8–7.7)
NEUTROPHILS NFR BLD: 43 % (ref 38–73)
NRBC BLD-RTO: 0 /100 WBC
PHOSPHATE SERPL-MCNC: 3.5 MG/DL (ref 2.7–4.5)
PLATELET # BLD AUTO: 359 K/UL (ref 150–350)
PMV BLD AUTO: 10.3 FL (ref 9.2–12.9)
POCT GLUCOSE: 139 MG/DL (ref 70–110)
POCT GLUCOSE: 189 MG/DL (ref 70–110)
POCT GLUCOSE: 93 MG/DL (ref 70–110)
POTASSIUM SERPL-SCNC: 4.1 MMOL/L (ref 3.5–5.1)
PROT SERPL-MCNC: 5.9 G/DL (ref 6–8.4)
RBC # BLD AUTO: 3.32 M/UL (ref 4.6–6.2)
SODIUM SERPL-SCNC: 140 MMOL/L (ref 136–145)
WBC # BLD AUTO: 5.8 K/UL (ref 3.9–12.7)

## 2019-08-05 PROCEDURE — 99233 SBSQ HOSP IP/OBS HIGH 50: CPT | Mod: ,,, | Performed by: PSYCHIATRY & NEUROLOGY

## 2019-08-05 PROCEDURE — 20600001 HC STEP DOWN PRIVATE ROOM

## 2019-08-05 PROCEDURE — 36415 COLL VENOUS BLD VENIPUNCTURE: CPT

## 2019-08-05 PROCEDURE — 92507 TX SP LANG VOICE COMM INDIV: CPT

## 2019-08-05 PROCEDURE — 63600175 PHARM REV CODE 636 W HCPCS: Performed by: PHYSICIAN ASSISTANT

## 2019-08-05 PROCEDURE — 80053 COMPREHEN METABOLIC PANEL: CPT

## 2019-08-05 PROCEDURE — 85025 COMPLETE CBC W/AUTO DIFF WBC: CPT

## 2019-08-05 PROCEDURE — 99233 PR SUBSEQUENT HOSPITAL CARE,LEVL III: ICD-10-PCS | Mod: ,,, | Performed by: PSYCHIATRY & NEUROLOGY

## 2019-08-05 PROCEDURE — 94761 N-INVAS EAR/PLS OXIMETRY MLT: CPT

## 2019-08-05 PROCEDURE — 25000003 PHARM REV CODE 250: Performed by: PHYSICIAN ASSISTANT

## 2019-08-05 PROCEDURE — 84100 ASSAY OF PHOSPHORUS: CPT

## 2019-08-05 PROCEDURE — 83735 ASSAY OF MAGNESIUM: CPT

## 2019-08-05 RX ADMIN — ATORVASTATIN CALCIUM 40 MG: 20 TABLET, FILM COATED ORAL at 08:08

## 2019-08-05 RX ADMIN — Medication 100 MG: at 08:08

## 2019-08-05 RX ADMIN — ASPIRIN 81 MG CHEWABLE TABLET 81 MG: 81 TABLET CHEWABLE at 08:08

## 2019-08-05 RX ADMIN — HEPARIN SODIUM 5000 UNITS: 5000 INJECTION, SOLUTION INTRAVENOUS; SUBCUTANEOUS at 09:08

## 2019-08-05 RX ADMIN — SENNOSIDES,DOCUSATE SODIUM 1 TABLET: 8.6; 5 TABLET, FILM COATED ORAL at 08:08

## 2019-08-05 RX ADMIN — HEPARIN SODIUM 5000 UNITS: 5000 INJECTION, SOLUTION INTRAVENOUS; SUBCUTANEOUS at 05:08

## 2019-08-05 RX ADMIN — HEPARIN SODIUM 5000 UNITS: 5000 INJECTION, SOLUTION INTRAVENOUS; SUBCUTANEOUS at 01:08

## 2019-08-05 RX ADMIN — FOLIC ACID 1 MG: 1 TABLET ORAL at 08:08

## 2019-08-05 RX ADMIN — SENNOSIDES,DOCUSATE SODIUM 1 TABLET: 8.6; 5 TABLET, FILM COATED ORAL at 09:08

## 2019-08-05 NOTE — SUBJECTIVE & OBJECTIVE
"Neurologic Chief Complaint: left MCA - M2 occlusion    Subjective:     Interval History: Patient is seen for follow-up neurological assessment and treatment recommendations:   No events overnight, unable to engaged with singing or counting     HPI, Past Medical, Family, and Social History remains the same as documented in the initial encounter.     Review of Systems   Constitutional: Negative for fever.   Respiratory: Negative for cough.    Gastrointestinal: Negative for vomiting.   Neurological: Positive for facial asymmetry, speech difficulty and weakness.   Psychiatric/Behavioral: Negative for agitation.     Scheduled Meds:   aspirin  81 mg Oral Daily    atorvastatin  40 mg Oral Daily    folic acid  1 mg Oral Daily    heparin (porcine)  5,000 Units Subcutaneous Q8H    senna-docusate 8.6-50 mg  1 tablet Oral BID    thiamine  100 mg Oral Daily     Continuous Infusions:  PRN Meds:acetaminophen, hydrALAZINE, ondansetron, sodium chloride 0.9%    Objective:     Vital Signs (Most Recent):  Temp: 97.1 °F (36.2 °C) (08/05/19 1200)  Pulse: 60 (08/05/19 1536)  Resp: 18 (08/05/19 1200)  BP: 109/61 (08/05/19 1200)  SpO2: 99 % (08/05/19 1200)  BP Location: Right arm    Vital Signs Range (Last 24H):  Temp:  [96.1 °F (35.6 °C)-97.7 °F (36.5 °C)]   Pulse:  [52-77]   Resp:  [12-18]   BP: (109-145)/(61-77)   SpO2:  [95 %-99 %]   BP Location: Right arm    Physical Exam   Constitutional: He appears well-developed and well-nourished. No distress.   HENT:   Head: Normocephalic and atraumatic.   Eyes: Pupils are equal, round, and reactive to light.   Cardiovascular: Normal rate.   Pulmonary/Chest: Effort normal. No respiratory distress.   Neurological: He is alert.   Skin: Skin is warm and dry.   Vitals reviewed.      Neurological Exam:   LOC: alert  Attention Span: poor  Language: global aphasia, says "I don't know" when asked to follow commands. Not able to engage with singing or counting   Articulation: Dysarthria  Orientation: " Not oriented to place, and time  EOM (CN III, IV, VI): Full/intact  Pupils (CN II, III): PERRL  Facial Sensation (CN V): Normal  Facial Movement (CN VII): Lower facial weakness on the right  Motor: Arm left  Normal 5/5  Leg left  Normal 5/5  Arm right  Paresis: 4+/5  Leg right Paresis: 4+/5  Tone: Normal tone throughout    Laboratory:  CMP:   Recent Labs   Lab 08/05/19  0413   CALCIUM 9.0   ALBUMIN 2.5*   PROT 5.9*      K 4.1   CO2 23      BUN 13   CREATININE 0.8   ALKPHOS 85   ALT 13   AST 18   BILITOT 0.4     CBC:   Recent Labs   Lab 08/05/19  0413   WBC 5.80   RBC 3.32*   HGB 9.4*   HCT 30.9*   *   MCV 93   MCH 28.3   MCHC 30.4*     Lipid Panel:   No results for input(s): CHOL, LDLCALC, HDL, TRIG in the last 168 hours.  Coagulation: No results for input(s): PT, INR, APTT in the last 168 hours.  Platelet Aggregation Study: No results for input(s): PLTAGG, PLTAGINTERP, PLTAGREGLACO, ADPPLTAGGREG in the last 168 hours.  Hgb A1C:   No results for input(s): HGBA1C in the last 168 hours.  TSH:   No results for input(s): TSH in the last 168 hours.    Diagnostic Results     Brain Imaging   MRI Brain acute interventional protocol 7-28-19 results:      Acute infarct within the left postcentral gyrus of the parietal lobe.  No proximal arterial occlusion demonstrated on MRA.    Remote infarcts within the bilateral precentral gyri and left middle frontal gyrus, consistent with remote bilateral MCA and left TJ infarcts.    Vessel Imaging:  CTA head and neck multiphase 7-28-19 results:  Bilateral frontal lobe encephalomalacia, likely secondary to remote left TJ and bilateral anterior MCA distribution infarcts.    Multifocal areas of irregularity and narrowing involving the intracranial arteries, specifically the left TJ and bilateral M2 branches which may be related to remote insults/atherosclerosis.  No evidence of acute proximal/large vessel occlusion.  However, further evaluation with MRI is suggested  for correlation and to exclude acute infarct.    Generalized cerebral volume loss and chronic microvascular ischemic changes.     Cardiac Imaging:  · Normal left ventricular systolic function. The estimated ejection fraction is 55%  · Normal LV diastolic function.  · Normal right ventricular systolic function.  · Normal central venous pressure (3 mm Hg).      CT head 7/28/19  Acute left anterior temporal zone of infarction again noted without hemorrhagic conversion.  Multiple remote areas of infarction and encephalomalacia, as above.  Left ethmoid sinus disease with suspicion for fungal colonization.

## 2019-08-05 NOTE — PLAN OF CARE
Problem: SLP Goal  Goal: SLP Goal  Speech Language Pathology Goals  Goals expected to be met by 8/12:   1. Pt tolerate soft diet with thin liquids and no overt s/s of aspiration.  2. Pt will answer simple y/n questions with 60% accy, via any modality, with mod cues.  3. Pt will model simple commands with 40% accy given max cues.  4. Pt will elicit intelligible words x5 during auto speech tasks, given max cues.   5. Pt will id objects in field of 2 with 60% accy.    Speech Language Pathology Goals  Goals expected to be met by 8/5:   1. Pt will participate in ongoing assessment of swallow. - goal met.   2. Pt will answer simple y/n questions with 70% accy with min cues. - goal not met.   3. Pt will follow simple commands with 60% accy given max cues. - goal not met.  4. Pt will complete auto speech tasks with 50% accy given max cues. - goal not met.  5. Pt will id objects in field of 2 with 60% accy.  - goal not met.                Continue ST with the above plan of care.     JOSE LUIS Coates, CCC-SLP  Speech Language Pathologist  [phone]  8/5/2019

## 2019-08-05 NOTE — ASSESSMENT & PLAN NOTE
65 y/o male with PMHx of alcohol and cocaine abuse L MCA syndrome received IV TPA at OSH and transferred to [facility] for further care. CTA with no LVO. MRI with R MCA cortical infarct. TTE unremarkable, no LAE. Etiology currently ESUS vs drug abuse. Will need 30 day event monitor at discharge    Antithrombotics: ASA 81 mg daily    Statins: Lipitor 40 mg daily    Aggressive risk factor modification: HTN     Rehab efforts: The patient has been evaluated by a stroke team provider and the therapy needs have been fully considered based off the presenting complaints and exam findings. The following therapy evaluations are needed: PT evaluate and treat, OT evaluate and treat, SLP evaluate and treat, PM&R evaluate for appropriate placement - SNF in [facility]    Diagnostics ordered/pending: None     VTE prophylaxis: SCD's and heparin 5000 units sc Q8H     BP parameters: Goal -160

## 2019-08-05 NOTE — PT/OT/SLP PROGRESS
"Speech Language Pathology Treatment    Patient Name:  Ayden Kincaid   MRN:  [id number]  Admitting Diagnosis: Stroke due to embolism of left middle cerebral artery    Recommendations:           General Recommendations:  Dysphagia therapy, Speech/language therapy and Cognitive-linguistic therapy  Diet recommendations:  Dental Soft, Liquid Diet Level: Thin   Aspiration Precautions: Strict aspiration precautions   General Precautions: Standard, aphasia, aspiration, fall  Communication strategies:  provide increased time to answer and go to room if call light pushed      Subjective     "no" (pt elicited during simple y/n question)    Pain/Comfort:  · Pain Rating 1: 0/10  · Pain Rating Post-Intervention 1: 0/10    Objective:     Has the patient been evaluated by SLP for swallowing?   Yes  Keep patient NPO? No   Current Respiratory Status: room air      Pt seen this am with no family present.  He was asleep upon entry, but arose to participate.  Language deficits remain evident. Pt did not model or follow any simple commands.  He responded to simple y/n questions appropriately on 40% of trials.  Responses are difficult to differentiate at times.  Responses to functional y/n questions in context appear more appropriate vs out of context questions. Pt did not initiate pointing to identify common objects in a fo2.  Expressively, pt counted numbers "one" and "two" clearly. He made single word response to automatic phrases; however, responses were jargon/unintelligible. Pt did not name presented common objects or repeat names/words.   Occasional spontaneous intelligible utterances are noted (I.e. "I don't", "yea", "ok").  Pt did not wish for po trials.  Will continue to monitor diet tolerance. Education provided regarding ST role, aphasia, language stimulation and poc.     Assessment:     Ayden Kincaid is a 64 y.o. male with an SLP diagnosis of Aphasia, Dysphagia and Dysarthria.      Goals:   Multidisciplinary Problems     SLP " Goals        Problem: SLP Goal    Goal Priority Disciplines Outcome   SLP Goal     SLP Ongoing (interventions implemented as appropriate)   Description:  Speech Language Pathology Goals  Goals expected to be met by 8/12:   1. Pt tolerate soft diet with thin liquids and no overt s/s of aspiration.  2. Pt will answer simple y/n questions with 60% accy, via any modality, with mod cues.  3. Pt will model simple commands with 40% accy given max cues.  4. Pt will elicit intelligible words x5 during auto speech tasks, given max cues.   5. Pt will id objects in field of 2 with 60% accy.    Speech Language Pathology Goals  Goals expected to be met by 8/5:   1. Pt will participate in ongoing assessment of swallow. - goal met.   2. Pt will answer simple y/n questions with 70% accy with min cues. - goal not met.   3. Pt will follow simple commands with 60% accy given max cues. - goal not met.  4. Pt will complete auto speech tasks with 50% accy given max cues. - goal not met.  5. Pt will id objects in field of 2 with 60% accy.  - goal not met.                              Plan:     · Patient to be seen:  4 x/week   · Plan of Care expires:  08/28/19  · Plan of Care reviewed with:  patient   · SLP Follow-Up:  Yes       Discharge recommendations:  nursing facility, skilled   Time Tracking:     SLP Treatment Date:   08/05/19  Speech Start Time:  1136  Speech Stop Time:  1145     Speech Total Time (min):  9 min    Billable Minutes: Speech Therapy Individual 9    JOSE LUIS Coates, CCC-SLP  Speech Language Pathologist  [phone]  8/5/2019

## 2019-08-05 NOTE — PROGRESS NOTES
[facility]  Vascular Neurology  Comprehensive Stroke Center  Progress Note    Assessment/Plan:     * Stroke due to embolism of left middle cerebral artery  65 y/o male with PMHx of alcohol and cocaine abuse L MCA syndrome received IV TPA at [facility] and transferred to [facility] for further care. CTA with no LVO. MRI with R MCA cortical infarct. TTE unremarkable, no LAE. Etiology currently ESUS vs drug abuse. Will need 30 day event monitor at discharge    Antithrombotics: ASA 81 mg daily    Statins: Lipitor 40 mg daily    Aggressive risk factor modification: HTN     Rehab efforts: The patient has been evaluated by a stroke team provider and the therapy needs have been fully considered based off the presenting complaints and exam findings. The following therapy evaluations are needed: PT evaluate and treat, OT evaluate and treat, SLP evaluate and treat, PM&R evaluate for appropriate placement - SNF in [facility] [place]    Diagnostics ordered/pending: None     VTE prophylaxis: SCD's and heparin 5000 units sc Q8H     BP parameters: Goal -160        Drug abuse  Per daughter, history of drug abuse   Cocaine - use with sister and ex wifes nephew   Daughter would like to remove him from this situation and have him closer to her in [place]   Patient without signs or symptoms of withdrawal  No behavioral problems this admission    Explained and counseled on cessation of drugs although may not be fully appreciated due to aphasia    Cytotoxic cerebral edema  Area of cytotoxic cerebral edema identified when reviewing brain imaging in the territory of the L middle cerebral artery. There is not mass effect associated with it. We will continue to monitor the patients clinical exam for any worsening of symptoms which may indicate expansion of the stroke or the area of the edema resulting in the clinical change. The pattern is suggestive of ESUS vs drug abuse etiology        Alcohol abuse  No signs of symptoms of  withdrawl  Continue folic acid and thiamine  Will need counseling on cessation if aphasia improves    Essential hypertension  Stroke risk factor  SBP <160  BP at goal without antihypertensives    Aphasia  Due to stroke  Aggressive therapy    Received intravenous tissue plasminogen activator (t-PA) in emergency department  Completed close monitoring in [facility] 24 hours post administration   No complications          7/29/2019- Patient more alert, mild aphasia and dysarthria, TTE normal LA, no wall motion abnormalities, continue ASA and statin, rehab placement  7/30/19 - discussed care with older daughter, cathi. Appears that patient is unable to safely return to prior home site due to drug abuse activity (cocaine- him and his family In the area). The daughter wishes to place him in a safer environment near her in [facility]  7/31/19 - no events, neurologically stable, working on placement vs 24 hour care  8/1 referrals sent to facilities, waiting acceptance  8/2/19- stable neuro exam. No events overnight. Education regarding drug abuse given and reinforced   8/5/2019- stable neuro exam. No events overnight.       STROKE DOCUMENTATION   Acute Stroke Times   Last Known Normal Date: 07/27/19  Last Known Normal Time: 2200  Symptom Onset Date: 07/27/19  Symptom Onset Time: 2200  Stroke Team Called Date: 07/27/19  Stroke Team Called Time: 2249  Stroke Team Arrival Date: 07/28/19  Stroke Team Arrival Time: 0145  CT Interpretation Time: 2251  Decision to Treat Time for Alteplase: 2320(bolus given)    NIH Scale:  1a. Level of Consciousness: 0-->Alert, keenly responsive  1b. LOC Questions: 2-->Answers neither question correctly  1c. LOC Commands: 2-->Performs neither task correctly  2. Best Gaze: 0-->Normal  3. Visual: 0-->No visual loss  4. Facial Palsy: 1-->Minor paralysis (flattened nasolabial fold, asymmetry on smiling)  5a. Motor Arm, Left: 1-->Drift, limb holds 90 (or 45) degrees, but drifts down before full 10 seconds,  does not hit bed or other support  5b. Motor Arm, Right: 1-->Drift, limb holds 90 (or 45) degrees, but drifts down before full 10 secs, does not hit bed or other support  6a. Motor Leg, Left: 1-->Drift, leg falls by the end of the 5-sec period but does not hit bed  6b. Motor Leg, Right: 1-->Drift, leg falls by the end of the 5-sec period but does not hit bed  7. Limb Ataxia: 0-->Absent  8. Sensory: 0-->Normal, no sensory loss  9. Best Language: 2-->Severe aphasia, all communication is through fragmentary expression, great need for inference, questioning, and guessing by the listener. Range of information that can be exchanged is limited, listener carries burden of. . . (see row details)  10. Dysarthria: 2-->Severe dysarthria, patients speech is so slurred as to be unintelligible in the absence of or out of proportion to any dysphasia, or is mute/anarthric  11. Extinction and Inattention (formerly Neglect): 0-->No abnormality  Total (NIH Stroke Scale): 13       Modified Darlington Score: 0  Haywood Coma Scale:    ABCD2 Score:    [id number] Score:   HAS -BLED Score:   ICH Score:   Hunt & Barksdale Classification:      Hemorrhagic change of an Ischemic Stroke: Does this patient have an ischemic stroke with hemorrhagic changes? No     Neurologic Chief Complaint: left MCA - M2 occlusion    Subjective:     Interval History: Patient is seen for follow-up neurological assessment and treatment recommendations:   No events overnight, unable to engaged with singing or counting     HPI, Past Medical, Family, and Social History remains the same as documented in the initial encounter.     Review of Systems   Constitutional: Negative for fever.   Respiratory: Negative for cough.    Gastrointestinal: Negative for vomiting.   Neurological: Positive for facial asymmetry, speech difficulty and weakness.   Psychiatric/Behavioral: Negative for agitation.     Scheduled Meds:   aspirin  81 mg Oral Daily    atorvastatin  40 mg Oral Daily     "folic acid  1 mg Oral Daily    heparin (porcine)  5,000 Units Subcutaneous Q8H    senna-docusate 8.6-50 mg  1 tablet Oral BID    thiamine  100 mg Oral Daily     Continuous Infusions:  PRN Meds:acetaminophen, hydrALAZINE, ondansetron, sodium chloride 0.9%    Objective:     Vital Signs (Most Recent):  Temp: 97.1 °F (36.2 °C) (08/05/19 1200)  Pulse: 60 (08/05/19 1536)  Resp: 18 (08/05/19 1200)  BP: 109/61 (08/05/19 1200)  SpO2: 99 % (08/05/19 1200)  BP Location: Right arm    Vital Signs Range (Last 24H):  Temp:  [96.1 °F (35.6 °C)-97.7 °F (36.5 °C)]   Pulse:  [52-77]   Resp:  [12-18]   BP: (109-145)/(61-77)   SpO2:  [95 %-99 %]   BP Location: Right arm    Physical Exam   Constitutional: He appears well-developed and well-nourished. No distress.   HENT:   Head: Normocephalic and atraumatic.   Eyes: Pupils are equal, round, and reactive to light.   Cardiovascular: Normal rate.   Pulmonary/Chest: Effort normal. No respiratory distress.   Neurological: He is alert.   Skin: Skin is warm and dry.   Vitals reviewed.      Neurological Exam:   LOC: alert  Attention Span: poor  Language: global aphasia, says "I don't know" when asked to follow commands. Not able to engage with singing or counting   Articulation: Dysarthria  Orientation: Not oriented to place, and time  EOM (CN III, IV, VI): Full/intact  Pupils (CN II, III): PERRL  Facial Sensation (CN V): Normal  Facial Movement (CN VII): Lower facial weakness on the right  Motor: Arm left  Normal 5/5  Leg left  Normal 5/5  Arm right  Paresis: 4+/5  Leg right Paresis: 4+/5  Tone: Normal tone throughout    Laboratory:  CMP:   Recent Labs   Lab 08/05/19  0413   CALCIUM 9.0   ALBUMIN 2.5*   PROT 5.9*      K 4.1   CO2 23      BUN 13   CREATININE 0.8   ALKPHOS 85   ALT 13   AST 18   BILITOT 0.4     CBC:   Recent Labs   Lab 08/05/19  0413   WBC 5.80   RBC 3.32*   HGB 9.4*   HCT 30.9*   *   MCV 93   MCH 28.3   MCHC 30.4*     Lipid Panel:   No results for input(s): " CHOL, LDLCALC, HDL, TRIG in the last 168 hours.  Coagulation: No results for input(s): PT, INR, APTT in the last 168 hours.  Platelet Aggregation Study: No results for input(s): PLTAGG, PLTAGINTERP, PLTAGREGLACO, ADPPLTAGGREG in the last 168 hours.  Hgb A1C:   No results for input(s): HGBA1C in the last 168 hours.  TSH:   No results for input(s): TSH in the last 168 hours.    Diagnostic Results     Brain Imaging   MRI Brain acute interventional protocol 7-28-19 results:      Acute infarct within the left postcentral gyrus of the parietal lobe.  No proximal arterial occlusion demonstrated on MRA.    Remote infarcts within the bilateral precentral gyri and left middle frontal gyrus, consistent with remote bilateral MCA and left TJ infarcts.    Vessel Imaging:  CTA head and neck multiphase 7-28-19 results:  Bilateral frontal lobe encephalomalacia, likely secondary to remote left TJ and bilateral anterior MCA distribution infarcts.    Multifocal areas of irregularity and narrowing involving the intracranial arteries, specifically the left TJ and bilateral M2 branches which may be related to remote insults/atherosclerosis.  No evidence of acute proximal/large vessel occlusion.  However, further evaluation with MRI is suggested for correlation and to exclude acute infarct.    Generalized cerebral volume loss and chronic microvascular ischemic changes.     Cardiac Imaging:  · Normal left ventricular systolic function. The estimated ejection fraction is 55%  · Normal LV diastolic function.  · Normal right ventricular systolic function.  · Normal central venous pressure (3 mm Hg).      CT head 7/28/19  Acute left anterior temporal zone of infarction again noted without hemorrhagic conversion.  Multiple remote areas of infarction and encephalomalacia, as above.  Left ethmoid sinus disease with suspicion for fungal colonization.           Colin Jimenes MD  Comprehensive Stroke Center  Department of Vascular Neurology    [facility]

## 2019-08-05 NOTE — PLAN OF CARE
Problem: Adult Inpatient Plan of Care  Goal: Plan of Care Review  Outcome: Ongoing (interventions implemented as appropriate)  POC reviewed with patient. Patient requires reinforcement. Expressive aphasia noted. VSS throughout shift. Fall/safety precautions implemented and maintained. Blood glucose monitored. Bed locked in lowest position. Call bell within reach. Will continue to monitor.

## 2019-08-06 LAB
ALBUMIN SERPL BCP-MCNC: 2.7 G/DL (ref 3.5–5.2)
ALP SERPL-CCNC: 86 U/L (ref 55–135)
ALT SERPL W/O P-5'-P-CCNC: 15 U/L (ref 10–44)
ANION GAP SERPL CALC-SCNC: 8 MMOL/L (ref 8–16)
AST SERPL-CCNC: 18 U/L (ref 10–40)
BASOPHILS # BLD AUTO: 0.03 K/UL (ref 0–0.2)
BASOPHILS NFR BLD: 0.5 % (ref 0–1.9)
BILIRUB SERPL-MCNC: 0.5 MG/DL (ref 0.1–1)
BUN SERPL-MCNC: 12 MG/DL (ref 8–23)
CALCIUM SERPL-MCNC: 8.8 MG/DL (ref 8.7–10.5)
CHLORIDE SERPL-SCNC: 105 MMOL/L (ref 95–110)
CO2 SERPL-SCNC: 26 MMOL/L (ref 23–29)
CREAT SERPL-MCNC: 0.8 MG/DL (ref 0.5–1.4)
DIFFERENTIAL METHOD: ABNORMAL
EOSINOPHIL # BLD AUTO: 0.3 K/UL (ref 0–0.5)
EOSINOPHIL NFR BLD: 5.7 % (ref 0–8)
ERYTHROCYTE [DISTWIDTH] IN BLOOD BY AUTOMATED COUNT: 13.3 % (ref 11.5–14.5)
EST. GFR  (AFRICAN AMERICAN): >60 ML/MIN/1.73 M^2
EST. GFR  (NON AFRICAN AMERICAN): >60 ML/MIN/1.73 M^2
GLUCOSE SERPL-MCNC: 80 MG/DL (ref 70–110)
HCT VFR BLD AUTO: 30.4 % (ref 40–54)
HGB BLD-MCNC: 9.5 G/DL (ref 14–18)
IMM GRANULOCYTES # BLD AUTO: 0.04 K/UL (ref 0–0.04)
IMM GRANULOCYTES NFR BLD AUTO: 0.7 % (ref 0–0.5)
LYMPHOCYTES # BLD AUTO: 2.1 K/UL (ref 1–4.8)
LYMPHOCYTES NFR BLD: 38.5 % (ref 18–48)
MAGNESIUM SERPL-MCNC: 1.9 MG/DL (ref 1.6–2.6)
MCH RBC QN AUTO: 28.7 PG (ref 27–31)
MCHC RBC AUTO-ENTMCNC: 31.3 G/DL (ref 32–36)
MCV RBC AUTO: 92 FL (ref 82–98)
MONOCYTES # BLD AUTO: 0.6 K/UL (ref 0.3–1)
MONOCYTES NFR BLD: 10 % (ref 4–15)
NEUTROPHILS # BLD AUTO: 2.4 K/UL (ref 1.8–7.7)
NEUTROPHILS NFR BLD: 44.6 % (ref 38–73)
NRBC BLD-RTO: 0 /100 WBC
PHOSPHATE SERPL-MCNC: 3.5 MG/DL (ref 2.7–4.5)
PLATELET # BLD AUTO: 352 K/UL (ref 150–350)
PMV BLD AUTO: 10.6 FL (ref 9.2–12.9)
POCT GLUCOSE: 154 MG/DL (ref 70–110)
POCT GLUCOSE: 161 MG/DL (ref 70–110)
POCT GLUCOSE: 182 MG/DL (ref 70–110)
POCT GLUCOSE: 199 MG/DL (ref 70–110)
POTASSIUM SERPL-SCNC: 4.2 MMOL/L (ref 3.5–5.1)
PROT SERPL-MCNC: 6.1 G/DL (ref 6–8.4)
RBC # BLD AUTO: 3.31 M/UL (ref 4.6–6.2)
SODIUM SERPL-SCNC: 139 MMOL/L (ref 136–145)
WBC # BLD AUTO: 5.48 K/UL (ref 3.9–12.7)

## 2019-08-06 PROCEDURE — 92507 TX SP LANG VOICE COMM INDIV: CPT

## 2019-08-06 PROCEDURE — 83735 ASSAY OF MAGNESIUM: CPT

## 2019-08-06 PROCEDURE — 25000003 PHARM REV CODE 250: Performed by: PHYSICIAN ASSISTANT

## 2019-08-06 PROCEDURE — 20600001 HC STEP DOWN PRIVATE ROOM

## 2019-08-06 PROCEDURE — 85025 COMPLETE CBC W/AUTO DIFF WBC: CPT

## 2019-08-06 PROCEDURE — 99233 SBSQ HOSP IP/OBS HIGH 50: CPT | Mod: ,,, | Performed by: PSYCHIATRY & NEUROLOGY

## 2019-08-06 PROCEDURE — 99233 PR SUBSEQUENT HOSPITAL CARE,LEVL III: ICD-10-PCS | Mod: ,,, | Performed by: PSYCHIATRY & NEUROLOGY

## 2019-08-06 PROCEDURE — 80053 COMPREHEN METABOLIC PANEL: CPT

## 2019-08-06 PROCEDURE — 84100 ASSAY OF PHOSPHORUS: CPT

## 2019-08-06 PROCEDURE — 63600175 PHARM REV CODE 636 W HCPCS: Performed by: PHYSICIAN ASSISTANT

## 2019-08-06 PROCEDURE — 36415 COLL VENOUS BLD VENIPUNCTURE: CPT

## 2019-08-06 RX ADMIN — ASPIRIN 81 MG CHEWABLE TABLET 81 MG: 81 TABLET CHEWABLE at 09:08

## 2019-08-06 RX ADMIN — HEPARIN SODIUM 5000 UNITS: 5000 INJECTION, SOLUTION INTRAVENOUS; SUBCUTANEOUS at 02:08

## 2019-08-06 RX ADMIN — HEPARIN SODIUM 5000 UNITS: 5000 INJECTION, SOLUTION INTRAVENOUS; SUBCUTANEOUS at 05:08

## 2019-08-06 RX ADMIN — FOLIC ACID 1 MG: 1 TABLET ORAL at 09:08

## 2019-08-06 RX ADMIN — SENNOSIDES,DOCUSATE SODIUM 1 TABLET: 8.6; 5 TABLET, FILM COATED ORAL at 08:08

## 2019-08-06 RX ADMIN — Medication 100 MG: at 09:08

## 2019-08-06 RX ADMIN — HEPARIN SODIUM 5000 UNITS: 5000 INJECTION, SOLUTION INTRAVENOUS; SUBCUTANEOUS at 09:08

## 2019-08-06 RX ADMIN — ATORVASTATIN CALCIUM 40 MG: 20 TABLET, FILM COATED ORAL at 09:08

## 2019-08-06 RX ADMIN — SENNOSIDES,DOCUSATE SODIUM 1 TABLET: 8.6; 5 TABLET, FILM COATED ORAL at 09:08

## 2019-08-06 NOTE — PT/OT/SLP PROGRESS
Occupational Therapy      Patient Name:  Ayden Kincaid   MRN:  [id number]    Patient not seen today secondary to Patient unwilling to participate, pt stated that he wanted to stay in bed. Pt in no pain/discomfort. Will follow-up 08/07/19.    Ele Cruz, OT  8/6/2019

## 2019-08-06 NOTE — PLAN OF CARE
08/06/19 1457   Discharge Reassessment   Assessment Type Discharge Planning Reassessment   Discharge Plan A Skilled Nursing Facility   Discharge Plan B Rehab   Anticipated Discharge Disposition SNF

## 2019-08-06 NOTE — SUBJECTIVE & OBJECTIVE
"Neurologic Chief Complaint: left MCA - M2 occlusion    Subjective:     Interval History: Patient is seen for follow-up neurological assessment and treatment recommendations: No events overnight. Will speak with family regarding placement.    HPI, Past Medical, Family, and Social History remains the same as documented in the initial encounter.     Review of Systems   Constitutional: Negative for fever.   Respiratory: Negative for cough.    Gastrointestinal: Negative for vomiting.   Neurological: Positive for facial asymmetry, speech difficulty and weakness.   Psychiatric/Behavioral: Negative for agitation.     Scheduled Meds:   aspirin  81 mg Oral Daily    atorvastatin  40 mg Oral Daily    folic acid  1 mg Oral Daily    heparin (porcine)  5,000 Units Subcutaneous Q8H    senna-docusate 8.6-50 mg  1 tablet Oral BID    thiamine  100 mg Oral Daily     Continuous Infusions:  PRN Meds:acetaminophen, hydrALAZINE, ondansetron, sodium chloride 0.9%    Objective:     Vital Signs (Most Recent):  Temp: 97.5 °F (36.4 °C) (08/06/19 1244)  Pulse: 67 (08/06/19 1244)  Resp: 18 (08/06/19 1244)  BP: 108/69 (08/06/19 1244)  SpO2: 96 % (08/06/19 1244)  BP Location: Right arm    Vital Signs Range (Last 24H):  Temp:  [96.7 °F (35.9 °C)-98.4 °F (36.9 °C)]   Pulse:  [53-81]   Resp:  [16-18]   BP: (108-129)/(68-76)   SpO2:  [96 %-100 %]   BP Location: Right arm    Physical Exam   Constitutional: He appears well-developed and well-nourished. No distress.   HENT:   Head: Normocephalic and atraumatic.   Eyes: Pupils are equal, round, and reactive to light.   Cardiovascular: Normal rate.   Pulmonary/Chest: Effort normal. No respiratory distress.   Neurological: He is alert.   Skin: Skin is warm and dry.   Vitals reviewed.      Neurological Exam:   LOC: alert  Attention Span: poor  Language: global aphasia, says "I don't know" when asked to follow commands. Not able to engage with singing or counting   Articulation: Dysarthria  Orientation: " Not oriented to place, and time  EOM (CN III, IV, VI): Full/intact  Pupils (CN II, III): PERRL  Facial Sensation (CN V): Normal  Facial Movement (CN VII): Lower facial weakness on the right  Motor: Arm left  Normal 5/5  Leg left  Normal 5/5  Arm right  Paresis: 4+/5  Leg right Paresis: 4+/5  Tone: Normal tone throughout    Laboratory:  CMP:   Recent Labs   Lab 08/06/19  0634   CALCIUM 8.8   ALBUMIN 2.7*   PROT 6.1      K 4.2   CO2 26      BUN 12   CREATININE 0.8   ALKPHOS 86   ALT 15   AST 18   BILITOT 0.5     CBC:   Recent Labs   Lab 08/06/19  0634   WBC 5.48   RBC 3.31*   HGB 9.5*   HCT 30.4*   *   MCV 92   MCH 28.7   MCHC 31.3*     Lipid Panel:   No results for input(s): CHOL, LDLCALC, HDL, TRIG in the last 168 hours.  Coagulation: No results for input(s): PT, INR, APTT in the last 168 hours.  Platelet Aggregation Study: No results for input(s): PLTAGG, PLTAGINTERP, PLTAGREGLACO, ADPPLTAGGREG in the last 168 hours.  Hgb A1C:   No results for input(s): HGBA1C in the last 168 hours.  TSH:   No results for input(s): TSH in the last 168 hours.    Diagnostic Results     Brain Imaging   MRI Brain acute interventional protocol 7-28-19 results:      Acute infarct within the left postcentral gyrus of the parietal lobe.  No proximal arterial occlusion demonstrated on MRA.    Remote infarcts within the bilateral precentral gyri and left middle frontal gyrus, consistent with remote bilateral MCA and left TJ infarcts.    Vessel Imaging:  CTA head and neck multiphase 7-28-19 results:  Bilateral frontal lobe encephalomalacia, likely secondary to remote left TJ and bilateral anterior MCA distribution infarcts.    Multifocal areas of irregularity and narrowing involving the intracranial arteries, specifically the left TJ and bilateral M2 branches which may be related to remote insults/atherosclerosis.  No evidence of acute proximal/large vessel occlusion.  However, further evaluation with MRI is suggested  for correlation and to exclude acute infarct.    Generalized cerebral volume loss and chronic microvascular ischemic changes.     Cardiac Imaging:  · Normal left ventricular systolic function. The estimated ejection fraction is 55%  · Normal LV diastolic function.  · Normal right ventricular systolic function.  · Normal central venous pressure (3 mm Hg).      CT head 7/28/19  Acute left anterior temporal zone of infarction again noted without hemorrhagic conversion.  Multiple remote areas of infarction and encephalomalacia, as above.  Left ethmoid sinus disease with suspicion for fungal colonization.

## 2019-08-06 NOTE — PLAN OF CARE
08/06/19 1434   Post-Acute Status   Post-Acute Authorization Placement   Post-Acute Placement Status Referrals Sent       Pt has been denied by the facilities his daughter picked.  Awaiting conversation with the family to see what the next step will be.  SW in contact with CM and Medical staff. Will continue to follow and offer support as needed.     Robert Arredondo, MAIA  [facility]   Ext. [phone]

## 2019-08-06 NOTE — PT/OT/SLP PROGRESS
"Speech Language Pathology Treatment    Patient Name:  Ayden Kincaid   MRN:  [id number]  Admitting Diagnosis: Stroke due to embolism of left middle cerebral artery    Recommendations:                 General Recommendations:  Dysphagia therapy and Speech/language therapy  Diet recommendations:  Dental Soft, Liquid Diet Level: Thin   Aspiration Precautions: HOB to 90 degrees, Small bites/sips and Strict aspiration precautions   General Precautions: Standard, aphasia, aspiration, fall  Communication strategies:  yes/no questions only and provide increased time to answer    Subjective     "One, two"  Patient goals: unable to state    Pain/Comfort:  · Pain Rating 1: 0/10  · Pain Rating Post-Intervention 1: 0/10    Objective:     Has the patient been evaluated by SLP for swallowing?   Yes  Keep patient NPO? No   Current Respiratory Status: room air      Pt seen bedside with no family present. Pt awake/alert. Pt did not model any simple commands and did not attempt to ID objects in a f=2. During formal y/n questions, pt responded accurately with 33% acc for simple personal questions. During functional task, pt appears to answer with higher level of accuracy but remains inconsistent. Nursing present to ask questions about numbness in his legs and arms. Pt did not consistently respond the same to the questions when repeated. However, at end of session, he responded to questions re: his bed and his tv volume correctly. Pt needed frequent cues to respond with only yes or no. Responses were often undifferentiated. He was able to count " one, two" with therapist given frequent cues and then perseverated on those numbers when attempting automatic phrases. He did not attempt to sing with therapist. Pt with occasional appropriate spontaneous speech such as " Ok" and " yea". White board updated. Education provided re: aphasia but reinforcement needed.     Assessment:     Ayden Kincaid is a 64 y.o. male with an SLP diagnosis of Aphasia " and Dysphagia.  He presents with progress towards goals.    Goals:   Multidisciplinary Problems     SLP Goals        Problem: SLP Goal    Goal Priority Disciplines Outcome   SLP Goal     SLP Ongoing (interventions implemented as appropriate)   Description:  Speech Language Pathology Goals  Goals expected to be met by 8/12:   1. Pt tolerate soft diet with thin liquids and no overt s/s of aspiration.  2. Pt will answer simple y/n questions with 60% accy, via any modality, with mod cues.  3. Pt will model simple commands with 40% accy given max cues.  4. Pt will elicit intelligible words x5 during auto speech tasks, given max cues.   5. Pt will id objects in field of 2 with 60% accy.    Speech Language Pathology Goals  Goals expected to be met by 8/5:   1. Pt will participate in ongoing assessment of swallow. - goal met.   2. Pt will answer simple y/n questions with 70% accy with min cues. - goal not met.   3. Pt will follow simple commands with 60% accy given max cues. - goal not met.  4. Pt will complete auto speech tasks with 50% accy given max cues. - goal not met.  5. Pt will id objects in field of 2 with 60% accy.  - goal not met.                              Plan:     · Patient to be seen:  4 x/week   · Plan of Care expires:  08/28/19  · Plan of Care reviewed with:  patient   · SLP Follow-Up:  Yes       Discharge recommendations:  nursing facility, skilled       Time Tracking:     SLP Treatment Date:   08/06/19  Speech Start Time:  1120  Speech Stop Time:  1133     Speech Total Time (min):  13 min    Billable Minutes: Speech Therapy Individual 13    JOSE LUIS Carr, CCC-SLP  08/06/2019

## 2019-08-06 NOTE — PLAN OF CARE
Problem: Adult Inpatient Plan of Care  Goal: Plan of Care Review  Outcome: Ongoing (interventions implemented as appropriate)  Report from Rebekah ORTEGA. Noted patient in bed awake, alert, and oriented. Denies pain and dyspnea. Bed in lowest position and call light within reach. Board updated and patient encouraged to notify staff when he needs assistance. Denies questions. Assessments per flow sheets. Will continue to monitor.

## 2019-08-06 NOTE — PROGRESS NOTES
[facility]  Vascular Neurology  Comprehensive Stroke Center  Progress Note    Assessment/Plan:     * Stroke due to embolism of left middle cerebral artery  65 y/o male with PMHx of alcohol and cocaine abuse L MCA syndrome received IV TPA at [facility]SH and transferred to [facility] for further care. CTA with no LVO. MRI with R MCA cortical infarct. TTE unremarkable, no LAE. Etiology currently ESUS vs drug abuse. Will need 30 day event monitor at discharge    Antithrombotics: ASA 81 mg daily    Statins: Lipitor 40 mg daily    Aggressive risk factor modification: HTN     Rehab efforts: The patient has been evaluated by a stroke team provider and the therapy needs have been fully considered based off the presenting complaints and exam findings. The following therapy evaluations are needed: PT evaluate and treat, OT evaluate and treat, SLP evaluate and treat, PM&R evaluate for appropriate placement - SNF in [facility]    Diagnostics ordered/pending: None     VTE prophylaxis: SCD's and heparin 5000 units sc Q8H     BP parameters: Goal -160        Drug abuse  Per daughter, history of drug abuse   Cocaine - use with sister and ex wifes nephew   Daughter would like to remove him from this situation and have him closer to her in [place]   Patient without signs or symptoms of withdrawal  No behavioral problems this admission    Explained and counseled on cessation of drugs although may not be fully appreciated due to aphasia    Cytotoxic cerebral edema  Area of cytotoxic cerebral edema identified when reviewing brain imaging in the territory of the L middle cerebral artery. There is not mass effect associated with it. We will continue to monitor the patients clinical exam for any worsening of symptoms which may indicate expansion of the stroke or the area of the edema resulting in the clinical change. The pattern is suggestive of ESUS vs drug abuse etiology        Alcohol abuse  No signs of symptoms of  withdrawl  Continue folic acid and thiamine  Will need counseling on cessation if aphasia improves    Essential hypertension  Stroke risk factor  SBP <160  BP at goal without antihypertensives    Aphasia  Due to stroke  Aggressive therapy    Received intravenous tissue plasminogen activator (t-PA) in emergency department  Completed close monitoring in [facility] 24 hours post administration   No complications          7/29/2019- Patient more alert, mild aphasia and dysarthria, TTE normal LA, no wall motion abnormalities, continue ASA and statin, rehab placement  7/30/19 - discussed care with older daughter, cathi. Appears that patient is unable to safely return to prior home site due to drug abuse activity (cocaine- him and his family In the area). The daughter wishes to place him in a safer environment near her in [facility]  7/31/19 - no events, neurologically stable, working on placement vs 24 hour care  8/1 referrals sent to facilities, waiting acceptance  8/2/19- stable neuro exam. No events overnight. Education regarding drug abuse given and reinforced   8/5/2019- stable neuro exam. No events overnight.       STROKE DOCUMENTATION   Acute Stroke Times   Last Known Normal Date: 07/27/19  Last Known Normal Time: 2200  Symptom Onset Date: 07/27/19  Symptom Onset Time: 2200  Stroke Team Called Date: 07/27/19  Stroke Team Called Time: 2249  Stroke Team Arrival Date: 07/28/19  Stroke Team Arrival Time: 0145  CT Interpretation Time: 2251  Decision to Treat Time for Alteplase: 2320(bolus given)    NIH Scale:  1a. Level of Consciousness: 0-->Alert, keenly responsive  1b. LOC Questions: 2-->Answers neither question correctly  1c. LOC Commands: 2-->Performs neither task correctly  2. Best Gaze: 0-->Normal  3. Visual: 0-->No visual loss  4. Facial Palsy: 1-->Minor paralysis (flattened nasolabial fold, asymmetry on smiling)  5a. Motor Arm, Left: 1-->Drift, limb holds 90 (or 45) degrees, but drifts down before full 10 seconds,  does not hit bed or other support  5b. Motor Arm, Right: 1-->Drift, limb holds 90 (or 45) degrees, but drifts down before full 10 secs, does not hit bed or other support  6a. Motor Leg, Left: 1-->Drift, leg falls by the end of the 5-sec period but does not hit bed  6b. Motor Leg, Right: 1-->Drift, leg falls by the end of the 5-sec period but does not hit bed  7. Limb Ataxia: 0-->Absent  8. Sensory: 0-->Normal, no sensory loss  9. Best Language: 2-->Severe aphasia, all communication is through fragmentary expression, great need for inference, questioning, and guessing by the listener. Range of information that can be exchanged is limited, listener carries burden of. . . (see row details)  10. Dysarthria: 2-->Severe dysarthria, patients speech is so slurred as to be unintelligible in the absence of or out of proportion to any dysphasia, or is mute/anarthric  11. Extinction and Inattention (formerly Neglect): 0-->No abnormality  Total (NIH Stroke Scale): 13       Modified Effingham Score: 0  Cashiers Coma Scale:    ABCD2 Score:    [id number] Score:   HAS -BLED Score:   ICH Score:   Hunt & Barksdale Classification:      Hemorrhagic change of an Ischemic Stroke: Does this patient have an ischemic stroke with hemorrhagic changes? No     Neurologic Chief Complaint: left MCA - M2 occlusion    Subjective:     Interval History: Patient is seen for follow-up neurological assessment and treatment recommendations: No events overnight. Will speak with family regarding placement.    HPI, Past Medical, Family, and Social History remains the same as documented in the initial encounter.     Review of Systems   Constitutional: Negative for fever.   Respiratory: Negative for cough.    Gastrointestinal: Negative for vomiting.   Neurological: Positive for facial asymmetry, speech difficulty and weakness.   Psychiatric/Behavioral: Negative for agitation.     Scheduled Meds:   aspirin  81 mg Oral Daily    atorvastatin  40 mg Oral Daily     "folic acid  1 mg Oral Daily    heparin (porcine)  5,000 Units Subcutaneous Q8H    senna-docusate 8.6-50 mg  1 tablet Oral BID    thiamine  100 mg Oral Daily     Continuous Infusions:  PRN Meds:acetaminophen, hydrALAZINE, ondansetron, sodium chloride 0.9%    Objective:     Vital Signs (Most Recent):  Temp: 97.5 °F (36.4 °C) (08/06/19 1244)  Pulse: 67 (08/06/19 1244)  Resp: 18 (08/06/19 1244)  BP: 108/69 (08/06/19 1244)  SpO2: 96 % (08/06/19 1244)  BP Location: Right arm    Vital Signs Range (Last 24H):  Temp:  [96.7 °F (35.9 °C)-98.4 °F (36.9 °C)]   Pulse:  [53-81]   Resp:  [16-18]   BP: (108-129)/(68-76)   SpO2:  [96 %-100 %]   BP Location: Right arm    Physical Exam   Constitutional: He appears well-developed and well-nourished. No distress.   HENT:   Head: Normocephalic and atraumatic.   Eyes: Pupils are equal, round, and reactive to light.   Cardiovascular: Normal rate.   Pulmonary/Chest: Effort normal. No respiratory distress.   Neurological: He is alert.   Skin: Skin is warm and dry.   Vitals reviewed.      Neurological Exam:   LOC: alert  Attention Span: poor  Language: global aphasia, says "I don't know" when asked to follow commands. Not able to engage with singing or counting   Articulation: Dysarthria  Orientation: Not oriented to place, and time  EOM (CN III, IV, VI): Full/intact  Pupils (CN II, III): PERRL  Facial Sensation (CN V): Normal  Facial Movement (CN VII): Lower facial weakness on the right  Motor: Arm left  Normal 5/5  Leg left  Normal 5/5  Arm right  Paresis: 4+/5  Leg right Paresis: 4+/5  Tone: Normal tone throughout    Laboratory:  CMP:   Recent Labs   Lab 08/06/19  0634   CALCIUM 8.8   ALBUMIN 2.7*   PROT 6.1      K 4.2   CO2 26      BUN 12   CREATININE 0.8   ALKPHOS 86   ALT 15   AST 18   BILITOT 0.5     CBC:   Recent Labs   Lab 08/06/19  0634   WBC 5.48   RBC 3.31*   HGB 9.5*   HCT 30.4*   *   MCV 92   MCH 28.7   MCHC 31.3*     Lipid Panel:   No results for input(s): " CHOL, LDLCALC, HDL, TRIG in the last 168 hours.  Coagulation: No results for input(s): PT, INR, APTT in the last 168 hours.  Platelet Aggregation Study: No results for input(s): PLTAGG, PLTAGINTERP, PLTAGREGLACO, ADPPLTAGGREG in the last 168 hours.  Hgb A1C:   No results for input(s): HGBA1C in the last 168 hours.  TSH:   No results for input(s): TSH in the last 168 hours.    Diagnostic Results     Brain Imaging   MRI Brain acute interventional protocol 7-28-19 results:      Acute infarct within the left postcentral gyrus of the parietal lobe.  No proximal arterial occlusion demonstrated on MRA.    Remote infarcts within the bilateral precentral gyri and left middle frontal gyrus, consistent with remote bilateral MCA and left TJ infarcts.    Vessel Imaging:  CTA head and neck multiphase 7-28-19 results:  Bilateral frontal lobe encephalomalacia, likely secondary to remote left TJ and bilateral anterior MCA distribution infarcts.    Multifocal areas of irregularity and narrowing involving the intracranial arteries, specifically the left TJ and bilateral M2 branches which may be related to remote insults/atherosclerosis.  No evidence of acute proximal/large vessel occlusion.  However, further evaluation with MRI is suggested for correlation and to exclude acute infarct.    Generalized cerebral volume loss and chronic microvascular ischemic changes.     Cardiac Imaging:  · Normal left ventricular systolic function. The estimated ejection fraction is 55%  · Normal LV diastolic function.  · Normal right ventricular systolic function.  · Normal central venous pressure (3 mm Hg).      CT head 7/28/19  Acute left anterior temporal zone of infarction again noted without hemorrhagic conversion.  Multiple remote areas of infarction and encephalomalacia, as above.  Left ethmoid sinus disease with suspicion for fungal colonization.           Colin Jimenes MD  Comprehensive Stroke Center  Department of Vascular Neurology    [facility]

## 2019-08-06 NOTE — PLAN OF CARE
Problem: SLP Goal  Goal: SLP Goal  Speech Language Pathology Goals  Goals expected to be met by 8/12:   1. Pt tolerate soft diet with thin liquids and no overt s/s of aspiration.  2. Pt will answer simple y/n questions with 60% accy, via any modality, with mod cues.  3. Pt will model simple commands with 40% accy given max cues.  4. Pt will elicit intelligible words x5 during auto speech tasks, given max cues.   5. Pt will id objects in field of 2 with 60% accy.    Speech Language Pathology Goals  Goals expected to be met by 8/5:   1. Pt will participate in ongoing assessment of swallow. - goal met.   2. Pt will answer simple y/n questions with 70% accy with min cues. - goal not met.   3. Pt will follow simple commands with 60% accy given max cues. - goal not met.  4. Pt will complete auto speech tasks with 50% accy given max cues. - goal not met.  5. Pt will id objects in field of 2 with 60% accy.  - goal not met.             Goals remain appropriate.     JOSE LUIS Carr, CCC-SLP  8/6/2019

## 2019-08-07 LAB
ALBUMIN SERPL BCP-MCNC: 2.7 G/DL (ref 3.5–5.2)
ALP SERPL-CCNC: 94 U/L (ref 55–135)
ALT SERPL W/O P-5'-P-CCNC: 19 U/L (ref 10–44)
ANION GAP SERPL CALC-SCNC: 7 MMOL/L (ref 8–16)
AST SERPL-CCNC: 21 U/L (ref 10–40)
BASOPHILS # BLD AUTO: 0.03 K/UL (ref 0–0.2)
BASOPHILS NFR BLD: 0.5 % (ref 0–1.9)
BILIRUB SERPL-MCNC: 0.4 MG/DL (ref 0.1–1)
BUN SERPL-MCNC: 13 MG/DL (ref 8–23)
CALCIUM SERPL-MCNC: 9 MG/DL (ref 8.7–10.5)
CHLORIDE SERPL-SCNC: 106 MMOL/L (ref 95–110)
CO2 SERPL-SCNC: 25 MMOL/L (ref 23–29)
CREAT SERPL-MCNC: 0.8 MG/DL (ref 0.5–1.4)
DIFFERENTIAL METHOD: ABNORMAL
EOSINOPHIL # BLD AUTO: 0.3 K/UL (ref 0–0.5)
EOSINOPHIL NFR BLD: 5.4 % (ref 0–8)
ERYTHROCYTE [DISTWIDTH] IN BLOOD BY AUTOMATED COUNT: 13.4 % (ref 11.5–14.5)
EST. GFR  (AFRICAN AMERICAN): >60 ML/MIN/1.73 M^2
EST. GFR  (NON AFRICAN AMERICAN): >60 ML/MIN/1.73 M^2
GLUCOSE SERPL-MCNC: 91 MG/DL (ref 70–110)
HCT VFR BLD AUTO: 31.3 % (ref 40–54)
HGB BLD-MCNC: 9.6 G/DL (ref 14–18)
IMM GRANULOCYTES # BLD AUTO: 0.09 K/UL (ref 0–0.04)
IMM GRANULOCYTES NFR BLD AUTO: 1.4 % (ref 0–0.5)
LYMPHOCYTES # BLD AUTO: 2.3 K/UL (ref 1–4.8)
LYMPHOCYTES NFR BLD: 37 % (ref 18–48)
MAGNESIUM SERPL-MCNC: 2.1 MG/DL (ref 1.6–2.6)
MCH RBC QN AUTO: 28.6 PG (ref 27–31)
MCHC RBC AUTO-ENTMCNC: 30.7 G/DL (ref 32–36)
MCV RBC AUTO: 93 FL (ref 82–98)
MONOCYTES # BLD AUTO: 0.7 K/UL (ref 0.3–1)
MONOCYTES NFR BLD: 10.4 % (ref 4–15)
NEUTROPHILS # BLD AUTO: 2.8 K/UL (ref 1.8–7.7)
NEUTROPHILS NFR BLD: 45.3 % (ref 38–73)
NRBC BLD-RTO: 0 /100 WBC
PHOSPHATE SERPL-MCNC: 4 MG/DL (ref 2.7–4.5)
PLATELET # BLD AUTO: 359 K/UL (ref 150–350)
PMV BLD AUTO: 10.4 FL (ref 9.2–12.9)
POCT GLUCOSE: 107 MG/DL (ref 70–110)
POCT GLUCOSE: 124 MG/DL (ref 70–110)
POCT GLUCOSE: 131 MG/DL (ref 70–110)
POCT GLUCOSE: 179 MG/DL (ref 70–110)
POCT GLUCOSE: 302 MG/DL (ref 70–110)
POTASSIUM SERPL-SCNC: 4.2 MMOL/L (ref 3.5–5.1)
PROT SERPL-MCNC: 6.1 G/DL (ref 6–8.4)
RBC # BLD AUTO: 3.36 M/UL (ref 4.6–6.2)
SODIUM SERPL-SCNC: 138 MMOL/L (ref 136–145)
WBC # BLD AUTO: 6.25 K/UL (ref 3.9–12.7)

## 2019-08-07 PROCEDURE — 97116 GAIT TRAINING THERAPY: CPT

## 2019-08-07 PROCEDURE — 25000003 PHARM REV CODE 250: Performed by: STUDENT IN AN ORGANIZED HEALTH CARE EDUCATION/TRAINING PROGRAM

## 2019-08-07 PROCEDURE — 99233 PR SUBSEQUENT HOSPITAL CARE,LEVL III: ICD-10-PCS | Mod: ,,, | Performed by: PSYCHIATRY & NEUROLOGY

## 2019-08-07 PROCEDURE — 25000003 PHARM REV CODE 250: Performed by: PHYSICIAN ASSISTANT

## 2019-08-07 PROCEDURE — 83735 ASSAY OF MAGNESIUM: CPT

## 2019-08-07 PROCEDURE — 80053 COMPREHEN METABOLIC PANEL: CPT

## 2019-08-07 PROCEDURE — 85025 COMPLETE CBC W/AUTO DIFF WBC: CPT

## 2019-08-07 PROCEDURE — 36415 COLL VENOUS BLD VENIPUNCTURE: CPT

## 2019-08-07 PROCEDURE — 20600001 HC STEP DOWN PRIVATE ROOM

## 2019-08-07 PROCEDURE — 97530 THERAPEUTIC ACTIVITIES: CPT

## 2019-08-07 PROCEDURE — 99233 SBSQ HOSP IP/OBS HIGH 50: CPT | Mod: ,,, | Performed by: PSYCHIATRY & NEUROLOGY

## 2019-08-07 PROCEDURE — 97110 THERAPEUTIC EXERCISES: CPT

## 2019-08-07 PROCEDURE — 63600175 PHARM REV CODE 636 W HCPCS: Performed by: PHYSICIAN ASSISTANT

## 2019-08-07 PROCEDURE — 92507 TX SP LANG VOICE COMM INDIV: CPT

## 2019-08-07 PROCEDURE — 94761 N-INVAS EAR/PLS OXIMETRY MLT: CPT

## 2019-08-07 PROCEDURE — 84100 ASSAY OF PHOSPHORUS: CPT

## 2019-08-07 RX ADMIN — ASPIRIN 81 MG CHEWABLE TABLET 81 MG: 81 TABLET CHEWABLE at 08:08

## 2019-08-07 RX ADMIN — SENNOSIDES,DOCUSATE SODIUM 1 TABLET: 8.6; 5 TABLET, FILM COATED ORAL at 08:08

## 2019-08-07 RX ADMIN — ATORVASTATIN CALCIUM 40 MG: 20 TABLET, FILM COATED ORAL at 08:08

## 2019-08-07 RX ADMIN — HEPARIN SODIUM 5000 UNITS: 5000 INJECTION, SOLUTION INTRAVENOUS; SUBCUTANEOUS at 03:08

## 2019-08-07 RX ADMIN — SENNOSIDES,DOCUSATE SODIUM 1 TABLET: 8.6; 5 TABLET, FILM COATED ORAL at 09:08

## 2019-08-07 RX ADMIN — FOLIC ACID 1 MG: 1 TABLET ORAL at 08:08

## 2019-08-07 RX ADMIN — HEPARIN SODIUM 5000 UNITS: 5000 INJECTION, SOLUTION INTRAVENOUS; SUBCUTANEOUS at 09:08

## 2019-08-07 RX ADMIN — HEPARIN SODIUM 5000 UNITS: 5000 INJECTION, SOLUTION INTRAVENOUS; SUBCUTANEOUS at 05:08

## 2019-08-07 RX ADMIN — THERA TABS 1 TABLET: TAB at 03:08

## 2019-08-07 RX ADMIN — Medication 100 MG: at 08:08

## 2019-08-07 NOTE — PLAN OF CARE
SW sent referrals to [facility] and [facility] via . SW will continue to assist with needs as indicated.       Elsi Byers LMSW  [facility]

## 2019-08-07 NOTE — PLAN OF CARE
08/07/19 1443   Post-Acute Status   Post-Acute Authorization Placement   Post-Acute Placement Status Referrals Sent       Sent to 3 more SNF's facilities in [place].  Awaiting acceptance.  SW in contact with CM and Medical staff. Will continue to follow and offer support as needed.     Robert Arredondo, MAIA [facility]   Ext. [phone]

## 2019-08-07 NOTE — PLAN OF CARE
Problem: Adult Inpatient Plan of Care  Goal: Plan of Care Review  Recommendations    1. Continue soft diet. 2. Cancel Boost Plus TID.  Goals: 1. Pt's intake >75% meals, supplements x 7 days.  Nutrition Goal Status: progressing towards goal  Communication of RD Recs: (POC)

## 2019-08-07 NOTE — PT/OT/SLP PROGRESS
"Speech Language Pathology Treatment    Patient Name:  Ayden Kincaid   MRN:  [id number]  Admitting Diagnosis: Stroke due to embolism of left middle cerebral artery    Recommendations:                 General Recommendations:  Dysphagia therapy and Speech/language therapy  Diet recommendations:  Dental Soft, Liquid Diet Level: Thin   Aspiration Precautions: HOB to 90 degrees, Small bites/sips and Strict aspiration precautions   General Precautions: Standard, aphasia  Communication strategies:  yes/no questions only and provide increased time to answer    Subjective     Pt awake; RN at the bedside completing routine care     Pain/Comfort:  Pain Rating 1: 0/10(pt unable to truly report however appearing comfortable)  Pain Rating Post-Intervention 1: (same as initial pain rating )    Objective:     Has the patient been evaluated by SLP for swallowing?   Yes  Keep patient NPO? No   Current Respiratory Status: room air      Pt seen bedside with no family present. Pt awake/alert. Pt continues to present with severe dysarthria which further affects SLP ability to interpret verbal responses. SLP modeled simple single step commands and pt did not demonstrate ability to imitate warranting Kake assistance to complete. Pt did not demonstrate ability identify objects from a field of 2 despite SLP max cueing /review. SLP attempted to elicit speech via simple songs in unison with SLP. Pt unable to match SLP rising and falling intonation and no true words appreciated. SLP modeled auto speech tasks and pt with consistent attempts to verbalized however responses were unintelligible and most similar to open vowel sounds. Pt with occasional appropriate spontaneous speech such as " huh, yeah, k" . Pt did appropriately participate in pt greeting waving to SLP "bye" at end of session. Education provided re: aphasia but reinforcement needed. Pt remains with global receptive and expressive aphasia. Whiteboard current.     Assessment:     Ayden" Sanjiv is a 64 y.o. male with an SLP diagnosis of Aphasia and Dysphagia.        Goals:   Multidisciplinary Problems     SLP Goals        Problem: SLP Goal    Goal Priority Disciplines Outcome   SLP Goal     SLP Ongoing (interventions implemented as appropriate)   Description:  Speech Language Pathology Goals  Goals expected to be met by 8/12:   1. Pt tolerate soft diet with thin liquids and no overt s/s of aspiration.  2. Pt will answer simple y/n questions with 60% accy, via any modality, with mod cues.  3. Pt will model simple commands with 40% accy given max cues.  4. Pt will elicit intelligible words x5 during auto speech tasks, given max cues.   5. Pt will id objects in field of 2 with 60% accy.                            Plan:     · Patient to be seen:  4 x/week   · Plan of Care expires:  08/28/19  · Plan of Care reviewed with:  patient   · SLP Follow-Up:  Yes       Discharge recommendations:  nursing facility, skilled       Time Tracking:     SLP Treatment Date:   08/07/19  Speech Start Time:  0904  Speech Stop Time:  0916     Speech Total Time (min):  12 min    Billable Minutes: Speech Therapy Individual 13    Yulisa Price CCC-SLP  08/07/2019

## 2019-08-07 NOTE — PLAN OF CARE
Problem: SLP Goal  Goal: SLP Goal  Speech Language Pathology Goals  Goals expected to be met by 8/12:   1. Pt tolerate soft diet with thin liquids and no overt s/s of aspiration.  2. Pt will answer simple y/n questions with 60% accy, via any modality, with mod cues.  3. Pt will model simple commands with 40% accy given max cues.  4. Pt will elicit intelligible words x5 during auto speech tasks, given max cues.   5. Pt will id objects in field of 2 with 60% accy.             Pt with slow progress towards goals     Yulisa Price MS, CCC-SLP  Speech Language Pathologist  Pager: [phone]  Date 8/7/2019

## 2019-08-07 NOTE — PROGRESS NOTES
[facility]  Vascular Neurology  Comprehensive Stroke Center  Progress Note    Assessment/Plan:     * Stroke due to embolism of left middle cerebral artery  65 y/o male with PMHx of alcohol and cocaine abuse L MCA syndrome received IV TPA at [facility] and [place] [facility] [place] CTA with no LVO. MRI with R MCA cortical infarct. TTE unremarkable, no LAE. Etiology currently ESUS vs drug abuse. Will need 30 day event monitor at discharge    Antithrombotics: ASA 81 mg daily    Statins: Lipitor 40 mg daily    Aggressive risk factor modification: HTN     Rehab efforts: The patient has been evaluated by a stroke team provider and the therapy needs have been fully considered based off the presenting complaints and exam findings. The following therapy evaluations are needed: PT evaluate and treat, OT evaluate and treat, SLP evaluate and treat, PM&R evaluate for appropriate placement - SNF in [facility]    Diagnostics ordered/pending: None     VTE prophylaxis: SCD's and heparin 5000 units sc Q8H     BP parameters: Goal -160        Drug abuse  Per daughter, history of drug abuse   Cocaine - use with sister and ex wifes nephew   Daughter would like to remove him from this situation and have him closer to her in [place]   Patient without signs or symptoms of withdrawal  No behavioral problems this admission    Explained and counseled on cessation of drugs although may not be fully appreciated due to aphasia    Cytotoxic cerebral edema  Area of cytotoxic cerebral edema identified when reviewing brain imaging in the territory of the L middle cerebral artery. There is not mass effect associated with it. We will continue to monitor the patients clinical exam for any worsening of symptoms which may indicate expansion of the stroke or the area of the edema resulting in the clinical change. The pattern is suggestive of ESUS vs drug abuse etiology        Alcohol abuse  No signs of symptoms of  withdrawl  Continue folic acid and thiamine  Will need counseling on cessation if aphasia improves    Essential hypertension  Stroke risk factor  SBP <160  BP at goal without antihypertensives    Aphasia  Due to stroke  Aggressive therapy    Received intravenous tissue plasminogen activator (t-PA) in emergency department  Completed close monitoring in [facility] 24 hours post administration   No complications          7/29/2019- Patient more alert, mild aphasia and dysarthria, TTE normal LA, no wall motion abnormalities, continue ASA and statin, rehab placement  7/30/19 - discussed care with older daughter, cathi. Appears that patient is unable to safely return to prior home site due to drug abuse activity (cocaine- him and his family In the area). The daughter wishes to place him in a safer environment near her in [facility]  7/31/19 - no events, neurologically stable, working on placement vs 24 hour care  8/1 referrals sent to facilities, waiting acceptance  8/2/19- stable neuro exam. No events overnight. Education regarding drug abuse given and reinforced   8/5/2019- stable neuro exam. No events overnight.   8/7- spoke with daughter- will look into placement in facilities here in [place], but she is open to possibility of taking him home for care    STROKE DOCUMENTATION   Acute Stroke Times   Last Known Normal Date: 07/27/19  Last Known Normal Time: 2200  Symptom Onset Date: 07/27/19  Symptom Onset Time: 2200  Stroke Team Called Date: 07/27/19  Stroke Team Called Time: 2249  Stroke Team Arrival Date: 07/28/19  Stroke Team Arrival Time: 0145  CT Interpretation Time: 2251  Decision to Treat Time for Alteplase: 2320(bolus given)    NIH Scale:  1a. Level of Consciousness: 1-->Not alert, but arousable by minor stimulation to obey, answer, or respond  1b. LOC Questions: 2-->Answers neither question correctly  1c. LOC Commands: 2-->Performs neither task correctly  2. Best Gaze: 0-->Normal  3. Visual: 0-->No visual  loss  4. Facial Palsy: 1-->Minor paralysis (flattened nasolabial fold, asymmetry on smiling)  5a. Motor Arm, Left: 1-->Drift, limb holds 90 (or 45) degrees, but drifts down before full 10 seconds, does not hit bed or other support  5b. Motor Arm, Right: 1-->Drift, limb holds 90 (or 45) degrees, but drifts down before full 10 secs, does not hit bed or other support  6a. Motor Leg, Left: 1-->Drift, leg falls by the end of the 5-sec period but does not hit bed  6b. Motor Leg, Right: 1-->Drift, leg falls by the end of the 5-sec period but does not hit bed  7. Limb Ataxia: 0-->Absent  8. Sensory: 0-->Normal, no sensory loss  9. Best Language: 2-->Severe aphasia, all communication is through fragmentary expression, great need for inference, questioning, and guessing by the listener. Range of information that can be exchanged is limited, listener carries burden of. . . (see row details)  10. Dysarthria: 2-->Severe dysarthria, patients speech is so slurred as to be unintelligible in the absence of or out of proportion to any dysphasia, or is mute/anarthric  11. Extinction and Inattention (formerly Neglect): 0-->No abnormality  Total (NIH Stroke Scale): 14       Modified [place] Score: 0  [place] Coma Scale:    ABCD2 Score:    [id number] Score:   HAS -BLED Score:   ICH Score:   Hunt & Barksdale Classification:      Hemorrhagic change of an Ischemic Stroke: Does this patient have an ischemic stroke with hemorrhagic changes? No     Neurologic Chief Complaint: left MCA - M2 occlusion    Subjective:     Interval History: Patient is seen for follow-up neurological assessment and treatment recommendations: No events overnight. Daughter open to possibility of caring for him at home if unable to be accepted at facility.     HPI, Past Medical, Family, and Social History remains the same as documented in the initial encounter.     Review of Systems   Constitutional: Negative for fever.   Respiratory: Negative for cough.    Cardiovascular:  "Negative for leg swelling.   Gastrointestinal: Negative for vomiting.   Neurological: Positive for facial asymmetry, speech difficulty and weakness.   Psychiatric/Behavioral: Negative for agitation.     Scheduled Meds:   aspirin  81 mg Oral Daily    atorvastatin  40 mg Oral Daily    folic acid  1 mg Oral Daily    heparin (porcine)  5,000 Units Subcutaneous Q8H    senna-docusate 8.6-50 mg  1 tablet Oral BID    thiamine  100 mg Oral Daily     Continuous Infusions:  PRN Meds:acetaminophen, hydrALAZINE, ondansetron, sodium chloride 0.9%    Objective:     Vital Signs (Most Recent):  Temp: 98.2 °F (36.8 °C) (08/07/19 0740)  Pulse: 63 (08/07/19 0740)  Resp: 16 (08/07/19 0740)  BP: 116/68 (08/07/19 0740)  SpO2: 96 % (08/07/19 0740)  BP Location: Right arm    Vital Signs Range (Last 24H):  Temp:  [96.4 °F (35.8 °C)-98.2 °F (36.8 °C)]   Pulse:  [59-69]   Resp:  [12-18]   BP: (108-127)/(65-73)   SpO2:  [96 %-99 %]   BP Location: Right arm    Physical Exam   Constitutional: He appears well-developed and well-nourished. No distress.   HENT:   Head: Normocephalic and atraumatic.   Eyes: Pupils are equal, round, and reactive to light.   Cardiovascular: Normal rate.   Pulmonary/Chest: Effort normal. No respiratory distress.   Abdominal: He exhibits no distension.   Neurological: He is alert.   Skin: Skin is warm and dry.   Vitals reviewed.      Neurological Exam:   LOC: alert  Attention Span: poor  Language: global aphasia, says "I don't know" when asked to follow commands. Not able to engage with singing or counting   Articulation: Dysarthria  Orientation: Not oriented to place, and time  EOM (CN III, IV, VI): Full/intact  Pupils (CN II, III): PERRL  Facial Sensation (CN V): Normal  Facial Movement (CN VII): Lower facial weakness on the right  Motor: Arm left  Normal 5/5  Leg left  Normal 5/5  Arm right  Paresis: 4+/5  Leg right Paresis: 4+/5  Tone: Normal tone throughout    Laboratory:  CMP:   Recent Labs   Lab " 08/07/19  0518   CALCIUM 9.0   ALBUMIN 2.7*   PROT 6.1      K 4.2   CO2 25      BUN 13   CREATININE 0.8   ALKPHOS 94   ALT 19   AST 21   BILITOT 0.4     CBC:   Recent Labs   Lab 08/07/19  0518   WBC 6.25   RBC 3.36*   HGB 9.6*   HCT 31.3*   *   MCV 93   MCH 28.6   MCHC 30.7*     Lipid Panel:   No results for input(s): CHOL, LDLCALC, HDL, TRIG in the last 168 hours.  Coagulation: No results for input(s): PT, INR, APTT in the last 168 hours.  Platelet Aggregation Study: No results for input(s): PLTAGG, PLTAGINTERP, PLTAGREGLACO, ADPPLTAGGREG in the last 168 hours.  Hgb A1C:   No results for input(s): HGBA1C in the last 168 hours.  TSH:   No results for input(s): TSH in the last 168 hours.    Diagnostic Results     Brain Imaging   MRI Brain acute interventional protocol 7-28-19 results:      Acute infarct within the left postcentral gyrus of the parietal lobe.  No proximal arterial occlusion demonstrated on MRA.    Remote infarcts within the bilateral precentral gyri and left middle frontal gyrus, consistent with remote bilateral MCA and left TJ infarcts.    Vessel Imaging:  CTA head and neck multiphase 7-28-19 results:  Bilateral frontal lobe encephalomalacia, likely secondary to remote left TJ and bilateral anterior MCA distribution infarcts.    Multifocal areas of irregularity and narrowing involving the intracranial arteries, specifically the left TJ and bilateral M2 branches which may be related to remote insults/atherosclerosis.  No evidence of acute proximal/large vessel occlusion.  However, further evaluation with MRI is suggested for correlation and to exclude acute infarct.    Generalized cerebral volume loss and chronic microvascular ischemic changes.     Cardiac Imaging:  · Normal left ventricular systolic function. The estimated ejection fraction is 55%  · Normal LV diastolic function.  · Normal right ventricular systolic function.  · Normal central venous pressure (3 mm Hg).      CT  head 7/28/19  Acute left anterior temporal zone of infarction again noted without hemorrhagic conversion.  Multiple remote areas of infarction and encephalomalacia, as above.  Left ethmoid sinus disease with suspicion for fungal colonization.           Colin Jimenes MD  [facility]  Department of Vascular Neurology   [facility]

## 2019-08-07 NOTE — PHYSICIAN QUERY
PT Name: Ayden Kincaid  MR #: [id number]    Physician Query Form - Consultant Diagnosis Clarification     CDS/: Juliana Alcala RN, CDS               Contact information: [email or web address]  This form is a permanent document in the medical record.     Query Date: August 7, 2019      By submitting this query, we are merely seeking further clarification of documentation.  Please utilize your independent clinical judgment when addressing the question(s) below.      The Medical record contains the following:   Diagnosis Supporting Clinical Information Location in Medical Record     Moderate Protein-Calorie Malnutrition  --Alcohol abuse  History of, per daughters    WT: 149lbs  BMI: 21.4  --Moderate malnutrition         -Pt's 3 dtgrs report they haven't seen pt > 4 months, possible > 8 months. They do confirm pt has lost weight however they are unable to confirm amt. NFPE completed - pt with mild to moderate muscle wasting in calves, clavicles, triceps and scapular, severe wasting in temples. Nourished in  interresous region. Pt meets criteria for malnutrition however unable to confirm if weight loss acute or chronic given vague timeline.         - Pt remains NPO per SLP recommendations        -Body Fat Depletion: mild and moderate depletion of orbitals, triceps and thoracic and lumbar region       -Muscle Mass Depletion: mild and moderate depletion of clavicle region, scapular region and lower extremities , severe depletion in temples          -Weight Loss: positive for weight loss, amt unknown per family    --Moderate Protein-Calorie Malnutrition            -Pt with good intake dysphagia soft diet over the past 24 hrs, says his appetite is improved. Pt noted with moderate malnutrition per NFPE 7/29; has 4 lb (2.6%) wt loss since admission.           -1. Continue dysphagia soft diet. 2. Add Boost Plus TID.  Goals: 1. Pt's intake >75% meals, supplements x 7 days.            -Commercial Beverage: Boost  Plus TID   H&P      RD c/s 7/29                                        RD Note 8/1         Do you agree with the Consultants diagnosis of _Moderate Protein-Calorie Malnutrition _____?    [ X ] Yes   [  ] No   [  ] Clinically insignificant   [  ] Other/Clarification of findings:   [  ] Clinically undetermined

## 2019-08-07 NOTE — SUBJECTIVE & OBJECTIVE
Neurologic Chief Complaint: left MCA - M2 occlusion    Subjective:     Interval History: Patient is seen for follow-up neurological assessment and treatment recommendations: No events overnight. Daughter open to possibility of caring for him at home if unable to be accepted at facility.     HPI, Past Medical, Family, and Social History remains the same as documented in the initial encounter.     Review of Systems   Constitutional: Negative for fever.   Respiratory: Negative for cough.    Cardiovascular: Negative for leg swelling.   Gastrointestinal: Negative for vomiting.   Neurological: Positive for facial asymmetry, speech difficulty and weakness.   Psychiatric/Behavioral: Negative for agitation.     Scheduled Meds:   aspirin  81 mg Oral Daily    atorvastatin  40 mg Oral Daily    folic acid  1 mg Oral Daily    heparin (porcine)  5,000 Units Subcutaneous Q8H    senna-docusate 8.6-50 mg  1 tablet Oral BID    thiamine  100 mg Oral Daily     Continuous Infusions:  PRN Meds:acetaminophen, hydrALAZINE, ondansetron, sodium chloride 0.9%    Objective:     Vital Signs (Most Recent):  Temp: 98.2 °F (36.8 °C) (08/07/19 0740)  Pulse: 63 (08/07/19 0740)  Resp: 16 (08/07/19 0740)  BP: 116/68 (08/07/19 0740)  SpO2: 96 % (08/07/19 0740)  BP Location: Right arm    Vital Signs Range (Last 24H):  Temp:  [96.4 °F (35.8 °C)-98.2 °F (36.8 °C)]   Pulse:  [59-69]   Resp:  [12-18]   BP: (108-127)/(65-73)   SpO2:  [96 %-99 %]   BP Location: Right arm    Physical Exam   Constitutional: He appears well-developed and well-nourished. No distress.   HENT:   Head: Normocephalic and atraumatic.   Eyes: Pupils are equal, round, and reactive to light.   Cardiovascular: Normal rate.   Pulmonary/Chest: Effort normal. No respiratory distress.   Abdominal: He exhibits no distension.   Neurological: He is alert.   Skin: Skin is warm and dry.   Vitals reviewed.      Neurological Exam:   LOC: alert  Attention Span: poor  Language: global aphasia,  "says "I don't know" when asked to follow commands. Not able to engage with singing or counting   Articulation: Dysarthria  Orientation: Not oriented to place, and time  EOM (CN III, IV, VI): Full/intact  Pupils (CN II, III): PERRL  Facial Sensation (CN V): Normal  Facial Movement (CN VII): Lower facial weakness on the right  Motor: Arm left  Normal 5/5  Leg left  Normal 5/5  Arm right  Paresis: 4+/5  Leg right Paresis: 4+/5  Tone: Normal tone throughout    Laboratory:  CMP:   Recent Labs   Lab 08/07/19  0518   CALCIUM 9.0   ALBUMIN 2.7*   PROT 6.1      K 4.2   CO2 25      BUN 13   CREATININE 0.8   ALKPHOS 94   ALT 19   AST 21   BILITOT 0.4     CBC:   Recent Labs   Lab 08/07/19 0518   WBC 6.25   RBC 3.36*   HGB 9.6*   HCT 31.3*   *   MCV 93   MCH 28.6   MCHC 30.7*     Lipid Panel:   No results for input(s): CHOL, LDLCALC, HDL, TRIG in the last 168 hours.  Coagulation: No results for input(s): PT, INR, APTT in the last 168 hours.  Platelet Aggregation Study: No results for input(s): PLTAGG, PLTAGINTERP, PLTAGREGLACO, ADPPLTAGGREG in the last 168 hours.  Hgb A1C:   No results for input(s): HGBA1C in the last 168 hours.  TSH:   No results for input(s): TSH in the last 168 hours.    Diagnostic Results     Brain Imaging   MRI Brain acute interventional protocol 7-28-19 results:      Acute infarct within the left postcentral gyrus of the parietal lobe.  No proximal arterial occlusion demonstrated on MRA.    Remote infarcts within the bilateral precentral gyri and left middle frontal gyrus, consistent with remote bilateral MCA and left TJ infarcts.    Vessel Imaging:  CTA head and neck multiphase 7-28-19 results:  Bilateral frontal lobe encephalomalacia, likely secondary to remote left TJ and bilateral anterior MCA distribution infarcts.    Multifocal areas of irregularity and narrowing involving the intracranial arteries, specifically the left TJ and bilateral M2 branches which may be related to " remote insults/atherosclerosis.  No evidence of acute proximal/large vessel occlusion.  However, further evaluation with MRI is suggested for correlation and to exclude acute infarct.    Generalized cerebral volume loss and chronic microvascular ischemic changes.     Cardiac Imaging:  · Normal left ventricular systolic function. The estimated ejection fraction is 55%  · Normal LV diastolic function.  · Normal right ventricular systolic function.  · Normal central venous pressure (3 mm Hg).      CT head 7/28/19  Acute left anterior temporal zone of infarction again noted without hemorrhagic conversion.  Multiple remote areas of infarction and encephalomalacia, as above.  Left ethmoid sinus disease with suspicion for fungal colonization.

## 2019-08-07 NOTE — PLAN OF CARE
Problem: Physical Therapy Goal  Goal: Physical Therapy Goal    Goals to be met by 8/10/2019    1. Pt will perform rolling to the R and L with independence- MET.   2. Pt will perform supine to sit from both sides of the bed with independence- MET.  3. Pt will perform sit to supine with independence- MET.  4. Pt will perform sit to stand transfers with independence.    5. Pt will perform bed <> chair transfers with independence.  6. Pt will perform gait x 400 feet with independence while performing dynamic gait activities     Outcome: Ongoing (interventions implemented as appropriate)  Patient's strength and endurance continue to be good to fair, but decreased judgement and balance limit independence with functional mobility.

## 2019-08-07 NOTE — PROGRESS NOTES
"[facility]  Adult Nutrition  Progress Note    SUMMARY       Recommendations    1. Continue soft diet. 2. Cancel Boost Plus TID.  Goals: 1. Pt's intake >75% meals, supplements x 7 days.  Nutrition Goal Status: progressing towards goal  Communication of RD Recs: (POC)    Reason for Assessment    Reason For Assessment: RD follow-up  Diagnosis: stroke/CVA  Relevant Medical History: CVA, COPD, HTN  Interdisciplinary Rounds: did not attend  General Information Comments: Pt noted with acute moderate malnutrition per NFPE 7/29 is on modified diet and has very good intake meals (%). Per pt, he does not like the Boost Plus and does not want it.  Nutrition Discharge Planning: Pt to tolerate soft diet, follow low sodium diet.    Nutrition Risk Screen    Nutrition Risk Screen: no indicators present    Nutrition/Diet History    Spiritual, Cultural Beliefs, Islam Practices, Values that Affect Care: no  Factors Affecting Nutritional Intake: NPO, impaired cognitive status/motor control    Anthropometrics    Temp: 98.1 °F (36.7 °C)  Height Method: Estimated  Height: 5' 10" (177.8 cm)  Height (inches): 70 in  Weight Method: Bed Scale  Weight: 71 kg (156 lb 8.4 oz)  Weight (lb): 156.53 lb  Ideal Body Weight (IBW), Male: 166 lb  % Ideal Body Weight, Male (lb): 89.76 lb  BMI (Calculated): 21.4  BMI Grade: 18.5-24.9 - normal  Weight Loss: unintentional(DELMA amt per family and pt)       Lab/Procedures/Meds    Pertinent Labs Reviewed: reviewed  Pertinent Labs Comments: ----------  Pertinent Medications Reviewed: reviewed  Pertinent Medications Comments: statin, folic acid, thiamine    Estimated/Assessed Needs    Weight Used For Calorie Calculations: 67.6 kg (149 lb 0.5 oz)  Energy Calorie Requirements (kcal): 7134-5670  Energy Need Method: Kcal/kg(30-35kcal/kg)  Protein Requirements: 81-95g(1.2-1.4g/kg)  Weight Used For Protein Calculations: 67.6 kg (149 lb 0.5 oz)  Fluid Requirements (mL): 1mL/kcal or per MD   "   RDA Method (mL): 2028         Nutrition Prescription Ordered    Current Diet Order: dysphagia soft, thin liiquids  Oral Nutrition Supplement: Boost Plus TID    Evaluation of Received Nutrient/Fluid Intake    IV Fluid (mL): 0  I/O: -5L since admission  Comments: LBM 8/06  % Intake of Estimated Energy Needs: 75 - 100 %  % Meal Intake: 75 - 100 %    Nutrition Risk    Level of Risk/Frequency of Follow-up: low     Assessment and Plan  Nutrition Problem:  Moderate Protein-Calorie Malnutrition      Related to (etiology):  Unknown etiology, possible decreased po intake     Signs and Symptoms (as evidenced by):  Energy Intake: DELMA  Body Fat Depletion: mild and moderate depletion of orbitals, triceps and thoracic and lumbar region   Muscle Mass Depletion: mild and moderate depletion of clavicle region, scapular region and lower extremities , severe depletion in temples  Weight Loss: positive for weight loss, amt unknown per family     Interventions/Recommendations (treatment strategy):  Collaboration of care with providers  Commercial Beverage: Boost Plus TID.     Nutrition Diagnosis Status:  continues     Monitor and Evaluation    Food and Nutrient Intake: energy intake, food and beverage intake  Food and Nutrient Adminstration: diet order  Knowledge/Beliefs/Attitudes: food and nutrition knowledge/skill  Anthropometric Measurements: weight, weight change, body mass index  Biochemical Data, Medical Tests and Procedures: gastrointestinal profile, electrolyte and renal panel, glucose/endocrine profile, inflammatory profile, lipid profile  Nutrition-Focused Physical Findings: overall appearance     Malnutrition Assessment  Malnutrition Type: other (see comments)(DELMA)          Weight Loss (Malnutrition): (weight loss per family, DELMA amt)   Orbital Region (Subcutaneous Fat Loss): severe depletion  Upper Arm Region (Subcutaneous Fat Loss): mild depletion  Thoracic and Lumbar Region: moderate depletion   Hindu Region (Muscle  Loss): moderate depletion  Clavicle Bone Region (Muscle Loss): moderate depletion  Clavicle and Acromion Bone Region (Muscle Loss): moderate depletion  Scapular Bone Region (Muscle Loss): moderate depletion  Dorsal Hand (Muscle Loss): well nourished  Patellar Region (Muscle Loss): mild depletion  Anterior Thigh Region (Muscle Loss): mild depletion  Posterior Calf Region (Muscle Loss): moderate depletion                 Nutrition Follow-Up    RD Follow-up?: Yes

## 2019-08-07 NOTE — PT/OT/SLP PROGRESS
Physical Therapy Treatment    Patient Name:  Ayden Kincaid   MRN:  [id number]    Recommendations:     Discharge Recommendations:  nursing facility, skilled   Discharge Equipment Recommendations: (TBD, pending progress.)   Barriers to discharge: Inaccessible home and Decreased caregiver support    Assessment:     Ayden Kincaid is a 64 y.o. male admitted with a medical diagnosis of Stroke due to embolism of left middle cerebral artery.  He presents with the following impairments/functional limitations:  weakness, impaired endurance, impaired self care skills, impaired functional mobilty, gait instability, impaired balance, decreased safety awareness, decreased coordination, decreased upper extremity function, decreased lower extremity function . Patient appeared very drowsy as treatment initiated, but was more responsive as treatment progressed. Patient presents with inconsistent ability to follow motor commands and decreased B LE Coordination while performing there ex.    Rehab Prognosis: Good; patient would benefit from acute skilled PT services to address these deficits and reach maximum level of function.    Recent Surgery: * No surgery found *      Plan:     During this hospitalization, patient to be seen 2 x/week to address the identified rehab impairments via gait training, therapeutic activities, therapeutic exercises, neuromuscular re-education and progress toward the following goals:    · Plan of Care Expires:  08/28/19    Subjective     Chief Complaint: fatigue  Patient/Family Comments/goals: none stated  Pain/Comfort:  · Pain Rating 1: 0/10  · Pain Rating Post-Intervention 1: 0/10      Objective:     Communicated with nsg prior to session.  Patient found HOB elevated with bed alarm, telemetry upon PT entry to room.     General Precautions: Standard, aphasia, aspiration, fall   Orthopedic Precautions:N/A   Braces: N/A     Functional Mobility:  · Bed Mobility:     · Rolling Right: supervision  · Scooting:  supervision  · Supine to Sit: supervision  · Sit to Supine: supervision  · Transfers:     · Sit to Stand:  contact guard assistance with hand-held assist  · Bed to Chair: contact guard assistance with  hand-held assist  using  Stand Pivot  · Gait: 144 ft with HHA with min assistance.      AM-PAC 6 CLICK MOBILITY  Turning over in bed (including adjusting bedclothes, sheets and blankets)?: 4  Sitting down on and standing up from a chair with arms (e.g., wheelchair, bedside commode, etc.): 3  Moving from lying on back to sitting on the side of the bed?: 4  Moving to and from a bed to a chair (including a wheelchair)?: 3  Need to walk in hospital room?: 3  Climbing 3-5 steps with a railing?: 3  Basic Mobility Total Score: 20       Therapeutic Activities and Exercises:   Donned a second gown. Patient sat at EOB for a few minutes as patient appeared drowsy.   There ex in sitting: LAQ, HIP FLEX AND AP 2X15 REPS B LE with mod cues for proper demonstration.    Patient left HOB elevated with all lines intact, call button in reach and bed alarm on..    GOALS:   Multidisciplinary Problems     Physical Therapy Goals        Problem: Physical Therapy Goal    Goal Priority Disciplines Outcome Goal Variances Interventions   Physical Therapy Goal     PT, PT/OT Ongoing (interventions implemented as appropriate)     Description:    Goals to be met by 8/10/2019    1. Pt will perform rolling to the R and L with independence- MET.   2. Pt will perform supine to sit from both sides of the bed with independence- MET.  3. Pt will perform sit to supine with independence- MET.  4. Pt will perform sit to stand transfers with independence.    5. Pt will perform bed <> chair transfers with independence.  6. Pt will perform gait x 400 feet with independence while performing dynamic gait activities                       Time Tracking:     PT Received On: 08/07/19  PT Start Time: 1131     PT Stop Time: 1155  PT Total Time (min): 24 min     Billable  Minutes: Gait Training 14 and Therapeutic Exercise 10    Treatment Type: Treatment  PT/PTA: PT     PTA Visit Number: 1     Fco Lujan PTA  08/07/2019

## 2019-08-07 NOTE — PT/OT/SLP PROGRESS
"Occupational Therapy   Treatment    Name: Ayden Kincaid  MRN: [id number]  Admitting Diagnosis:  Stroke due to embolism of left middle cerebral artery       Recommendations:     Discharge Recommendations: nursing facility, skilled  Discharge Equipment Recommendations:  (TBD)  Barriers to discharge:  (pt is homeless)    Assessment:     Ayden Kincaid is a 64 y.o. male with a medical diagnosis of Stroke due to embolism of left middle cerebral artery.  He presents with performance deficits including weakness, impaired self care skills, impaired functional mobilty, gait instability, decreased upper extremity function, decreased coordination, impaired balance. Pt is making slow progress toward OT goals-- continues with speech deficits and R UE coordination deficits, impacting ability to complete ADLs. Pt will require assistance upon D/C.    Rehab Prognosis: Fair; patient would benefit from acute skilled OT services to address these deficits and reach maximum level of function.       Plan:     Patient to be seen 2 x/week to address the above listed problems via self-care/home management, therapeutic activities, therapeutic exercises, neuromuscular re-education, cognitive retraining  · Plan of Care Expires: 08/29/19  · Plan of Care Reviewed with: patient    Subjective     Pain/Comfort:  · Pain Rating 1: 0/10  · Pain Rating Post-Intervention 1: 0/10    Objective:     Communicated with: RN prior to session. Patient found right sidelying with telemetry upon OT entry to room.  "Go home." Pt expressing desire to return home throughout session.    General Precautions: Standard, aphasia, aspiration, fall   Orthopedic Precautions:N/A   Braces: N/A     Occupational Performance:     Bed Mobility:    · Patient completed Supine to Sit with stand by assistance  · Patient completed Sit to Supine with stand by assistance     Functional Mobility/Transfers:  · Patient completed Sit <> Stand Transfer with stand by assistance with no assistive " device from EOB 2 trials  · Functional Mobility: Within room household distance with CGA hand-held assist on the R intermittently; declined further mobility    Activities of Daily Living:  · Grooming: contact guard assistance standing at sink to wash face-- cues to initiate task  · Upper Body Dressing: minimum assistance to don gown around back  · Lower Body Dressing: minimum assistance to don socks while seated; difficulty with coordination of R hand      AMPAC 6 Click ADL: 18    Treatment & Education:  Pt requires cues for safety, able to follow commands intermittently; completed seated and standing ADLs and mobility within room    Patient left supine with all lines intact, call button in reach and bed alarm onEducation:      GOALS:   Multidisciplinary Problems     Occupational Therapy Goals        Problem: Occupational Therapy Goal    Goal Priority Disciplines Outcome Interventions   Occupational Therapy Goal     OT, PT/OT Ongoing (interventions implemented as appropriate)    Description:  Goals to be met by: 8/10     Patient will increase functional independence with ADLs by performing:    UE Dressing with Stand-by Assistance.  LE Dressing with Supervision.  Grooming while standing with Stand-by Assistance.  Toileting from toilet with Stand-by Assistance for hygiene and clothing management.   Supine to sit with Supervision.  Stand pivot transfers with Supervision.  Pt will follow 3/4 one step commands.                      Time Tracking:     OT Date of Treatment: 08/07/19  OT Start Time: 0945  OT Stop Time: 0957  OT Total Time (min): 12 min    Billable Minutes:Therapeutic Activity 12 minutes    ADRYAN Krishnan  8/7/2019

## 2019-08-08 LAB
POCT GLUCOSE: 112 MG/DL (ref 70–110)
POCT GLUCOSE: 131 MG/DL (ref 70–110)
POCT GLUCOSE: 280 MG/DL (ref 70–110)

## 2019-08-08 PROCEDURE — 25000003 PHARM REV CODE 250: Performed by: PHYSICIAN ASSISTANT

## 2019-08-08 PROCEDURE — 99233 PR SUBSEQUENT HOSPITAL CARE,LEVL III: ICD-10-PCS | Mod: ,,, | Performed by: PSYCHIATRY & NEUROLOGY

## 2019-08-08 PROCEDURE — 25000003 PHARM REV CODE 250: Performed by: STUDENT IN AN ORGANIZED HEALTH CARE EDUCATION/TRAINING PROGRAM

## 2019-08-08 PROCEDURE — 92507 TX SP LANG VOICE COMM INDIV: CPT

## 2019-08-08 PROCEDURE — 63600175 PHARM REV CODE 636 W HCPCS: Performed by: PHYSICIAN ASSISTANT

## 2019-08-08 PROCEDURE — 94761 N-INVAS EAR/PLS OXIMETRY MLT: CPT

## 2019-08-08 PROCEDURE — 99233 SBSQ HOSP IP/OBS HIGH 50: CPT | Mod: ,,, | Performed by: PSYCHIATRY & NEUROLOGY

## 2019-08-08 PROCEDURE — 20600001 HC STEP DOWN PRIVATE ROOM

## 2019-08-08 RX ADMIN — HEPARIN SODIUM 5000 UNITS: 5000 INJECTION, SOLUTION INTRAVENOUS; SUBCUTANEOUS at 06:08

## 2019-08-08 RX ADMIN — ATORVASTATIN CALCIUM 40 MG: 20 TABLET, FILM COATED ORAL at 09:08

## 2019-08-08 RX ADMIN — SENNOSIDES,DOCUSATE SODIUM 1 TABLET: 8.6; 5 TABLET, FILM COATED ORAL at 09:08

## 2019-08-08 RX ADMIN — SENNOSIDES,DOCUSATE SODIUM 1 TABLET: 8.6; 5 TABLET, FILM COATED ORAL at 08:08

## 2019-08-08 RX ADMIN — HEPARIN SODIUM 5000 UNITS: 5000 INJECTION, SOLUTION INTRAVENOUS; SUBCUTANEOUS at 02:08

## 2019-08-08 RX ADMIN — ASPIRIN 81 MG CHEWABLE TABLET 81 MG: 81 TABLET CHEWABLE at 09:08

## 2019-08-08 RX ADMIN — THERA TABS 1 TABLET: TAB at 09:08

## 2019-08-08 RX ADMIN — HEPARIN SODIUM 5000 UNITS: 5000 INJECTION, SOLUTION INTRAVENOUS; SUBCUTANEOUS at 10:08

## 2019-08-08 RX ADMIN — ACETAMINOPHEN 650 MG: 325 TABLET ORAL at 08:08

## 2019-08-08 NOTE — PLAN OF CARE
Problem: SLP Goal  Goal: SLP Goal  Speech Language Pathology Goals  Goals expected to be met by 8/12:   1. Pt tolerate soft diet with thin liquids and no overt s/s of aspiration.  2. Pt will answer simple y/n questions with 60% accy, via any modality, with mod cues.  3. Pt will model simple commands with 40% accy given max cues.  4. Pt will elicit intelligible words x5 during auto speech tasks, given max cues.   5. Pt will id objects in field of 2 with 60% accy.           Pt with improved ability to ID objects in a f=2.     JOSE LUIS Carr, CCC-SLP  8/8/2019

## 2019-08-08 NOTE — PT/OT/SLP PROGRESS
"Speech Language Pathology Treatment    Patient Name:  Ayden Kincaid   MRN:  [id number]  Admitting Diagnosis: Stroke due to embolism of left middle cerebral artery    Recommendations:                 General Recommendations:  Speech/language therapy  Diet recommendations:  Dental Soft, Liquid Diet Level: Thin   Aspiration Precautions: HOB to 90 degrees, Small bites/sips and Standard aspiration precautions   General Precautions: Standard, aphasia  Communication strategies:  yes/no questions only and provide increased time to answer    Subjective     "Yea"  Patient goals: to go home    Pain/Comfort:  · Pain Rating 1: 0/10  · Pain Rating Post-Intervention 1: 0/10    Objective:     Has the patient been evaluated by SLP for swallowing?   Yes  Keep patient NPO? No   Current Respiratory Status: room air      Pt seen bedside with no family present. Pt with severe dysarthria but pt grossly approximating " want to go home". Occasional clear words noted such as " ok" and " yea". Pt He was able to grossly approximate counting 1-3 but all other numbers were unintelligible. He did not attempt to sing with therapist. He was able to complete 1/10 automatic phrases. He did not model any commands. In a f=2, he was able to ID objects accurately with 50% acc. Pt did point to an item on 5/6 attempts which is an improvement. He responded to simple y/n questions with 20% acc. Responses were often undifferentiated and needed frequent cues to state yes or no. At end of session, when asked if he wanted his tv on, pt stated " no" yet found the remote and turned up the volume. White board updated.     Assessment:     Ayden Kincaid is a 64 y.o. male with an SLP diagnosis of Aphasia, Dysphagia and Dysarthria.  He presents with progress towards goals.    Goals:   Multidisciplinary Problems     SLP Goals        Problem: SLP Goal    Goal Priority Disciplines Outcome   SLP Goal     SLP Ongoing (interventions implemented as appropriate)   Description:  " Speech Language Pathology Goals  Goals expected to be met by 8/12:   1. Pt tolerate soft diet with thin liquids and no overt s/s of aspiration.  2. Pt will answer simple y/n questions with 60% accy, via any modality, with mod cues.  3. Pt will model simple commands with 40% accy given max cues.  4. Pt will elicit intelligible words x5 during auto speech tasks, given max cues.   5. Pt will id objects in field of 2 with 60% accy.                            Plan:     · Patient to be seen:  4 x/week   · Plan of Care expires:  08/28/19  · Plan of Care reviewed with:  patient   · SLP Follow-Up:  Yes       Discharge recommendations:  nursing facility, skilled       Time Tracking:     SLP Treatment Date:   08/08/19  Speech Start Time:  1250  Speech Stop Time:  1306     Speech Total Time (min):  16 min    Billable Minutes: Speech Therapy Individual 16    JOSE LUIS Carr, CCC-SLP  08/08/2019

## 2019-08-08 NOTE — SUBJECTIVE & OBJECTIVE
"Neurologic Chief Complaint: left MCA - M2 occlusion    Subjective:     Interval History: Patient is seen for follow-up neurological assessment and treatment recommendations: No events overnight. Daughter open to possibility of caring for him at home if unable to be accepted at facility.     HPI, Past Medical, Family, and Social History remains the same as documented in the initial encounter.     Review of Systems   Constitutional: Negative for fever.   Respiratory: Negative for cough.    Cardiovascular: Negative for leg swelling.   Gastrointestinal: Negative for vomiting.   Neurological: Positive for facial asymmetry, speech difficulty and weakness.   Psychiatric/Behavioral: Negative for agitation.     Scheduled Meds:   aspirin  81 mg Oral Daily    atorvastatin  40 mg Oral Daily    heparin (porcine)  5,000 Units Subcutaneous Q8H    multivitamin  1 tablet Oral Daily    senna-docusate 8.6-50 mg  1 tablet Oral BID     Continuous Infusions:  PRN Meds:acetaminophen, hydrALAZINE, ondansetron, sodium chloride 0.9%    Objective:     Vital Signs (Most Recent):  Temp: 97.6 °F (36.4 °C) (08/08/19 0750)  Pulse: 60 (08/08/19 0750)  Resp: 20 (08/08/19 0750)  BP: 121/80 (08/08/19 0750)  SpO2: 99 % (08/08/19 0750)  BP Location: Right arm    Vital Signs Range (Last 24H):  Temp:  [96.7 °F (35.9 °C)-98.5 °F (36.9 °C)]   Pulse:  [60-74]   Resp:  [18-20]   BP: (106-130)/(59-80)   SpO2:  [99 %]   BP Location: Right arm    Physical Exam   Constitutional: He appears well-developed and well-nourished. No distress.   HENT:   Head: Normocephalic and atraumatic.   Eyes: Pupils are equal, round, and reactive to light.   Cardiovascular: Normal rate.   Pulmonary/Chest: Effort normal. No respiratory distress.   Abdominal: He exhibits no distension.   Neurological: He is alert.   Skin: Skin is warm and dry.   Vitals reviewed.      Neurological Exam:   LOC: alert  Attention Span: poor  Language: global aphasia, says "I don't know" when asked to " follow commands. Not able to engage with singing or counting   Articulation: Dysarthria  Orientation: Not oriented to place, and time  EOM (CN III, IV, VI): Full/intact  Pupils (CN II, III): PERRL  Facial Sensation (CN V): Normal  Facial Movement (CN VII): Lower facial weakness on the right  Motor: Arm left  Normal 5/5  Leg left  Normal 5/5  Arm right  Paresis: 4+/5  Leg right Paresis: 4+/5  Tone: Normal tone throughout    Laboratory:  CMP:   No results for input(s): GLUCOSE, CALCIUM, ALBUMIN, PROT, NA, K, CO2, CL, BUN, CREATININE, ALKPHOS, ALT, AST, BILITOT in the last 24 hours.  CBC:   Recent Labs   Lab 08/07/19  0518   WBC 6.25   RBC 3.36*   HGB 9.6*   HCT 31.3*   *   MCV 93   MCH 28.6   MCHC 30.7*     Lipid Panel:   No results for input(s): CHOL, LDLCALC, HDL, TRIG in the last 168 hours.  Coagulation: No results for input(s): PT, INR, APTT in the last 168 hours.  Platelet Aggregation Study: No results for input(s): PLTAGG, PLTAGINTERP, PLTAGREGLACO, ADPPLTAGGREG in the last 168 hours.  Hgb A1C:   No results for input(s): HGBA1C in the last 168 hours.  TSH:   No results for input(s): TSH in the last 168 hours.    Diagnostic Results     Brain Imaging   MRI Brain acute interventional protocol 7-28-19 results:      Acute infarct within the left postcentral gyrus of the parietal lobe.  No proximal arterial occlusion demonstrated on MRA.    Remote infarcts within the bilateral precentral gyri and left middle frontal gyrus, consistent with remote bilateral MCA and left TJ infarcts.    Vessel Imaging:  CTA head and neck multiphase 7-28-19 results:  Bilateral frontal lobe encephalomalacia, likely secondary to remote left TJ and bilateral anterior MCA distribution infarcts.    Multifocal areas of irregularity and narrowing involving the intracranial arteries, specifically the left TJ and bilateral M2 branches which may be related to remote insults/atherosclerosis.  No evidence of acute proximal/large vessel  occlusion.  However, further evaluation with MRI is suggested for correlation and to exclude acute infarct.    Generalized cerebral volume loss and chronic microvascular ischemic changes.     Cardiac Imaging:  · Normal left ventricular systolic function. The estimated ejection fraction is 55%  · Normal LV diastolic function.  · Normal right ventricular systolic function.  · Normal central venous pressure (3 mm Hg).      CT head 7/28/19  Acute left anterior temporal zone of infarction again noted without hemorrhagic conversion.  Multiple remote areas of infarction and encephalomalacia, as above.  Left ethmoid sinus disease with suspicion for fungal colonization.

## 2019-08-08 NOTE — PLAN OF CARE
Problem: Adult Inpatient Plan of Care  Goal: Plan of Care Review  Outcome: Ongoing (interventions implemented as appropriate)  Patient remains free from injury or fall. No neuro changes noted or reported. Cardiac rhythm and blood glucose monitored. Plan= DC SNF pending.Will continue to monitor.

## 2019-08-08 NOTE — PROGRESS NOTES
[facility]  Vascular Neurology  Comprehensive Stroke Center  Progress Note    Assessment/Plan:     * Stroke due to embolism of left middle cerebral artery  65 y/o male with PMHx of alcohol and cocaine abuse L MCA syndrome received IV TPA at [facility] and [place] [facility] [place] CTA with no LVO. MRI with R MCA cortical infarct. TTE unremarkable, no LAE. Etiology currently ESUS vs drug abuse. Will need 30 day event monitor at discharge    Antithrombotics: ASA 81 mg daily    Statins: Lipitor 40 mg daily    Aggressive risk factor modification: HTN     Rehab efforts: The patient has been evaluated by a stroke team provider and the therapy needs have been fully considered based off the presenting complaints and exam findings. The following therapy evaluations are needed: PT evaluate and treat, OT evaluate and treat, SLP evaluate and treat, PM&R evaluate for appropriate placement - SNF in [facility]    Diagnostics ordered/pending: None     VTE prophylaxis: SCD's and heparin 5000 units sc Q8H     BP parameters: Goal -160        Drug abuse  Per daughter, history of drug abuse   Cocaine - use with sister and ex wifes nephew   Daughter would like to remove him from this situation and have him closer to her in [place]   Patient without signs or symptoms of withdrawal  No behavioral problems this admission    Explained and counseled on cessation of drugs although may not be fully appreciated due to aphasia    Cytotoxic cerebral edema  Area of cytotoxic cerebral edema identified when reviewing brain imaging in the territory of the L middle cerebral artery. There is not mass effect associated with it. We will continue to monitor the patients clinical exam for any worsening of symptoms which may indicate expansion of the stroke or the area of the edema resulting in the clinical change. The pattern is suggestive of ESUS vs drug abuse etiology        Alcohol abuse  No signs of symptoms of  withdrawl  Continue folic acid and thiamine  Will need counseling on cessation if aphasia improves    Essential hypertension  Stroke risk factor  SBP <160  BP at goal without antihypertensives    Aphasia  Due to stroke  Aggressive therapy    Received intravenous tissue plasminogen activator (t-PA) in emergency department  Completed close monitoring in [facility] 24 hours post administration   No complications          7/29/2019- Patient more alert, mild aphasia and dysarthria, TTE normal LA, no wall motion abnormalities, continue ASA and statin, rehab placement  7/30/19 - discussed care with older daughter, cathi. Appears that patient is unable to safely return to prior home site due to drug abuse activity (cocaine- him and his family In the area). The daughter wishes to place him in a safer environment near her in [facility]  7/31/19 - no events, neurologically stable, working on placement vs 24 hour care  8/1 referrals sent to facilities, waiting acceptance  8/2/19- stable neuro exam. No events overnight. Education regarding drug abuse given and reinforced   8/5/2019- stable neuro exam. No events overnight.   8/7- spoke with daughter- will look into placement in facilities here in [place], but she is open to possibility of taking him home for care    STROKE DOCUMENTATION   Acute Stroke Times   Last Known Normal Date: 07/27/19  Last Known Normal Time: 2200  Symptom Onset Date: 07/27/19  Symptom Onset Time: 2200  Stroke Team Called Date: 07/27/19  Stroke Team Called Time: 2249  Stroke Team Arrival Date: 07/28/19  Stroke Team Arrival Time: 0145  CT Interpretation Time: 2251  Decision to Treat Time for Alteplase: 2320(bolus given)    NIH Scale:  1a. Level of Consciousness: 0-->Alert, keenly responsive  1b. LOC Questions: 2-->Answers neither question correctly  1c. LOC Commands: 0-->Performs both tasks correctly  2. Best Gaze: 0-->Normal  3. Visual: 0-->No visual loss  4. Facial Palsy: 1-->Minor paralysis (flattened  nasolabial fold, asymmetry on smiling)  5a. Motor Arm, Left: 0-->No drift, limb holds 90 (or 45) degrees for full 10 secs  5b. Motor Arm, Right: 1-->Drift, limb holds 90 (or 45) degrees, but drifts down before full 10 secs, does not hit bed or other support  6a. Motor Leg, Left: 1-->Drift, leg falls by the end of the 5-sec period but does not hit bed  6b. Motor Leg, Right: 1-->Drift, leg falls by the end of the 5-sec period but does not hit bed  7. Limb Ataxia: 0-->Absent  8. Sensory: 0-->Normal, no sensory loss  9. Best Language: 2-->Severe aphasia, all communication is through fragmentary expression, great need for inference, questioning, and guessing by the listener. Range of information that can be exchanged is limited, listener carries burden of. . . (see row details)  10. Dysarthria: 2-->Severe dysarthria, patients speech is so slurred as to be unintelligible in the absence of or out of proportion to any dysphasia, or is mute/anarthric  11. Extinction and Inattention (formerly Neglect): 0-->No abnormality  Total (NIH Stroke Scale): 10       Modified [place] Score: 0  [place] Coma Scale:    ABCD2 Score:    [id number] Score:   HAS -BLED Score:   ICH Score:   Hunt & Barksdale Classification:      Hemorrhagic change of an Ischemic Stroke: Does this patient have an ischemic stroke with hemorrhagic changes? No     Neurologic Chief Complaint: left MCA - M2 occlusion    Subjective:     Interval History: Patient is seen for follow-up neurological assessment and treatment recommendations: No events overnight. Daughter open to possibility of caring for him at home if unable to be accepted at facility.     HPI, Past Medical, Family, and Social History remains the same as documented in the initial encounter.     Review of Systems   Constitutional: Negative for fever.   Respiratory: Negative for cough.    Cardiovascular: Negative for leg swelling.   Gastrointestinal: Negative for vomiting.   Neurological: Positive for facial  "asymmetry, speech difficulty and weakness.   Psychiatric/Behavioral: Negative for agitation.     Scheduled Meds:   aspirin  81 mg Oral Daily    atorvastatin  40 mg Oral Daily    heparin (porcine)  5,000 Units Subcutaneous Q8H    multivitamin  1 tablet Oral Daily    senna-docusate 8.6-50 mg  1 tablet Oral BID     Continuous Infusions:  PRN Meds:acetaminophen, hydrALAZINE, ondansetron, sodium chloride 0.9%    Objective:     Vital Signs (Most Recent):  Temp: 97.6 °F (36.4 °C) (08/08/19 0750)  Pulse: 60 (08/08/19 0750)  Resp: 20 (08/08/19 0750)  BP: 121/80 (08/08/19 0750)  SpO2: 99 % (08/08/19 0750)  BP Location: Right arm    Vital Signs Range (Last 24H):  Temp:  [96.7 °F (35.9 °C)-98.5 °F (36.9 °C)]   Pulse:  [60-74]   Resp:  [18-20]   BP: (106-130)/(59-80)   SpO2:  [99 %]   BP Location: Right arm    Physical Exam   Constitutional: He appears well-developed and well-nourished. No distress.   HENT:   Head: Normocephalic and atraumatic.   Eyes: Pupils are equal, round, and reactive to light.   Cardiovascular: Normal rate.   Pulmonary/Chest: Effort normal. No respiratory distress.   Abdominal: He exhibits no distension.   Neurological: He is alert.   Skin: Skin is warm and dry.   Vitals reviewed.      Neurological Exam:   LOC: alert  Attention Span: poor  Language: global aphasia, says "I don't know" when asked to follow commands. Not able to engage with singing or counting   Articulation: Dysarthria  Orientation: Not oriented to place, and time  EOM (CN III, IV, VI): Full/intact  Pupils (CN II, III): PERRL  Facial Sensation (CN V): Normal  Facial Movement (CN VII): Lower facial weakness on the right  Motor: Arm left  Normal 5/5  Leg left  Normal 5/5  Arm right  Paresis: 4+/5  Leg right Paresis: 4+/5  Tone: Normal tone throughout    Laboratory:  CMP:   No results for input(s): GLUCOSE, CALCIUM, ALBUMIN, PROT, NA, K, CO2, CL, BUN, CREATININE, ALKPHOS, ALT, AST, BILITOT in the last 24 hours.  CBC:   Recent Labs   Lab " 08/07/19  0518   WBC 6.25   RBC 3.36*   HGB 9.6*   HCT 31.3*   *   MCV 93   MCH 28.6   MCHC 30.7*     Lipid Panel:   No results for input(s): CHOL, LDLCALC, HDL, TRIG in the last 168 hours.  Coagulation: No results for input(s): PT, INR, APTT in the last 168 hours.  Platelet Aggregation Study: No results for input(s): PLTAGG, PLTAGINTERP, PLTAGREGLACO, ADPPLTAGGREG in the last 168 hours.  Hgb A1C:   No results for input(s): HGBA1C in the last 168 hours.  TSH:   No results for input(s): TSH in the last 168 hours.    Diagnostic Results     Brain Imaging   MRI Brain acute interventional protocol 7-28-19 results:      Acute infarct within the left postcentral gyrus of the parietal lobe.  No proximal arterial occlusion demonstrated on MRA.    Remote infarcts within the bilateral precentral gyri and left middle frontal gyrus, consistent with remote bilateral MCA and left TJ infarcts.    Vessel Imaging:  CTA head and neck multiphase 7-28-19 results:  Bilateral frontal lobe encephalomalacia, likely secondary to remote left TJ and bilateral anterior MCA distribution infarcts.    Multifocal areas of irregularity and narrowing involving the intracranial arteries, specifically the left TJ and bilateral M2 branches which may be related to remote insults/atherosclerosis.  No evidence of acute proximal/large vessel occlusion.  However, further evaluation with MRI is suggested for correlation and to exclude acute infarct.    Generalized cerebral volume loss and chronic microvascular ischemic changes.     Cardiac Imaging:  · Normal left ventricular systolic function. The estimated ejection fraction is 55%  · Normal LV diastolic function.  · Normal right ventricular systolic function.  · Normal central venous pressure (3 mm Hg).      CT head 7/28/19  Acute left anterior temporal zone of infarction again noted without hemorrhagic conversion.  Multiple remote areas of infarction and encephalomalacia, as above.  Left ethmoid  sinus disease with suspicion for fungal colonization.           Colin Jimenes MD  Comprehensive Stroke Center  Department of Vascular Neurology   [facility]

## 2019-08-09 PROCEDURE — 63600175 PHARM REV CODE 636 W HCPCS: Performed by: PHYSICIAN ASSISTANT

## 2019-08-09 PROCEDURE — 20600001 HC STEP DOWN PRIVATE ROOM

## 2019-08-09 PROCEDURE — 99233 SBSQ HOSP IP/OBS HIGH 50: CPT | Mod: GC,,, | Performed by: PSYCHIATRY & NEUROLOGY

## 2019-08-09 PROCEDURE — 63600175 PHARM REV CODE 636 W HCPCS: Performed by: INTERNAL MEDICINE

## 2019-08-09 PROCEDURE — 99233 PR SUBSEQUENT HOSPITAL CARE,LEVL III: ICD-10-PCS | Mod: GC,,, | Performed by: PSYCHIATRY & NEUROLOGY

## 2019-08-09 PROCEDURE — 25000003 PHARM REV CODE 250: Performed by: INTERNAL MEDICINE

## 2019-08-09 PROCEDURE — 25000003 PHARM REV CODE 250: Performed by: PHYSICIAN ASSISTANT

## 2019-08-09 PROCEDURE — 25000003 PHARM REV CODE 250: Performed by: STUDENT IN AN ORGANIZED HEALTH CARE EDUCATION/TRAINING PROGRAM

## 2019-08-09 RX ADMIN — SENNOSIDES,DOCUSATE SODIUM 1 TABLET: 8.6; 5 TABLET, FILM COATED ORAL at 08:08

## 2019-08-09 RX ADMIN — HEPARIN SODIUM 5000 UNITS: 5000 INJECTION, SOLUTION INTRAVENOUS; SUBCUTANEOUS at 02:08

## 2019-08-09 RX ADMIN — HEPARIN SODIUM 5000 UNITS: 5000 INJECTION, SOLUTION INTRAVENOUS; SUBCUTANEOUS at 09:08

## 2019-08-09 RX ADMIN — SENNOSIDES,DOCUSATE SODIUM 1 TABLET: 8.6; 5 TABLET, FILM COATED ORAL at 09:08

## 2019-08-09 RX ADMIN — ATORVASTATIN CALCIUM 40 MG: 20 TABLET, FILM COATED ORAL at 08:08

## 2019-08-09 RX ADMIN — ASPIRIN 81 MG CHEWABLE TABLET 81 MG: 81 TABLET CHEWABLE at 08:08

## 2019-08-09 RX ADMIN — HEPARIN SODIUM 5000 UNITS: 5000 INJECTION, SOLUTION INTRAVENOUS; SUBCUTANEOUS at 06:08

## 2019-08-09 RX ADMIN — THERA TABS 1 TABLET: TAB at 08:08

## 2019-08-09 NOTE — PROGRESS NOTES
[facility]  Vascular Neurology  Comprehensive Stroke Center  Progress Note    Assessment/Plan:     * Stroke due to embolism of left middle cerebral artery  63 y/o male with PMHx of alcohol and cocaine abuse L MCA syndrome received IV TPA at OSH and [place] [facility] [place]. CTA with no LVO. MRI with R MCA cortical infarct. TTE unremarkable, no LAE. Etiology likely arrhythmia secondary to drug abuse. Will need 30 day event monitor at discharge.    Antithrombotics: ASA 81 mg daily    Statins: Lipitor 40 mg daily    Aggressive risk factor modification: HTN     Rehab efforts: The patient has been evaluated by a stroke team provider and the therapy needs have been fully considered based off the presenting complaints and exam findings. The following therapy evaluations are needed: PT evaluate and treat, OT evaluate and treat, SLP evaluate and treat, PM&R evaluate for appropriate placement - SNF in [place] or home with family if unable to place    Diagnostics ordered/pending: None     VTE prophylaxis: SCD's and heparin 5000 units sc Q8H     BP parameters: Goal -160        Drug abuse  Per daughter, history of drug abuse   Cocaine - use with sister and ex wifes nephew   Daughter would like to remove him from this situation and have him closer to her in [place]   Patient without signs or symptoms of withdrawal  No behavioral problems this admission    Explained and counseled on cessation of drugs although may not be fully appreciated due to aphasia    Cytotoxic cerebral edema  Area of cytotoxic cerebral edema identified when reviewing brain imaging in the territory of the L middle cerebral artery. There is not mass effect associated with it. We will continue to monitor the patients clinical exam for any worsening of symptoms which may indicate expansion of the stroke or the area of the edema resulting in the clinical change. The pattern is suggestive of ESUS vs drug abuse  etiology        Alcohol abuse  No signs of symptoms of withdrawl  Continue folic acid and thiamine  Will need counseling on cessation if aphasia improves    Essential hypertension  Stroke risk factor  SBP <160  BP at goal without antihypertensives    Aphasia  Due to stroke  Aggressive therapy    Received intravenous tissue plasminogen activator (t-PA) in emergency department  Completed close monitoring in [facility] 24 hours post administration   No complications          7/29/2019- Patient more alert, mild aphasia and dysarthria, TTE normal LA, no wall motion abnormalities, continue ASA and statin, rehab placement  7/30/19 - discussed care with older daughter, cathi. Appears that patient is unable to safely return to prior home site due to drug abuse activity (cocaine- him and his family In the area). The daughter wishes to place him in a safer environment near her in [facility]  7/31/19 - no events, neurologically stable, working on placement vs 24 hour care  8/1 referrals sent to facilities, waiting acceptance  8/2/19- stable neuro exam. No events overnight. Education regarding drug abuse given and reinforced   8/5/2019- stable neuro exam. No events overnight.   8/7- spoke with daughter- will look into placement in facilities here in [place], but she is open to possibility of taking him home for care  8/8- will speak with daughter regarding possible D/C home tomorrow    STROKE DOCUMENTATION   Acute Stroke Times   Last Known Normal Date: 07/27/19  Last Known Normal Time: 2200  Symptom Onset Date: 07/27/19  Symptom Onset Time: 2200  Stroke Team Called Date: 07/27/19  Stroke Team Called Time: 2249  Stroke Team Arrival Date: 07/28/19  Stroke Team Arrival Time: 0145  CT Interpretation Time: 2251  Decision to Treat Time for Alteplase: 2320(bolus given)    NIH Scale:  1a. Level of Consciousness: 1-->Not alert, but arousable by minor stimulation to obey, answer, or respond  1b. LOC Questions: 2-->Answers neither  question correctly  1c. LOC Commands: 2-->Performs neither task correctly  2. Best Gaze: 0-->Normal  3. Visual: 0-->No visual loss  4. Facial Palsy: 1-->Minor paralysis (flattened nasolabial fold, asymmetry on smiling)  5a. Motor Arm, Left: 0-->No drift, limb holds 90 (or 45) degrees for full 10 secs  5b. Motor Arm, Right: 0-->No drift, limb holds 90 (or 45) degrees for full 10 secs  6a. Motor Leg, Left: 0-->No drift, leg holds 30 degree position for full 5 secs  6b. Motor Leg, Right: 0-->No drift, leg holds 30 degree position for full 5 secs  7. Limb Ataxia: 0-->Absent  8. Sensory: 0-->Normal, no sensory loss  9. Best Language: 2-->Severe aphasia, all communication is through fragmentary expression, great need for inference, questioning, and guessing by the listener. Range of information that can be exchanged is limited, listener carries burden of. . . (see row details)  10. Dysarthria: 2-->Severe dysarthria, patients speech is so slurred as to be unintelligible in the absence of or out of proportion to any dysphasia, or is mute/anarthric  11. Extinction and Inattention (formerly Neglect): 0-->No abnormality  Total (NIH Stroke Scale): 10       Modified [place] Score: 0  [place] Coma Scale:    ABCD2 Score:    [id number] Score:   HAS -BLED Score:   ICH Score:   Hunt & Barksdale Classification:      Hemorrhagic change of an Ischemic Stroke: Does this patient have an ischemic stroke with hemorrhagic changes? No     Neurologic Chief Complaint: left MCA - M2 occlusion    Subjective:     Interval History: Patient is seen for follow-up neurological assessment and treatment recommendations: No events overnight. Daughter open to possibility of caring for him at home if unable to be accepted at facility.     HPI, Past Medical, Family, and Social History remains the same as documented in the initial encounter.     Review of Systems   Constitutional: Negative for fever.   Respiratory: Negative for cough.    Cardiovascular: Negative  "for leg swelling.   Gastrointestinal: Negative for vomiting.   Neurological: Positive for facial asymmetry, speech difficulty and weakness.   Psychiatric/Behavioral: Negative for agitation.     Scheduled Meds:   aspirin  81 mg Oral Daily    atorvastatin  40 mg Oral Daily    heparin (porcine)  5,000 Units Subcutaneous Q8H    multivitamin  1 tablet Oral Daily    senna-docusate 8.6-50 mg  1 tablet Oral BID     Continuous Infusions:  PRN Meds:acetaminophen, hydrALAZINE, ondansetron, sodium chloride 0.9%    Objective:     Vital Signs (Most Recent):  Temp: 97 °F (36.1 °C) (08/09/19 0722)  Pulse: (!) 59 (08/09/19 0722)  Resp: 18 (08/09/19 0722)  BP: 109/66 (08/09/19 0722)  SpO2: 100 % (08/09/19 0722)  BP Location: Right arm    Vital Signs Range (Last 24H):  Temp:  [97 °F (36.1 °C)-98.2 °F (36.8 °C)]   Pulse:  [51-68]   Resp:  [18]   BP: (107-123)/(66-71)   SpO2:  [93 %-100 %]   BP Location: Right arm    Physical Exam   Constitutional: He appears well-developed and well-nourished. No distress.   HENT:   Head: Normocephalic and atraumatic.   Eyes: Pupils are equal, round, and reactive to light.   Cardiovascular: Normal rate.   Pulmonary/Chest: Effort normal. No respiratory distress.   Abdominal: He exhibits no distension.   Neurological: He is alert.   Skin: Skin is warm and dry.   Vitals reviewed.      Neurological Exam:   LOC: alert  Attention Span: poor  Language: global aphasia, says "I don't know" when asked to follow commands. Not able to engage with singing or counting   Articulation: Dysarthria  Orientation: Not oriented to place, and time  EOM (CN III, IV, VI): Full/intact  Pupils (CN II, III): PERRL  Facial Sensation (CN V): Normal  Facial Movement (CN VII): Lower facial weakness on the right  Motor: Arm left  Normal 5/5  Leg left  Normal 5/5  Arm right  Paresis: 4+/5  Leg right Paresis: 4+/5  Tone: Normal tone throughout    Laboratory:  CMP:   No results for input(s): GLUCOSE, CALCIUM, ALBUMIN, PROT, NA, K, " CO2, CL, BUN, CREATININE, ALKPHOS, ALT, AST, BILITOT in the last 24 hours.  CBC:   Recent Labs   Lab 08/07/19  0518   WBC 6.25   RBC 3.36*   HGB 9.6*   HCT 31.3*   *   MCV 93   MCH 28.6   MCHC 30.7*     Lipid Panel:   No results for input(s): CHOL, LDLCALC, HDL, TRIG in the last 168 hours.  Coagulation: No results for input(s): PT, INR, APTT in the last 168 hours.  Platelet Aggregation Study: No results for input(s): PLTAGG, PLTAGINTERP, PLTAGREGLACO, ADPPLTAGGREG in the last 168 hours.  Hgb A1C:   No results for input(s): HGBA1C in the last 168 hours.  TSH:   No results for input(s): TSH in the last 168 hours.    Diagnostic Results     Brain Imaging   MRI Brain acute interventional protocol 7-28-19 results:      Acute infarct within the left postcentral gyrus of the parietal lobe.  No proximal arterial occlusion demonstrated on MRA.    Remote infarcts within the bilateral precentral gyri and left middle frontal gyrus, consistent with remote bilateral MCA and left TJ infarcts.    Vessel Imaging:  CTA head and neck multiphase 7-28-19 results:  Bilateral frontal lobe encephalomalacia, likely secondary to remote left TJ and bilateral anterior MCA distribution infarcts.    Multifocal areas of irregularity and narrowing involving the intracranial arteries, specifically the left TJ and bilateral M2 branches which may be related to remote insults/atherosclerosis.  No evidence of acute proximal/large vessel occlusion.  However, further evaluation with MRI is suggested for correlation and to exclude acute infarct.    Generalized cerebral volume loss and chronic microvascular ischemic changes.     Cardiac Imaging:  · Normal left ventricular systolic function. The estimated ejection fraction is 55%  · Normal LV diastolic function.  · Normal right ventricular systolic function.  · Normal central venous pressure (3 mm Hg).      CT head 7/28/19  Acute left anterior temporal zone of infarction again noted without  hemorrhagic conversion.  Multiple remote areas of infarction and encephalomalacia, as above.  Left ethmoid sinus disease with suspicion for fungal colonization.           Colin Jimenes MD  Comprehensive Stroke Center  Department of Vascular Neurology   [facility]

## 2019-08-09 NOTE — NURSING
Pt c/o rt arm/shoulder pain.  Call placed to on-call stroke MD Krishnamurthy.  Was advised okay to give PRN Tylenol for now.  Will medicate shortly.

## 2019-08-09 NOTE — ASSESSMENT & PLAN NOTE
63 y/o male with PMHx of alcohol and cocaine abuse L MCA syndrome received IV TPA at OSH and transferred to [facility] [place] care. CTA with no LVO. MRI with R MCA cortical infarct. TTE unremarkable, no LAE. Etiology likely arrhythmia secondary to drug abuse. Will need 30 day event monitor at discharge.    Antithrombotics: ASA 81 mg daily    Statins: Lipitor 40 mg daily    Aggressive risk factor modification: HTN     Rehab efforts: The patient has been evaluated by a stroke team provider and the therapy needs have been fully considered based off the presenting complaints and exam findings. The following therapy evaluations are needed: PT evaluate and treat, OT evaluate and treat, SLP evaluate and treat, PM&R evaluate for appropriate placement - SNF in [facility] or home with family if unable to place    Diagnostics ordered/pending: None     VTE prophylaxis: SCD's and heparin 5000 units sc Q8H     BP parameters: Goal -160

## 2019-08-09 NOTE — PLAN OF CARE
Per request of patient's daughter, Keily, sent to additional SN[facility] [place]. She states that her sister works there and there is an available bed.     08/09/19 1314   Post-Acute Status   Post-Acute Authorization Placement   Post-Acute Placement Status Referrals Sent

## 2019-08-09 NOTE — PLAN OF CARE
Problem: Adult Inpatient Plan of Care  Goal: Plan of Care Review  Patient remains free from injury or fall. No neuro changes noted from initial assessment. Ambulated to bathroom. Expressive aphasia still present. Plan= DC to SNF. Will continue to monitor.

## 2019-08-09 NOTE — PLAN OF CARE
Problem: Adult Inpatient Plan of Care  Goal: Plan of Care Review  Outcome: Ongoing (interventions implemented as appropriate)  POC reviewed with patient this shift.  Remains alert with aphasia.  Respirations unlabored.  Skin w/d.  Tolerated meds whole with water without difficulty.  Neuro status remains unchanged.  See flowsheet for full assessment.  No s/s of pain or discomfort at this time.

## 2019-08-09 NOTE — PLAN OF CARE
Referrals sent to [facility] per daughter request. Previous request for [facility] declined due to no male bed availability at this time.     08/09/19 7924   Post-Acute Status   Post-Acute Authorization Placement   Post-Acute Placement Status Referrals Sent

## 2019-08-09 NOTE — SUBJECTIVE & OBJECTIVE
"Neurologic Chief Complaint: left MCA - M2 occlusion    Subjective:     Interval History: Patient is seen for follow-up neurological assessment and treatment recommendations: No events overnight. Daughter open to possibility of caring for him at home if unable to be accepted at facility.     HPI, Past Medical, Family, and Social History remains the same as documented in the initial encounter.     Review of Systems   Constitutional: Negative for fever.   Respiratory: Negative for cough.    Cardiovascular: Negative for leg swelling.   Gastrointestinal: Negative for vomiting.   Neurological: Positive for facial asymmetry, speech difficulty and weakness.   Psychiatric/Behavioral: Negative for agitation.     Scheduled Meds:   aspirin  81 mg Oral Daily    atorvastatin  40 mg Oral Daily    heparin (porcine)  5,000 Units Subcutaneous Q8H    multivitamin  1 tablet Oral Daily    senna-docusate 8.6-50 mg  1 tablet Oral BID     Continuous Infusions:  PRN Meds:acetaminophen, hydrALAZINE, ondansetron, sodium chloride 0.9%    Objective:     Vital Signs (Most Recent):  Temp: 97 °F (36.1 °C) (08/09/19 0722)  Pulse: (!) 59 (08/09/19 0722)  Resp: 18 (08/09/19 0722)  BP: 109/66 (08/09/19 0722)  SpO2: 100 % (08/09/19 0722)  BP Location: Right arm    Vital Signs Range (Last 24H):  Temp:  [97 °F (36.1 °C)-98.2 °F (36.8 °C)]   Pulse:  [51-68]   Resp:  [18]   BP: (107-123)/(66-71)   SpO2:  [93 %-100 %]   BP Location: Right arm    Physical Exam   Constitutional: He appears well-developed and well-nourished. No distress.   HENT:   Head: Normocephalic and atraumatic.   Eyes: Pupils are equal, round, and reactive to light.   Cardiovascular: Normal rate.   Pulmonary/Chest: Effort normal. No respiratory distress.   Abdominal: He exhibits no distension.   Neurological: He is alert.   Skin: Skin is warm and dry.   Vitals reviewed.      Neurological Exam:   LOC: alert  Attention Span: poor  Language: global aphasia, says "I don't know" when " asked to follow commands. Not able to engage with singing or counting   Articulation: Dysarthria  Orientation: Not oriented to place, and time  EOM (CN III, IV, VI): Full/intact  Pupils (CN II, III): PERRL  Facial Sensation (CN V): Normal  Facial Movement (CN VII): Lower facial weakness on the right  Motor: Arm left  Normal 5/5  Leg left  Normal 5/5  Arm right  Paresis: 4+/5  Leg right Paresis: 4+/5  Tone: Normal tone throughout    Laboratory:  CMP:   No results for input(s): GLUCOSE, CALCIUM, ALBUMIN, PROT, NA, K, CO2, CL, BUN, CREATININE, ALKPHOS, ALT, AST, BILITOT in the last 24 hours.  CBC:   Recent Labs   Lab 08/07/19  0518   WBC 6.25   RBC 3.36*   HGB 9.6*   HCT 31.3*   *   MCV 93   MCH 28.6   MCHC 30.7*     Lipid Panel:   No results for input(s): CHOL, LDLCALC, HDL, TRIG in the last 168 hours.  Coagulation: No results for input(s): PT, INR, APTT in the last 168 hours.  Platelet Aggregation Study: No results for input(s): PLTAGG, PLTAGINTERP, PLTAGREGLACO, ADPPLTAGGREG in the last 168 hours.  Hgb A1C:   No results for input(s): HGBA1C in the last 168 hours.  TSH:   No results for input(s): TSH in the last 168 hours.    Diagnostic Results     Brain Imaging   MRI Brain acute interventional protocol 7-28-19 results:      Acute infarct within the left postcentral gyrus of the parietal lobe.  No proximal arterial occlusion demonstrated on MRA.    Remote infarcts within the bilateral precentral gyri and left middle frontal gyrus, consistent with remote bilateral MCA and left TJ infarcts.    Vessel Imaging:  CTA head and neck multiphase 7-28-19 results:  Bilateral frontal lobe encephalomalacia, likely secondary to remote left TJ and bilateral anterior MCA distribution infarcts.    Multifocal areas of irregularity and narrowing involving the intracranial arteries, specifically the left TJ and bilateral M2 branches which may be related to remote insults/atherosclerosis.  No evidence of acute proximal/large  vessel occlusion.  However, further evaluation with MRI is suggested for correlation and to exclude acute infarct.    Generalized cerebral volume loss and chronic microvascular ischemic changes.     Cardiac Imaging:  · Normal left ventricular systolic function. The estimated ejection fraction is 55%  · Normal LV diastolic function.  · Normal right ventricular systolic function.  · Normal central venous pressure (3 mm Hg).      CT head 7/28/19  Acute left anterior temporal zone of infarction again noted without hemorrhagic conversion.  Multiple remote areas of infarction and encephalomalacia, as above.  Left ethmoid sinus disease with suspicion for fungal colonization.

## 2019-08-10 PROCEDURE — 99233 SBSQ HOSP IP/OBS HIGH 50: CPT | Mod: GC,,, | Performed by: PSYCHIATRY & NEUROLOGY

## 2019-08-10 PROCEDURE — 25000003 PHARM REV CODE 250: Performed by: STUDENT IN AN ORGANIZED HEALTH CARE EDUCATION/TRAINING PROGRAM

## 2019-08-10 PROCEDURE — 99233 PR SUBSEQUENT HOSPITAL CARE,LEVL III: ICD-10-PCS | Mod: GC,,, | Performed by: PSYCHIATRY & NEUROLOGY

## 2019-08-10 PROCEDURE — 20600001 HC STEP DOWN PRIVATE ROOM

## 2019-08-10 PROCEDURE — 94761 N-INVAS EAR/PLS OXIMETRY MLT: CPT

## 2019-08-10 PROCEDURE — 25000003 PHARM REV CODE 250: Performed by: INTERNAL MEDICINE

## 2019-08-10 PROCEDURE — 63600175 PHARM REV CODE 636 W HCPCS: Performed by: INTERNAL MEDICINE

## 2019-08-10 RX ADMIN — HEPARIN SODIUM 5000 UNITS: 5000 INJECTION, SOLUTION INTRAVENOUS; SUBCUTANEOUS at 05:08

## 2019-08-10 RX ADMIN — THERA TABS 1 TABLET: TAB at 09:08

## 2019-08-10 RX ADMIN — HEPARIN SODIUM 5000 UNITS: 5000 INJECTION, SOLUTION INTRAVENOUS; SUBCUTANEOUS at 09:08

## 2019-08-10 RX ADMIN — SENNOSIDES,DOCUSATE SODIUM 1 TABLET: 8.6; 5 TABLET, FILM COATED ORAL at 08:08

## 2019-08-10 RX ADMIN — ASPIRIN 81 MG CHEWABLE TABLET 81 MG: 81 TABLET CHEWABLE at 09:08

## 2019-08-10 RX ADMIN — ATORVASTATIN CALCIUM 40 MG: 20 TABLET, FILM COATED ORAL at 09:08

## 2019-08-10 NOTE — SUBJECTIVE & OBJECTIVE
Neurologic Chief Complaint: left MCA - M2 occlusion    Subjective:     Interval History: Patient is seen for follow-up neurological assessment and treatment recommendations: No events overnight. Patient medically stable; case management working with family regarding discharge.  HPI, Past Medical, Family, and Social History remains the same as documented in the initial encounter.     Review of Systems   Constitutional: Negative for fever.   Respiratory: Negative for cough.    Cardiovascular: Negative for leg swelling.   Gastrointestinal: Negative for vomiting.   Neurological: Positive for facial asymmetry and speech difficulty.   Psychiatric/Behavioral: Negative for agitation and behavioral problems.     Scheduled Meds:   aspirin  81 mg Oral Daily    atorvastatin  40 mg Oral Daily    heparin (porcine)  5,000 Units Subcutaneous Q8H    multivitamin  1 tablet Oral Daily    senna-docusate 8.6-50 mg  1 tablet Oral BID     Continuous Infusions:  PRN Meds:acetaminophen, ondansetron, sodium chloride 0.9%    Objective:     Vital Signs (Most Recent):  Temp: 98 °F (36.7 °C) (08/10/19 0730)  Pulse: 60 (08/10/19 0730)  Resp: 18 (08/10/19 0730)  BP: 115/76 (08/10/19 0730)  SpO2: 98 % (08/10/19 0730)  BP Location: Right arm    Vital Signs Range (Last 24H):  Temp:  [96.6 °F (35.9 °C)-98.8 °F (37.1 °C)]   Pulse:  [60-75]   Resp:  [16-20]   BP: (112-126)/(62-76)   SpO2:  [95 %-99 %]   BP Location: Right arm    Physical Exam   Constitutional: He appears well-developed and well-nourished. No distress.   HENT:   Head: Normocephalic and atraumatic.   Eyes: Pupils are equal, round, and reactive to light. EOM are normal.   Cardiovascular: Normal rate.   Pulmonary/Chest: Effort normal. No respiratory distress.   Abdominal: Soft. He exhibits no distension.   Neurological: He is alert.   Skin: Skin is warm and dry.   Vitals reviewed.      Neurological Exam:   LOC: alert  Attention Span: poor  Language: global aphasia; Not able to engage  with singing or counting   Articulation: Dysarthria  Orientation: Not oriented to place, and time  EOM (CN III, IV, VI): Full/intact  Pupils (CN II, III): PERRL  Facial Sensation (CN V): Normal  Facial Movement (CN VII): Lower facial weakness on the right  Motor: Arm left  Normal 5/5  Leg left  Normal 5/5  Arm right  Paresis: 5/5  Leg right Paresis: 5/5  Tone: Normal tone throughout    Laboratory:  CMP:   No results for input(s): GLUCOSE, CALCIUM, ALBUMIN, PROT, NA, K, CO2, CL, BUN, CREATININE, ALKPHOS, ALT, AST, BILITOT in the last 24 hours.  CBC:   Recent Labs   Lab 08/07/19  0518   WBC 6.25   RBC 3.36*   HGB 9.6*   HCT 31.3*   *   MCV 93   MCH 28.6   MCHC 30.7*     Lipid Panel:   No results for input(s): CHOL, LDLCALC, HDL, TRIG in the last 168 hours.  Coagulation: No results for input(s): PT, INR, APTT in the last 168 hours.  Platelet Aggregation Study: No results for input(s): PLTAGG, PLTAGINTERP, PLTAGREGLACO, ADPPLTAGGREG in the last 168 hours.  Hgb A1C:   No results for input(s): HGBA1C in the last 168 hours.  TSH:   No results for input(s): TSH in the last 168 hours.    Diagnostic Results     Brain Imaging   MRI Brain acute interventional protocol 7-28-19 results:      Acute infarct within the left postcentral gyrus of the parietal lobe.  No proximal arterial occlusion demonstrated on MRA.    Remote infarcts within the bilateral precentral gyri and left middle frontal gyrus, consistent with remote bilateral MCA and left TJ infarcts.    Vessel Imaging:  CTA head and neck multiphase 7-28-19 results:  Bilateral frontal lobe encephalomalacia, likely secondary to remote left TJ and bilateral anterior MCA distribution infarcts.    Multifocal areas of irregularity and narrowing involving the intracranial arteries, specifically the left TJ and bilateral M2 branches which may be related to remote insults/atherosclerosis.  No evidence of acute proximal/large vessel occlusion.  However, further evaluation  with MRI is suggested for correlation and to exclude acute infarct.    Generalized cerebral volume loss and chronic microvascular ischemic changes.     Cardiac Imaging:  · Normal left ventricular systolic function. The estimated ejection fraction is 55%  · Normal LV diastolic function.  · Normal right ventricular systolic function.  · Normal central venous pressure (3 mm Hg).      CT head 7/28/19  Acute left anterior temporal zone of infarction again noted without hemorrhagic conversion.  Multiple remote areas of infarction and encephalomalacia, as above.  Left ethmoid sinus disease with suspicion for fungal colonization.

## 2019-08-10 NOTE — ASSESSMENT & PLAN NOTE
65 y/o male with PMHx of alcohol and cocaine abuse L MCA syndrome received IV TPA at OSH and transferred to [facility] for further care. CTA with no LVO. MRI with R MCA cortical infarct. TTE unremarkable, no LAE. Etiology likely arrhythmia secondary to drug abuse. Will need 30 day event monitor at discharge.    Patient medically stable    Antithrombotics: ASA 81 mg daily    Statins: Lipitor 40 mg daily    Aggressive risk factor modification: HTN     Rehab efforts: The patient has been evaluated by a stroke team provider and the therapy needs have been fully considered based off the presenting complaints and exam findings. The following therapy evaluations are needed: PT evaluate and treat, OT evaluate and treat, SLP evaluate and treat, PM&R evaluate for appropriate placement - SNF in [facility] or home with family if unable to place    Diagnostics ordered/pending: None     VTE prophylaxis: SCD's and heparin 5000 units sc Q8H     BP parameters: Goal -160

## 2019-08-10 NOTE — PROGRESS NOTES
[facility]  Vascular Neurology  Comprehensive Stroke Center  Progress Note    Assessment/Plan:     * Stroke due to embolism of left middle cerebral artery  65 y/o male with PMHx of alcohol and cocaine abuse L MCA syndrome received IV TPA at OSH and transferred to [facility] for further care. CTA with no LVO. MRI with R MCA cortical infarct. TTE unremarkable, no LAE. Etiology likely arrhythmia secondary to drug abuse. Will need 30 day event monitor at discharge.    Patient medically stable    Antithrombotics: ASA 81 mg daily    Statins: Lipitor 40 mg daily    Aggressive risk factor modification: HTN     Rehab efforts: The patient has been evaluated by a stroke team provider and the therapy needs have been fully considered based off the presenting complaints and exam findings. The following therapy evaluations are needed: PT evaluate and treat, OT evaluate and treat, SLP evaluate and treat, PM&R evaluate for appropriate placement - SNF in [place] or home with family if unable to place    Diagnostics ordered/pending: None     VTE prophylaxis: SCD's and heparin 5000 units sc Q8H     BP parameters: Goal -160        Drug abuse  Per daughter, history of drug abuse   Cocaine - use with sister and ex wifes nephew   Daughter would like to remove him from this situation and have him closer to her in [place]   Patient without signs or symptoms of withdrawal  No behavioral problems this admission    Explained and counseled on cessation of drugs although may not be fully appreciated due to aphasia    Cytotoxic cerebral edema  Area of cytotoxic cerebral edema identified when reviewing brain imaging in the territory of the L middle cerebral artery. There is not mass effect associated with it. We will continue to monitor the patients clinical exam for any worsening of symptoms which may indicate expansion of the stroke or the area of the edema resulting in the clinical change. The pattern is suggestive of  ESUS vs drug abuse etiology        Alcohol abuse  No signs of symptoms of withdrawl  Continue folic acid and thiamine  Will need counseling on cessation if aphasia improves    Essential hypertension  Stroke risk factor  SBP <160  BP at goal without antihypertensives    Aphasia  Due to stroke  Aggressive therapy    Received intravenous tissue plasminogen activator (t-PA) in emergency department  Completed close monitoring in [facility] 24 hours post administration   No complications          7/29/2019- Patient more alert, mild aphasia and dysarthria, TTE normal LA, no wall motion abnormalities, continue ASA and statin, rehab placement  7/30/19 - discussed care with older daughtercathi. Appears that patient is unable to safely return to prior home site due to drug abuse activity (cocaine- him and his family In the area). The daughter wishes to place him in a safer environment near her in [facility]  7/31/19 - no events, neurologically stable, working on placement vs 24 hour care  8/1 referrals sent to facilities, waiting acceptance  8/2/19- stable neuro exam. No events overnight. Education regarding drug abuse given and reinforced   8/5/2019- stable neuro exam. No events overnight.   8/7- spoke with daughter- will look into placement in facilities here in [place], but she is open to possibility of taking him home for care  8/8- will speak with daughter regarding possible D/C home tomorrow  8/10- patient medically stable; case management working with family regarding placement    STROKE DOCUMENTATION   Acute Stroke Times   Last Known Normal Date: 07/27/19  Last Known Normal Time: 2200  Symptom Onset Date: 07/27/19  Symptom Onset Time: 2200  Stroke Team Called Date: 07/27/19  Stroke Team Called Time: 2249  Stroke Team Arrival Date: 07/28/19  Stroke Team Arrival Time: 0145  CT Interpretation Time: 2251  Decision to Treat Time for Alteplase: 2320(bolus given)    NIH Scale:  1a. Level of Consciousness: 1-->Not alert,  but arousable by minor stimulation to obey, answer, or respond  1b. LOC Questions: 2-->Answers neither question correctly  1c. LOC Commands: 2-->Performs neither task correctly  2. Best Gaze: 0-->Normal  3. Visual: 0-->No visual loss  4. Facial Palsy: 1-->Minor paralysis (flattened nasolabial fold, asymmetry on smiling)  5a. Motor Arm, Left: 0-->No drift, limb holds 90 (or 45) degrees for full 10 secs  5b. Motor Arm, Right: 0-->No drift, limb holds 90 (or 45) degrees for full 10 secs  6a. Motor Leg, Left: 0-->No drift, leg holds 30 degree position for full 5 secs  6b. Motor Leg, Right: 0-->No drift, leg holds 30 degree position for full 5 secs  7. Limb Ataxia: 0-->Absent  8. Sensory: 0-->Normal, no sensory loss  9. Best Language: 2-->Severe aphasia, all communication is through fragmentary expression, great need for inference, questioning, and guessing by the listener. Range of information that can be exchanged is limited, listener carries burden of. . . (see row details)  10. Dysarthria: 2-->Severe dysarthria, patients speech is so slurred as to be unintelligible in the absence of or out of proportion to any dysphasia, or is mute/anarthric  11. Extinction and Inattention (formerly Neglect): 0-->No abnormality  Total (NIH Stroke Scale): 10       Modified Alyssa Score: 0  Rosalba Coma Scale:    ABCD2 Score:    [id number] Score:   HAS -BLED Score:   ICH Score:   Hunt & Barksdale Classification:      Hemorrhagic change of an Ischemic Stroke: Does this patient have an ischemic stroke with hemorrhagic changes? No     Neurologic Chief Complaint: left MCA - M2 occlusion    Subjective:     Interval History: Patient is seen for follow-up neurological assessment and treatment recommendations: No events overnight. Patient medically stable; case management working with family regarding discharge.  HPI, Past Medical, Family, and Social History remains the same as documented in the initial encounter.     Review of Systems    Constitutional: Negative for fever.   Respiratory: Negative for cough.    Cardiovascular: Negative for leg swelling.   Gastrointestinal: Negative for vomiting.   Neurological: Positive for facial asymmetry and speech difficulty.   Psychiatric/Behavioral: Negative for agitation and behavioral problems.     Scheduled Meds:   aspirin  81 mg Oral Daily    atorvastatin  40 mg Oral Daily    heparin (porcine)  5,000 Units Subcutaneous Q8H    multivitamin  1 tablet Oral Daily    senna-docusate 8.6-50 mg  1 tablet Oral BID     Continuous Infusions:  PRN Meds:acetaminophen, ondansetron, sodium chloride 0.9%    Objective:     Vital Signs (Most Recent):  Temp: 98 °F (36.7 °C) (08/10/19 0730)  Pulse: 60 (08/10/19 0730)  Resp: 18 (08/10/19 0730)  BP: 115/76 (08/10/19 0730)  SpO2: 98 % (08/10/19 0730)  BP Location: Right arm    Vital Signs Range (Last 24H):  Temp:  [96.6 °F (35.9 °C)-98.8 °F (37.1 °C)]   Pulse:  [60-75]   Resp:  [16-20]   BP: (112-126)/(62-76)   SpO2:  [95 %-99 %]   BP Location: Right arm    Physical Exam   Constitutional: He appears well-developed and well-nourished. No distress.   HENT:   Head: Normocephalic and atraumatic.   Eyes: Pupils are equal, round, and reactive to light. EOM are normal.   Cardiovascular: Normal rate.   Pulmonary/Chest: Effort normal. No respiratory distress.   Abdominal: Soft. He exhibits no distension.   Neurological: He is alert.   Skin: Skin is warm and dry.   Vitals reviewed.      Neurological Exam:   LOC: alert  Attention Span: poor  Language: global aphasia; Not able to engage with singing or counting   Articulation: Dysarthria  Orientation: Not oriented to place, and time  EOM (CN III, IV, VI): Full/intact  Pupils (CN II, III): PERRL  Facial Sensation (CN V): Normal  Facial Movement (CN VII): Lower facial weakness on the right  Motor: Arm left  Normal 5/5  Leg left  Normal 5/5  Arm right  Paresis:  5/5  Leg right Paresis:  5/5  Tone: Normal tone  throughout    Laboratory:  CMP:   No results for input(s): GLUCOSE, CALCIUM, ALBUMIN, PROT, NA, K, CO2, CL, BUN, CREATININE, ALKPHOS, ALT, AST, BILITOT in the last 24 hours.  CBC:   Recent Labs   Lab 08/07/19  0518   WBC 6.25   RBC 3.36*   HGB 9.6*   HCT 31.3*   *   MCV 93   MCH 28.6   MCHC 30.7*     Lipid Panel:   No results for input(s): CHOL, LDLCALC, HDL, TRIG in the last 168 hours.  Coagulation: No results for input(s): PT, INR, APTT in the last 168 hours.  Platelet Aggregation Study: No results for input(s): PLTAGG, PLTAGINTERP, PLTAGREGLACO, ADPPLTAGGREG in the last 168 hours.  Hgb A1C:   No results for input(s): HGBA1C in the last 168 hours.  TSH:   No results for input(s): TSH in the last 168 hours.    Diagnostic Results     Brain Imaging   MRI Brain acute interventional protocol 7-28-19 results:      Acute infarct within the left postcentral gyrus of the parietal lobe.  No proximal arterial occlusion demonstrated on MRA.    Remote infarcts within the bilateral precentral gyri and left middle frontal gyrus, consistent with remote bilateral MCA and left TJ infarcts.    Vessel Imaging:  CTA head and neck multiphase 7-28-19 results:  Bilateral frontal lobe encephalomalacia, likely secondary to remote left JT and bilateral anterior MCA distribution infarcts.    Multifocal areas of irregularity and narrowing involving the intracranial arteries, specifically the left TJ and bilateral M2 branches which may be related to remote insults/atherosclerosis.  No evidence of acute proximal/large vessel occlusion.  However, further evaluation with MRI is suggested for correlation and to exclude acute infarct.    Generalized cerebral volume loss and chronic microvascular ischemic changes.     Cardiac Imaging:  · Normal left ventricular systolic function. The estimated ejection fraction is 55%  · Normal LV diastolic function.  · Normal right ventricular systolic function.  · Normal central venous pressure (3 mm  Hg).      CT head 7/28/19  Acute left anterior temporal zone of infarction again noted without hemorrhagic conversion.  Multiple remote areas of infarction and encephalomalacia, as above.  Left ethmoid sinus disease with suspicion for fungal colonization.           Colin Jimenes MD  Comprehensive Stroke Center  Department of Vascular Neurology   [facility]

## 2019-08-10 NOTE — PLAN OF CARE
Problem: Adult Inpatient Plan of Care  Goal: Plan of Care Review  Outcome: Ongoing (interventions implemented as appropriate)  POC reviewed with pt.  Aphasic.  Able to answer yes/no appropriately.  No acute events.  No falls.  Slept well.  No c/o pain/discomfort. VS monitored.  Safety maintained.  Call light within easy reach and will continue to monitor.

## 2019-08-11 LAB
ANION GAP SERPL CALC-SCNC: 7 MMOL/L (ref 8–16)
BASOPHILS # BLD AUTO: 0.03 K/UL (ref 0–0.2)
BASOPHILS NFR BLD: 0.5 % (ref 0–1.9)
BUN SERPL-MCNC: 15 MG/DL (ref 8–23)
CALCIUM SERPL-MCNC: 9.2 MG/DL (ref 8.7–10.5)
CHLORIDE SERPL-SCNC: 106 MMOL/L (ref 95–110)
CO2 SERPL-SCNC: 25 MMOL/L (ref 23–29)
CREAT SERPL-MCNC: 0.8 MG/DL (ref 0.5–1.4)
DIFFERENTIAL METHOD: ABNORMAL
EOSINOPHIL # BLD AUTO: 0.3 K/UL (ref 0–0.5)
EOSINOPHIL NFR BLD: 4.9 % (ref 0–8)
ERYTHROCYTE [DISTWIDTH] IN BLOOD BY AUTOMATED COUNT: 14.2 % (ref 11.5–14.5)
EST. GFR  (AFRICAN AMERICAN): >60 ML/MIN/1.73 M^2
EST. GFR  (NON AFRICAN AMERICAN): >60 ML/MIN/1.73 M^2
GLUCOSE SERPL-MCNC: 102 MG/DL (ref 70–110)
HCT VFR BLD AUTO: 31.1 % (ref 40–54)
HGB BLD-MCNC: 9.5 G/DL (ref 14–18)
IMM GRANULOCYTES # BLD AUTO: 0.05 K/UL (ref 0–0.04)
IMM GRANULOCYTES NFR BLD AUTO: 0.8 % (ref 0–0.5)
LYMPHOCYTES # BLD AUTO: 2.5 K/UL (ref 1–4.8)
LYMPHOCYTES NFR BLD: 40 % (ref 18–48)
MCH RBC QN AUTO: 28.3 PG (ref 27–31)
MCHC RBC AUTO-ENTMCNC: 30.5 G/DL (ref 32–36)
MCV RBC AUTO: 93 FL (ref 82–98)
MONOCYTES # BLD AUTO: 0.7 K/UL (ref 0.3–1)
MONOCYTES NFR BLD: 11.1 % (ref 4–15)
NEUTROPHILS # BLD AUTO: 2.6 K/UL (ref 1.8–7.7)
NEUTROPHILS NFR BLD: 42.7 % (ref 38–73)
NRBC BLD-RTO: 0 /100 WBC
PLATELET # BLD AUTO: 363 K/UL (ref 150–350)
PMV BLD AUTO: 10.6 FL (ref 9.2–12.9)
POTASSIUM SERPL-SCNC: 4.2 MMOL/L (ref 3.5–5.1)
RBC # BLD AUTO: 3.36 M/UL (ref 4.6–6.2)
SODIUM SERPL-SCNC: 138 MMOL/L (ref 136–145)
WBC # BLD AUTO: 6.15 K/UL (ref 3.9–12.7)

## 2019-08-11 PROCEDURE — 36415 COLL VENOUS BLD VENIPUNCTURE: CPT

## 2019-08-11 PROCEDURE — 99233 SBSQ HOSP IP/OBS HIGH 50: CPT | Mod: GC,,, | Performed by: PSYCHIATRY & NEUROLOGY

## 2019-08-11 PROCEDURE — 20600001 HC STEP DOWN PRIVATE ROOM

## 2019-08-11 PROCEDURE — 63600175 PHARM REV CODE 636 W HCPCS: Performed by: INTERNAL MEDICINE

## 2019-08-11 PROCEDURE — 80048 BASIC METABOLIC PNL TOTAL CA: CPT

## 2019-08-11 PROCEDURE — 99233 PR SUBSEQUENT HOSPITAL CARE,LEVL III: ICD-10-PCS | Mod: GC,,, | Performed by: PSYCHIATRY & NEUROLOGY

## 2019-08-11 PROCEDURE — 94761 N-INVAS EAR/PLS OXIMETRY MLT: CPT

## 2019-08-11 PROCEDURE — 25000003 PHARM REV CODE 250: Performed by: STUDENT IN AN ORGANIZED HEALTH CARE EDUCATION/TRAINING PROGRAM

## 2019-08-11 PROCEDURE — 25000003 PHARM REV CODE 250: Performed by: INTERNAL MEDICINE

## 2019-08-11 PROCEDURE — 85025 COMPLETE CBC W/AUTO DIFF WBC: CPT

## 2019-08-11 RX ADMIN — HEPARIN SODIUM 5000 UNITS: 5000 INJECTION, SOLUTION INTRAVENOUS; SUBCUTANEOUS at 09:08

## 2019-08-11 RX ADMIN — SENNOSIDES,DOCUSATE SODIUM 1 TABLET: 8.6; 5 TABLET, FILM COATED ORAL at 10:08

## 2019-08-11 RX ADMIN — ATORVASTATIN CALCIUM 40 MG: 20 TABLET, FILM COATED ORAL at 10:08

## 2019-08-11 RX ADMIN — ASPIRIN 81 MG CHEWABLE TABLET 81 MG: 81 TABLET CHEWABLE at 10:08

## 2019-08-11 RX ADMIN — THERA TABS 1 TABLET: TAB at 10:08

## 2019-08-11 RX ADMIN — HEPARIN SODIUM 5000 UNITS: 5000 INJECTION, SOLUTION INTRAVENOUS; SUBCUTANEOUS at 04:08

## 2019-08-11 RX ADMIN — SENNOSIDES,DOCUSATE SODIUM 1 TABLET: 8.6; 5 TABLET, FILM COATED ORAL at 09:08

## 2019-08-11 RX ADMIN — HEPARIN SODIUM 5000 UNITS: 5000 INJECTION, SOLUTION INTRAVENOUS; SUBCUTANEOUS at 05:08

## 2019-08-11 NOTE — PROGRESS NOTES
[facility]  Vascular Neurology  Comprehensive Stroke Center  Progress Note    Assessment/Plan:     * Stroke due to embolism of left middle cerebral artery  65 y/o male with PMHx of alcohol and cocaine abuse L MCA syndrome received IV TPA at OSH and transferred to [facility] for further care. CTA with no LVO. MRI with R MCA cortical infarct. TTE unremarkable, no LAE. Etiology likely arrhythmia secondary to drug abuse. Will need 30 day event monitor at discharge.    Patient medically stable    Antithrombotics: ASA 81 mg daily    Statins: Lipitor 40 mg daily    Aggressive risk factor modification: HTN     Rehab efforts: The patient has been evaluated by a stroke team provider and the therapy needs have been fully considered based off the presenting complaints and exam findings. The following therapy evaluations are needed: PT evaluate and treat, OT evaluate and treat, SLP evaluate and treat, PM&R evaluate for appropriate placement - SNF in mobile [place] or home with family if unable to place    Diagnostics ordered/pending: None     VTE prophylaxis: SCD's and heparin 5000 units sc Q8H     BP parameters: Goal -160        Drug abuse  Per daughter, history of drug abuse   Cocaine - use with sister and ex wifes nephew   Daughter would like to remove him from this situation and have him closer to her in [place]   Patient without signs or symptoms of withdrawal  No behavioral problems this admission    Explained and counseled on cessation of drugs although may not be fully appreciated due to aphasia    Cytotoxic cerebral edema  Area of cytotoxic cerebral edema identified when reviewing brain imaging in the territory of the L middle cerebral artery. There is not mass effect associated with it. We will continue to monitor the patients clinical exam for any worsening of symptoms which may indicate expansion of the stroke or the area of the edema resulting in the clinical change. The pattern is suggestive of  ESUS vs drug abuse etiology        Alcohol abuse  No signs of symptoms of withdrawl  Continue folic acid and thiamine  Will need counseling on cessation if aphasia improves    Essential hypertension  Stroke risk factor  SBP <160  BP at goal without antihypertensives    Aphasia  Due to stroke  Aggressive therapy    Received intravenous tissue plasminogen activator (t-PA) in emergency department  Completed close monitoring in NCC 24 hours post administration   No complications          7/29/2019- Patient more alert, mild aphasia and dysarthria, TTE normal LA, no wall motion abnormalities, continue ASA and statin, rehab placement  7/30/19 - discussed care with older daughtercathi. Appears that patient is unable to safely return to prior home site due to drug abuse activity (cocaine- him and his family In the area). The daughter wishes to place him in a safer environment near her in [place]  7/31/19 - no events, neurologically stable, working on placement vs 24 hour care  8/1 referrals sent to facilities, waiting acceptance  8/2/19- stable neuro exam. No events overnight. Education regarding drug abuse given and reinforced   8/5/2019- stable neuro exam. No events overnight.   8/7- spoke with daughter- will look into placement in facilities here in [place], but she is open to possibility of taking him home for care  8/8- will speak with daughter regarding possible D/C home tomorrow  8/10- patient medically stable; difficulty following commands; case management working with family regarding placement    STROKE DOCUMENTATION   Acute Stroke Times   Last Known Normal Date: 07/27/19  Last Known Normal Time: 2200  Symptom Onset Date: 07/27/19  Symptom Onset Time: 2200  Stroke Team Called Date: 07/27/19  Stroke Team Called Time: 2249  Stroke Team Arrival Date: 07/28/19  Stroke Team Arrival Time: 0145  CT Interpretation Time: 2251  Decision to Treat Time for Alteplase: 2320(bolus given)    NIH Scale:  1a. Level of  Consciousness: 0-->Alert, keenly responsive  1b. LOC Questions: 2-->Answers neither question correctly  1c. LOC Commands: 2-->Performs neither task correctly  2. Best Gaze: 0-->Normal  3. Visual: 0-->No visual loss  4. Facial Palsy: 1-->Minor paralysis (flattened nasolabial fold, asymmetry on smiling)  5a. Motor Arm, Left: 0-->No drift, limb holds 90 (or 45) degrees for full 10 secs  5b. Motor Arm, Right: 0-->No drift, limb holds 90 (or 45) degrees for full 10 secs  6a. Motor Leg, Left: 0-->No drift, leg holds 30 degree position for full 5 secs  6b. Motor Leg, Right: 0-->No drift, leg holds 30 degree position for full 5 secs  7. Limb Ataxia: 0-->Absent  8. Sensory: 0-->Normal, no sensory loss  9. Best Language: 2-->Severe aphasia, all communication is through fragmentary expression, great need for inference, questioning, and guessing by the listener. Range of information that can be exchanged is limited, listener carries burden of. . . (see row details)  10. Dysarthria: 2-->Severe dysarthria, patients speech is so slurred as to be unintelligible in the absence of or out of proportion to any dysphasia, or is mute/anarthric  11. Extinction and Inattention (formerly Neglect): 0-->No abnormality  Total (NIH Stroke Scale): 9       Modified [place] Score: 0  Rosalba Coma Scale:    ABCD2 Score:    [id number] Score:   HAS -BLED Score:   ICH Score:   Hunt & Barksdale Classification:      Hemorrhagic change of an Ischemic Stroke: Does this patient have an ischemic stroke with hemorrhagic changes? No     Neurologic Chief Complaint: left MCA - M2 occlusion    Subjective:     Interval History: Patient is seen for follow-up neurological assessment and treatment recommendations: No events overnight. Patient medically stable; case management working with family regarding discharge.    HPI, Past Medical, Family, and Social History remains the same as documented in the initial encounter.     Review of Systems   Constitutional: Negative  for fever.   Respiratory: Negative for cough.    Cardiovascular: Negative for leg swelling.   Gastrointestinal: Negative for vomiting.   Neurological: Positive for facial asymmetry and speech difficulty.   Psychiatric/Behavioral: Negative for agitation and behavioral problems.     Scheduled Meds:   aspirin  81 mg Oral Daily    atorvastatin  40 mg Oral Daily    heparin (porcine)  5,000 Units Subcutaneous Q8H    multivitamin  1 tablet Oral Daily    senna-docusate 8.6-50 mg  1 tablet Oral BID     Continuous Infusions:  PRN Meds:acetaminophen, ondansetron, sodium chloride 0.9%    Objective:     Vital Signs (Most Recent):  Temp: 97.8 °F (36.6 °C) (08/11/19 1122)  Pulse: 60 (08/11/19 1122)  Resp: 18 (08/11/19 1122)  BP: 109/63 (08/11/19 1122)  SpO2: 97 % (08/11/19 1122)  BP Location: Right arm    Vital Signs Range (Last 24H):  Temp:  [96.5 °F (35.8 °C)-98.7 °F (37.1 °C)]   Pulse:  [57-71]   Resp:  [17-18]   BP: (102-132)/(60-79)   SpO2:  [96 %-100 %]   BP Location: Right arm    Physical Exam   Constitutional: He appears well-developed and well-nourished. No distress.   HENT:   Head: Normocephalic and atraumatic.   Eyes: Pupils are equal, round, and reactive to light. EOM are normal.   Cardiovascular: Normal rate.   Pulmonary/Chest: Effort normal. No respiratory distress.   Abdominal: Soft. He exhibits no distension.   Neurological: He is alert.   Skin: Skin is warm and dry.   Vitals reviewed.      Neurological Exam:   LOC: alert  Attention Span: poor  Language: global aphasia; Not able to engage with singing or counting   Articulation: Dysarthria  Orientation: Not oriented to place, and time  EOM (CN III, IV, VI): Full/intact  Pupils (CN II, III): PERRL  Facial Sensation (CN V): Normal  Facial Movement (CN VII): Lower facial weakness on the right  Motor: Arm left  Normal 5/5  Leg left  Normal 5/5  Arm right  Paresis: 5/5  Leg right Paresis: 5/5  Tone: Normal tone throughout    Laboratory:  CMP:   Recent Labs   Lab  08/11/19  0034   CALCIUM 9.2      K 4.2   CO2 25      BUN 15   CREATININE 0.8     CBC:   Recent Labs   Lab 08/11/19  0034   WBC 6.15   RBC 3.36*   HGB 9.5*   HCT 31.1*   *   MCV 93   MCH 28.3   MCHC 30.5*     Lipid Panel:   No results for input(s): CHOL, LDLCALC, HDL, TRIG in the last 168 hours.  Coagulation: No results for input(s): PT, INR, APTT in the last 168 hours.  Platelet Aggregation Study: No results for input(s): PLTAGG, PLTAGINTERP, PLTAGREGLACO, ADPPLTAGGREG in the last 168 hours.  Hgb A1C:   No results for input(s): HGBA1C in the last 168 hours.  TSH:   No results for input(s): TSH in the last 168 hours.    Diagnostic Results     Brain Imaging   MRI Brain acute interventional protocol 7-28-19 results:      Acute infarct within the left postcentral gyrus of the parietal lobe.  No proximal arterial occlusion demonstrated on MRA.    Remote infarcts within the bilateral precentral gyri and left middle frontal gyrus, consistent with remote bilateral MCA and left TJ infarcts.    Vessel Imaging:  CTA head and neck multiphase 7-28-19 results:  Bilateral frontal lobe encephalomalacia, likely secondary to remote left TJ and bilateral anterior MCA distribution infarcts.    Multifocal areas of irregularity and narrowing involving the intracranial arteries, specifically the left TJ and bilateral M2 branches which may be related to remote insults/atherosclerosis.  No evidence of acute proximal/large vessel occlusion.  However, further evaluation with MRI is suggested for correlation and to exclude acute infarct.    Generalized cerebral volume loss and chronic microvascular ischemic changes.     Cardiac Imaging:  · Normal left ventricular systolic function. The estimated ejection fraction is 55%  · Normal LV diastolic function.  · Normal right ventricular systolic function.  · Normal central venous pressure (3 mm Hg).      CT head 7/28/19  Acute left anterior temporal zone of infarction again  noted without hemorrhagic conversion.  Multiple remote areas of infarction and encephalomalacia, as above.  Left ethmoid sinus disease with suspicion for fungal colonization.           Colin Jimenes MD  Comprehensive Stroke Center  Department of Vascular Neurology   [facility]

## 2019-08-11 NOTE — SUBJECTIVE & OBJECTIVE
Neurologic Chief Complaint: left MCA - M2 occlusion    Subjective:     Interval History: Patient is seen for follow-up neurological assessment and treatment recommendations: No events overnight. Patient medically stable; case management working with family regarding discharge.    HPI, Past Medical, Family, and Social History remains the same as documented in the initial encounter.     Review of Systems   Constitutional: Negative for fever.   Respiratory: Negative for cough.    Cardiovascular: Negative for leg swelling.   Gastrointestinal: Negative for vomiting.   Neurological: Positive for facial asymmetry and speech difficulty.   Psychiatric/Behavioral: Negative for agitation and behavioral problems.     Scheduled Meds:   aspirin  81 mg Oral Daily    atorvastatin  40 mg Oral Daily    heparin (porcine)  5,000 Units Subcutaneous Q8H    multivitamin  1 tablet Oral Daily    senna-docusate 8.6-50 mg  1 tablet Oral BID     Continuous Infusions:  PRN Meds:acetaminophen, ondansetron, sodium chloride 0.9%    Objective:     Vital Signs (Most Recent):  Temp: 97.8 °F (36.6 °C) (08/11/19 1122)  Pulse: 60 (08/11/19 1122)  Resp: 18 (08/11/19 1122)  BP: 109/63 (08/11/19 1122)  SpO2: 97 % (08/11/19 1122)  BP Location: Right arm    Vital Signs Range (Last 24H):  Temp:  [96.5 °F (35.8 °C)-98.7 °F (37.1 °C)]   Pulse:  [57-71]   Resp:  [17-18]   BP: (102-132)/(60-79)   SpO2:  [96 %-100 %]   BP Location: Right arm    Physical Exam   Constitutional: He appears well-developed and well-nourished. No distress.   HENT:   Head: Normocephalic and atraumatic.   Eyes: Pupils are equal, round, and reactive to light. EOM are normal.   Cardiovascular: Normal rate.   Pulmonary/Chest: Effort normal. No respiratory distress.   Abdominal: Soft. He exhibits no distension.   Neurological: He is alert.   Skin: Skin is warm and dry.   Vitals reviewed.      Neurological Exam:   LOC: alert  Attention Span: poor  Language: global aphasia; Not able to  engage with singing or counting   Articulation: Dysarthria  Orientation: Not oriented to place, and time  EOM (CN III, IV, VI): Full/intact  Pupils (CN II, III): PERRL  Facial Sensation (CN V): Normal  Facial Movement (CN VII): Lower facial weakness on the right  Motor: Arm left  Normal 5/5  Leg left  Normal 5/5  Arm right  Paresis: 5/5  Leg right Paresis: 5/5  Tone: Normal tone throughout    Laboratory:  CMP:   Recent Labs   Lab 08/11/19  0034   CALCIUM 9.2      K 4.2   CO2 25      BUN 15   CREATININE 0.8     CBC:   Recent Labs   Lab 08/11/19 0034   WBC 6.15   RBC 3.36*   HGB 9.5*   HCT 31.1*   *   MCV 93   MCH 28.3   MCHC 30.5*     Lipid Panel:   No results for input(s): CHOL, LDLCALC, HDL, TRIG in the last 168 hours.  Coagulation: No results for input(s): PT, INR, APTT in the last 168 hours.  Platelet Aggregation Study: No results for input(s): PLTAGG, PLTAGINTERP, PLTAGREGLACO, ADPPLTAGGREG in the last 168 hours.  Hgb A1C:   No results for input(s): HGBA1C in the last 168 hours.  TSH:   No results for input(s): TSH in the last 168 hours.    Diagnostic Results     Brain Imaging   MRI Brain acute interventional protocol 7-28-19 results:      Acute infarct within the left postcentral gyrus of the parietal lobe.  No proximal arterial occlusion demonstrated on MRA.    Remote infarcts within the bilateral precentral gyri and left middle frontal gyrus, consistent with remote bilateral MCA and left TJ infarcts.    Vessel Imaging:  CTA head and neck multiphase 7-28-19 results:  Bilateral frontal lobe encephalomalacia, likely secondary to remote left TJ and bilateral anterior MCA distribution infarcts.    Multifocal areas of irregularity and narrowing involving the intracranial arteries, specifically the left TJ and bilateral M2 branches which may be related to remote insults/atherosclerosis.  No evidence of acute proximal/large vessel occlusion.  However, further evaluation with MRI is suggested  for correlation and to exclude acute infarct.    Generalized cerebral volume loss and chronic microvascular ischemic changes.     Cardiac Imaging:  · Normal left ventricular systolic function. The estimated ejection fraction is 55%  · Normal LV diastolic function.  · Normal right ventricular systolic function.  · Normal central venous pressure (3 mm Hg).      CT head 7/28/19  Acute left anterior temporal zone of infarction again noted without hemorrhagic conversion.  Multiple remote areas of infarction and encephalomalacia, as above.  Left ethmoid sinus disease with suspicion for fungal colonization.

## 2019-08-11 NOTE — PLAN OF CARE
Problem: Adult Inpatient Plan of Care  Goal: Plan of Care Review  Outcome: Ongoing (interventions implemented as appropriate)  POC reviewed with pt at bedside.  Alert.  Remains aphasic.  No acute events this shift.  VS monitored..  Ambulatory.  Monitored and no falls.  Safety maintained. Bed alarm activated and call light within easy reach.

## 2019-08-11 NOTE — ASSESSMENT & PLAN NOTE
65 y/o male with PMHx of alcohol and cocaine abuse L MCA syndrome received IV TPA at OSH and transferred to [facility] for further care. CTA with no LVO. MRI with R MCA cortical infarct. TTE unremarkable, no LAE. Etiology likely arrhythmia secondary to drug abuse. Will need 30 day event monitor at discharge.    Patient medically stable    Antithrombotics: ASA 81 mg daily    Statins: Lipitor 40 mg daily    Aggressive risk factor modification: HTN     Rehab efforts: The patient has been evaluated by a stroke team provider and the therapy needs have been fully considered based off the presenting complaints and exam findings. The following therapy evaluations are needed: PT evaluate and treat, OT evaluate and treat, SLP evaluate and treat, PM&R evaluate for appropriate placement - SNF in [facility] or home with family if unable to place    Diagnostics ordered/pending: None     VTE prophylaxis: SCD's and heparin 5000 units sc Q8H     BP parameters: Goal -160

## 2019-08-12 VITALS
SYSTOLIC BLOOD PRESSURE: 124 MMHG | DIASTOLIC BLOOD PRESSURE: 77 MMHG | HEART RATE: 63 BPM | TEMPERATURE: 98 F | BODY MASS INDEX: 22.09 KG/M2 | OXYGEN SATURATION: 98 % | RESPIRATION RATE: 17 BRPM | HEIGHT: 70 IN | WEIGHT: 154.31 LBS

## 2019-08-12 PROCEDURE — 97116 GAIT TRAINING THERAPY: CPT

## 2019-08-12 PROCEDURE — 99233 SBSQ HOSP IP/OBS HIGH 50: CPT | Mod: ,,, | Performed by: PSYCHIATRY & NEUROLOGY

## 2019-08-12 PROCEDURE — 63600175 PHARM REV CODE 636 W HCPCS: Performed by: INTERNAL MEDICINE

## 2019-08-12 PROCEDURE — 25000003 PHARM REV CODE 250: Performed by: STUDENT IN AN ORGANIZED HEALTH CARE EDUCATION/TRAINING PROGRAM

## 2019-08-12 PROCEDURE — 97535 SELF CARE MNGMENT TRAINING: CPT

## 2019-08-12 PROCEDURE — 99233 PR SUBSEQUENT HOSPITAL CARE,LEVL III: ICD-10-PCS | Mod: ,,, | Performed by: PSYCHIATRY & NEUROLOGY

## 2019-08-12 PROCEDURE — 25000003 PHARM REV CODE 250: Performed by: INTERNAL MEDICINE

## 2019-08-12 PROCEDURE — 92507 TX SP LANG VOICE COMM INDIV: CPT

## 2019-08-12 RX ORDER — NAPROXEN SODIUM 220 MG/1
81 TABLET, FILM COATED ORAL DAILY
Refills: 0 | COMMUNITY
Start: 2019-08-13 | End: 2020-08-12

## 2019-08-12 RX ORDER — ATORVASTATIN CALCIUM 40 MG/1
40 TABLET, FILM COATED ORAL DAILY
Qty: 90 TABLET | Refills: 0 | Status: SHIPPED | OUTPATIENT
Start: 2019-08-13 | End: 2020-08-12

## 2019-08-12 RX ADMIN — HEPARIN SODIUM 5000 UNITS: 5000 INJECTION, SOLUTION INTRAVENOUS; SUBCUTANEOUS at 02:08

## 2019-08-12 RX ADMIN — HEPARIN SODIUM 5000 UNITS: 5000 INJECTION, SOLUTION INTRAVENOUS; SUBCUTANEOUS at 05:08

## 2019-08-12 RX ADMIN — THERA TABS 1 TABLET: TAB at 08:08

## 2019-08-12 RX ADMIN — ATORVASTATIN CALCIUM 40 MG: 20 TABLET, FILM COATED ORAL at 08:08

## 2019-08-12 RX ADMIN — ASPIRIN 81 MG CHEWABLE TABLET 81 MG: 81 TABLET CHEWABLE at 08:08

## 2019-08-12 RX ADMIN — SENNOSIDES,DOCUSATE SODIUM 1 TABLET: 8.6; 5 TABLET, FILM COATED ORAL at 08:08

## 2019-08-12 NOTE — PLAN OF CARE
Problem: Physical Therapy Goal  Goal: Physical Therapy Goal    Goals to be met by 8/10/2019    1. Pt will perform rolling to the R and L with independence- MET.   2. Pt will perform supine to sit from both sides of the bed with independence- MET.  3. Pt will perform sit to supine with independence- MET.  4. Pt will perform sit to stand transfers with independence.    5. Pt will perform bed <> chair transfers with independence.  6. Pt will perform gait x 400 feet with independence while performing dynamic gait activities      Outcome: Ongoing (interventions implemented as appropriate)  goals remain appropriate

## 2019-08-12 NOTE — PLAN OF CARE
08/12/2019      Ayden Kincaid  [address] [address]          Vascular Neurology Dept.  [facility] [address] [place] [address] [phone] after hours   Diagnosis:  Stroke due to embolism of left middle cerebral artery                            Please allow patient's eldest daughter (Keily Kincaid) to make all financial and medical decision for . Ayden Kincaid. Patient is unable to make decisions for himself due to his global aphasia from his stroke.            __________________________  Yessy Friedman PA-C  08/12/2019

## 2019-08-12 NOTE — PLAN OF CARE
Problem: Adult Inpatient Plan of Care  Goal: Plan of Care Review  Outcome: Ongoing (interventions implemented as appropriate)  POC reviewed at bedside.  Remains aphasic.  No complains of pain.  No falls.  No changes in neuro status.  Calm and cooperative with care.  VSS.  Safety maintained.

## 2019-08-12 NOTE — SUBJECTIVE & OBJECTIVE
Neurologic Chief Complaint: left MCA - M2 occlusion    Subjective:     Interval History: Patient is seen for follow-up neurological assessment and treatment recommendations: CJ, remains aphasic    HPI, Past Medical, Family, and Social History remains the same as documented in the initial encounter.     Review of Systems   Constitutional: Negative for chills and fever.   Respiratory: Negative for cough.    Gastrointestinal: Negative for vomiting.   Neurological: Positive for facial asymmetry and speech difficulty.   Psychiatric/Behavioral: Negative for agitation and behavioral problems.     Scheduled Meds:   aspirin  81 mg Oral Daily    atorvastatin  40 mg Oral Daily    heparin (porcine)  5,000 Units Subcutaneous Q8H    multivitamin  1 tablet Oral Daily    senna-docusate 8.6-50 mg  1 tablet Oral BID     Continuous Infusions:  PRN Meds:acetaminophen, ondansetron, sodium chloride 0.9%    Objective:     Vital Signs (Most Recent):  Temp: 98 °F (36.7 °C) (08/12/19 1158)  Pulse: 60 (08/12/19 1158)  Resp: 17 (08/12/19 1158)  BP: 120/64 (08/12/19 1158)  SpO2: 97 % (08/12/19 1158)  BP Location: Right arm    Vital Signs Range (Last 24H):  Temp:  [96 °F (35.6 °C)-98 °F (36.7 °C)]   Pulse:  [60-72]   Resp:  [17-18]   BP: (106-135)/(64-81)   SpO2:  [93 %-100 %]   BP Location: Right arm    Physical Exam   Constitutional: He appears well-developed and well-nourished. No distress.   HENT:   Head: Normocephalic and atraumatic.   Eyes: Pupils are equal, round, and reactive to light. EOM are normal.   Cardiovascular: Normal rate.   Pulmonary/Chest: Effort normal. No respiratory distress.   Neurological: He is alert.   Skin: Skin is warm and dry.   Vitals reviewed.      Neurological Exam:   LOC: alert  Attention Span: poor  Language: global aphasia  Articulation: Dysarthria  Orientation: Not oriented to place, and time  EOM (CN III, IV, VI): Full/intact  Pupils (CN II, III): PERRL  Facial Sensation (CN V): Normal  Facial  Movement (CN VII): Lower facial weakness on the right  Motor: Arm left  Normal 5/5  Leg left  Normal 5/5  Arm right  Paresis: 5/5  Leg right Paresis: 5/5  Tone: Normal tone throughout    Laboratory:  CMP:   No results for input(s): GLUCOSE, CALCIUM, ALBUMIN, PROT, NA, K, CO2, CL, BUN, CREATININE, ALKPHOS, ALT, AST, BILITOT in the last 24 hours.  CBC:   Recent Labs   Lab 08/11/19  0034   WBC 6.15   RBC 3.36*   HGB 9.5*   HCT 31.1*   *   MCV 93   MCH 28.3   MCHC 30.5*     Lipid Panel:   No results for input(s): CHOL, LDLCALC, HDL, TRIG in the last 168 hours.  Coagulation: No results for input(s): PT, INR, APTT in the last 168 hours.  Platelet Aggregation Study: No results for input(s): PLTAGG, PLTAGINTERP, PLTAGREGLACO, ADPPLTAGGREG in the last 168 hours.  Hgb A1C:   No results for input(s): HGBA1C in the last 168 hours.  TSH:   No results for input(s): TSH in the last 168 hours.    Diagnostic Results     Brain Imaging   MRI Brain acute interventional protocol 7-28-19 results:      Acute infarct within the left postcentral gyrus of the parietal lobe.  No proximal arterial occlusion demonstrated on MRA.    Remote infarcts within the bilateral precentral gyri and left middle frontal gyrus, consistent with remote bilateral MCA and left TJ infarcts.    Vessel Imaging:  CTA head and neck multiphase 7-28-19 results:  Bilateral frontal lobe encephalomalacia, likely secondary to remote left TJ and bilateral anterior MCA distribution infarcts.    Multifocal areas of irregularity and narrowing involving the intracranial arteries, specifically the left TJ and bilateral M2 branches which may be related to remote insults/atherosclerosis.  No evidence of acute proximal/large vessel occlusion.  However, further evaluation with MRI is suggested for correlation and to exclude acute infarct.    Generalized cerebral volume loss and chronic microvascular ischemic changes.     Cardiac Imaging:  · Normal left ventricular systolic  function. The estimated ejection fraction is 55%  · Normal LV diastolic function.  · Normal right ventricular systolic function.  · Normal central venous pressure (3 mm Hg).      CT head 7/28/19  Acute left anterior temporal zone of infarction again noted without hemorrhagic conversion.  Multiple remote areas of infarction and encephalomalacia, as above.  Left ethmoid sinus disease with suspicion for fungal colonization.

## 2019-08-12 NOTE — PLAN OF CARE
Problem: Occupational Therapy Goal  Goal: Occupational Therapy Goal  Goals to be met by: 8/10     Patient will increase functional independence with ADLs by performing:    UE Dressing with Stand-by Assistance.  LE Dressing with Supervision.  Grooming while standing with Stand-by Assistance.  Toileting from toilet with Stand-by Assistance for hygiene and clothing management.   Supine to sit with Supervision.  Stand pivot transfers with Supervision.  Pt will follow 3/4 one step commands.     Outcome: Ongoing (interventions implemented as appropriate)  Continue OT POC     Comments: Jae Ibrahim OTR/L  8/12/2019

## 2019-08-12 NOTE — PLAN OF CARE
[facility]    HOME HEALTH ORDERS  FACE TO FACE ENCOUNTER    Patient Name: Ayden Kincaid  YOB: 1955    PCP: Gabrielle Ureña MD   PCP Address: [address] [place], [place] [id number]  PCP Phone Number: [phone]  PCP Fax: [phone]    Encounter Date: 08/12/2019    Admit to Home Health    Diagnoses:  Active Hospital Problems    Diagnosis  POA    *Stroke due to embolism of left middle cerebral artery [I63.412]  Yes     Priority: 1 - High    Drug abuse [F19.10]  Yes    Moderate malnutrition [E44.0]  Unknown    Aphasia [R47.01]  Yes    Essential hypertension [I10]  Unknown    Alcohol abuse [F10.10]  Yes    Cytotoxic cerebral edema [G93.6]  Yes    Received intravenous tissue plasminogen activator (t-PA) in emergency department [Z92.82]  Not Applicable      Resolved Hospital Problems   No resolved problems to display.       No future appointments.  Follow-up Information     Gabrielle Ureña MD.    Specialty:  Family Medicine  Why:  Outpatient Services  Contact information:  [address] [place] [address]  [phone]                     I have seen and examined this patient face to face today. My clinical findings that support the need for the home health skilled services and home bound status are the following:  Weakness/numbness causing balance and gait disturbance due to Stroke making it taxing to leave home.  Mental confusion making it unsafe for patient to leave home alone due to  global aphasia.    Allergies:Review of patient's allergies indicates:  No Known Allergies    Diet: dental soft, thin liquids    Activities: activity as tolerated    Nursing:   SN to complete comprehensive assessment including routine vital signs. Instruct on disease process and s/s of complications to report to MD. Review/verify medication list sent home with the patient at time of discharge  and instruct patient/caregiver as needed. Frequency may be adjusted depending on start of  care date.    Notify MD if SBP > 160 or < 90; DBP > 90 or < 50; HR > 120 or < 50; Temp > 101; Other:         CONSULTS:    Physical Therapy to evaluate and treat. Evaluate for home safety and equipment needs; Establish/upgrade home exercise program. Perform / instruct on therapeutic exercises, gait training, transfer training, and Range of Motion.  Occupational Therapy to evaluate and treat. Evaluate home environment for safety and equipment needs. Perform/Instruct on transfers, ADL training, ROM, and therapeutic exercises.  Speech Therapy  to evaluate and treat for  Language, Swallowing and Cognition.    MISCELLANEOUS CARE:  N/A    WOUND CARE ORDERS  n/a      Medications: Review discharge medications with patient and family and provide education.      Current Discharge Medication List      START taking these medications    Details   aspirin 81 MG Chew Take 1 tablet (81 mg total) by mouth once daily.  Refills: 0      atorvastatin (LIPITOR) 40 MG tablet Take 1 tablet (40 mg total) by mouth once daily.  Qty: 90 tablet, Refills: 0      multivitamin (THERAGRAN) tablet Take 1 tablet by mouth once daily.             I certify that this patient is confined to his home and needs physical therapy, speech therapy and occupational therapy.        Yessy Friedman PA-C  Vascular Neurology  [phone]

## 2019-08-12 NOTE — PT/OT/SLP PROGRESS
Physical Therapy Treatment    Patient Name:  Ayden Kincaid   MRN:  [id number]    Recommendations:     Discharge Recommendations:  nursing facility, skilled   Discharge Equipment Recommendations: (TBD)   Barriers to discharge: Inaccessible home and Decreased caregiver support    Assessment:     Ayden Kincaid is a 64 y.o. male admitted with a medical diagnosis of Stroke due to embolism of left middle cerebral artery.  He presents with the following impairments/functional limitations:  weakness, impaired endurance, impaired self care skills, impaired functional mobilty, gait instability, impaired balance, decreased coordination, decreased upper extremity function, decreased lower extremity function, decreased safety awareness Patient tolerated treatment well focusing on bed mobility, transfers and gait. Patient will continue to improve with skilled physical therapy services in order to return to functional baseline..    Rehab Prognosis: Good; patient would benefit from acute skilled PT services to address these deficits and reach maximum level of function.    Recent Surgery: * No surgery found *      Plan:     During this hospitalization, patient to be seen 2 x/week to address the identified rehab impairments via gait training, therapeutic activities, therapeutic exercises, neuromuscular re-education and progress toward the following goals:    · Plan of Care Expires:  08/28/19    Subjective     Chief Complaint: no c/o  Patient/Family Comments/goals: no c/o  Pain/Comfort:  · Pain Rating 1: 0/10  · Pain Rating Post-Intervention 1: 0/10      Objective:     Communicated with nursing prior to session.  Patient found HOB elevated with bed alarm, telemetry upon PT entry to room.     General Precautions: Standard, aphasia   Orthopedic Precautions:N/A   Braces: N/A     Functional Mobility:  · Bed Mobility:     · Rolling Right: independence  · Supine to Sit: independence  · Sit to Supine: independence  · Transfers:     · Sit to  Stand:  supervision with no AD  · Gait: 612' no AD SBA lateral instability especially as patient fatigues      AM-PAC 6 CLICK MOBILITY  Turning over in bed (including adjusting bedclothes, sheets and blankets)?: 4  Sitting down on and standing up from a chair with arms (e.g., wheelchair, bedside commode, etc.): 4  Moving from lying on back to sitting on the side of the bed?: 4  Moving to and from a bed to a chair (including a wheelchair)?: 4  Need to walk in hospital room?: 3  Climbing 3-5 steps with a railing?: 3  Basic Mobility Total Score: 22       Therapeutic Activities and Exercises:   patient educated on importance of increased time out of bed throughout the day    Patient left HOB elevated with call button in reach..    GOALS:   Multidisciplinary Problems     Physical Therapy Goals        Problem: Physical Therapy Goal    Goal Priority Disciplines Outcome Goal Variances Interventions   Physical Therapy Goal     PT, PT/OT Ongoing (interventions implemented as appropriate)     Description:    Goals to be met by 8/10/2019    1. Pt will perform rolling to the R and L with independence- MET.   2. Pt will perform supine to sit from both sides of the bed with independence- MET.  3. Pt will perform sit to supine with independence- MET.  4. Pt will perform sit to stand transfers with independence.    5. Pt will perform bed <> chair transfers with independence.  6. Pt will perform gait x 400 feet with independence while performing dynamic gait activities                       Time Tracking:     PT Received On: 08/12/19  PT Start Time: 0855     PT Stop Time: 0907  PT Total Time (min): 12 min     Billable Minutes: Gait Training 12    Treatment Type: Treatment  PT/PTA: PTA     PTA Visit Number: 2     Misty Ortiz PTA  08/12/2019

## 2019-08-12 NOTE — PT/OT/SLP PROGRESS
Occupational Therapy   Treatment    Name: Ayden Kincaid  MRN: [id number]  Admitting Diagnosis:  Stroke due to embolism of left middle cerebral artery       Recommendations:     Discharge Recommendations: nursing facility, skilled  Discharge Equipment Recommendations:  (tbd)  Barriers to discharge:  (pt is homeless)    Assessment:     Ayden Kincaid is a 64 y.o. male with a medical diagnosis of Stroke due to embolism of left middle cerebral artery.  He presents with impairments listed below. Pt did well to tolerate and participate in session. Pt presents w/ impaired command following throughout session. Pt did well to tolerate and participate in session. Pt displayed global deconditioning requiring increased assist for ADLs and mobility at this time. Pt would benefit from skilled OT services to improve independence and overall occupational functioning.     Performance deficits affecting function are weakness, impaired endurance, impaired self care skills, impaired functional mobilty, gait instability, impaired balance, decreased lower extremity function, decreased safety awareness, decreased upper extremity function, decreased coordination, impaired coordination, impaired fine motor.     Rehab Prognosis:  Fair; patient would benefit from acute skilled OT services to address these deficits and reach maximum level of function.       Plan:     Patient to be seen 2 x/week to address the above listed problems via self-care/home management, therapeutic activities, therapeutic exercises, neuromuscular re-education  · Plan of Care Expires: 08/29/19  · Plan of Care Reviewed with: patient    Subjective     Pain/Comfort:  · Pain Rating 1: 0/10  · Pain Rating Post-Intervention 1: 0/10    Objective:     Communicated with: RN prior to session.  Patient found HOB elevated with   upon OT entry to room.    General Precautions: Standard, aphasia   Orthopedic Precautions:N/A   Braces: N/A     Pt able to follow single command <50% of the  time.     Occupational Performance:     Bed Mobility:    · Patient completed Scooting/Bridging with stand by assistance  · Patient completed Supine to Sit with stand by assistance  · Patient completed Sit to Supine with stand by assistance     Functional Mobility/Transfers:  · Patient completed Sit <> Stand Transfer with contact guard assistance  with  no assistive device   · Patient completed Toilet Transfer Step Transfer technique with contact guard assistance with  no AD  · Functional Mobility: Pt ambulated ~130 ft at sba w/ HHA.     Activities of Daily Living:  · Grooming: maximal assistance pt required max a verbal and tactile cues to complete hand hygiene while standing at the sink   · Upper Body Dressing: moderate assistance donned gown as robe  · Lower Body Dressing: minimum assistance donned and doffed socks while seated EOB      AMPAC 6 Click ADL: 18    Treatment & Education:  Pt educated on POC.     Patient left HOB elevated with all lines intact and call button in reachEducation:      GOALS:   Multidisciplinary Problems     Occupational Therapy Goals        Problem: Occupational Therapy Goal    Goal Priority Disciplines Outcome Interventions   Occupational Therapy Goal     OT, PT/OT Ongoing (interventions implemented as appropriate)    Description:  Goals to be met by: 8/10     Patient will increase functional independence with ADLs by performing:    UE Dressing with Stand-by Assistance.  LE Dressing with Supervision.  Grooming while standing with Stand-by Assistance.  Toileting from toilet with Stand-by Assistance for hygiene and clothing management.   Supine to sit with Supervision.  Stand pivot transfers with Supervision.  Pt will follow 3/4 one step commands.                      Time Tracking:     OT Date of Treatment: 08/12/19  OT Start Time: 1535  OT Stop Time: 1543  OT Total Time (min): 8 min    Billable Minutes:Self Care/Home Management 8 minutes    Jae Ibrahim, BRITTANY  8/12/2019

## 2019-08-12 NOTE — PLAN OF CARE
08/12/19 1541   Discharge Reassessment   Assessment Type Discharge Planning Reassessment   Discharge Plan A Home with family   Discharge Plan B Home Health

## 2019-08-12 NOTE — PLAN OF CARE
Problem: SLP Goal  Goal: SLP Goal  Speech Language Pathology Goals  Goals expected to be met by 8/12:   1. Pt tolerate soft diet with thin liquids and no overt s/s of aspiration.  2. Pt will answer simple y/n questions with 60% accy, via any modality, with mod cues.  3. Pt will model simple commands with 40% accy given max cues.  4. Pt will elicit intelligible words x5 during auto speech tasks, given max cues.   5. Pt will id objects in field of 2 with 60% accy.           Pt. progressing towards goals  Soo Wayne MA/[facility]-SLP  Speech Language Pathologist  Pager [phone]  8/12/2019

## 2019-08-12 NOTE — PLAN OF CARE
Problem: Adult Inpatient Plan of Care  Goal: Plan of Care Review  Outcome: Ongoing (interventions implemented as appropriate)  POC and stroke education pamphlet was reviewed with patient.  Patient nods appropriately for understanding.  Reviewed and signed stoke pamphet was left at bedside.  No acute changes.  Deny of needs.  Please see flowsheet for full assessment.  Patient is sitting in locked recliner with chair alarm activated.  Call light is within reach.  [facility]

## 2019-08-12 NOTE — PROGRESS NOTES
[facility]  Vascular Neurology  Comprehensive Stroke Center  Progress Note    Assessment/Plan:     * Stroke due to embolism of left middle cerebral artery  65 y/o male with PMHx of alcohol and cocaine abuse L MCA syndrome received IV TPA at OSH and transferred to [facility] for further care. CTA with no LVO. MRI with R MCA cortical infarct. TTE unremarkable, no LAE. Etiology likely arrhythmia secondary to drug abuse. Will need 30 day event monitor at discharge.    Patient medically stable    Antithrombotics: ASA 81 mg daily    Statins: Lipitor 40 mg daily    Aggressive risk factor modification: HTN     Rehab efforts: The patient has been evaluated by a stroke team provider and the therapy needs have been fully considered based off the presenting complaints and exam findings. The following therapy evaluations are needed: PT evaluate and treat, OT evaluate and treat, SLP evaluate and treat, PM&R evaluate for appropriate placement - SNF in [place] or home with family if unable to place    Diagnostics ordered/pending: None     VTE prophylaxis: SCD's and heparin 5000 units sc Q8H     BP parameters: Goal -160        Drug abuse  Per daughter, history of drug abuse   Cocaine - use with sister and ex wifes nephew   Daughter would like to remove him from this situation and have him closer to her in [place]   Patient without signs or symptoms of withdrawal  No behavioral problems this admission    Explained and counseled on cessation of drugs although may not be fully appreciated due to aphasia    Cytotoxic cerebral edema  Area of cytotoxic cerebral edema identified when reviewing brain imaging in the territory of the L middle cerebral artery. There is not mass effect associated with it. We will continue to monitor the patients clinical exam for any worsening of symptoms which may indicate expansion of the stroke or the area of the edema resulting in the clinical change. The pattern is suggestive of  ESUS vs drug abuse etiology        Alcohol abuse  No signs of symptoms of withdrawl  Continue folic acid and thiamine  Will need counseling on cessation if aphasia improves    Essential hypertension  Stroke risk factor  SBP <140  BP at goal without antihypertensives    Aphasia  Due to stroke  Aggressive therapy    Received intravenous tissue plasminogen activator (t-PA) in emergency department  Completed close monitoring in [facility] 24 hours post administration   No complications          7/29/2019- Patient more alert, mild aphasia and dysarthria, TTE normal LA, no wall motion abnormalities, continue ASA and statin, rehab placement  7/30/19 - discussed care with older daughtercathi. Appears that patient is unable to safely return to prior home site due to drug abuse activity (cocaine- him and his family In the area). The daughter wishes to place him in a safer environment near her in [facility] [place]  7/31/19 - no events, neurologically stable, working on placement vs 24 hour care  8/1 referrals sent to facilities, waiting acceptance  8/2/19- stable neuro exam. No events overnight. Education regarding drug abuse given and reinforced   8/5/2019- stable neuro exam. No events overnight.   8/7- spoke with daughter- will look into placement in facilities here in [place], but she is open to possibility of taking him home for care  8/8- will speak with daughter regarding possible D/C home tomorrow  8/10- patient medically stable; difficulty following commands; case management working with family regarding placement    STROKE DOCUMENTATION   Acute Stroke Times   Last Known Normal Date: 07/27/19  Last Known Normal Time: 2200  Symptom Onset Date: 07/27/19  Symptom Onset Time: 2200  Stroke Team Called Date: 07/27/19  Stroke Team Called Time: 2249  Stroke Team Arrival Date: 07/28/19  Stroke Team Arrival Time: 0145  CT Interpretation Time: 2251  Decision to Treat Time for Alteplase: 2320(bolus given)    NIH Scale:  1a. Level of  Consciousness: 0-->Alert, keenly responsive  1b. LOC Questions: 2-->Answers neither question correctly  1c. LOC Commands: 2-->Performs neither task correctly  2. Best Gaze: 0-->Normal  3. Visual: 0-->No visual loss  4. Facial Palsy: 1-->Minor paralysis (flattened nasolabial fold, asymmetry on smiling)  5a. Motor Arm, Left: 0-->No drift, limb holds 90 (or 45) degrees for full 10 secs  5b. Motor Arm, Right: 0-->No drift, limb holds 90 (or 45) degrees for full 10 secs  6a. Motor Leg, Left: 0-->No drift, leg holds 30 degree position for full 5 secs  6b. Motor Leg, Right: 0-->No drift, leg holds 30 degree position for full 5 secs  7. Limb Ataxia: 0-->Absent  8. Sensory: 0-->Normal, no sensory loss  9. Best Language: 2-->Severe aphasia, all communication is through fragmentary expression, great need for inference, questioning, and guessing by the listener. Range of information that can be exchanged is limited, listener carries burden of. . . (see row details)  10. Dysarthria: 2-->Severe dysarthria, patients speech is so slurred as to be unintelligible in the absence of or out of proportion to any dysphasia, or is mute/anarthric  11. Extinction and Inattention (formerly Neglect): 0-->No abnormality  Total (NIH Stroke Scale): 9       Modified [place] Score: 0  [place] Coma Scale:    ABCD2 Score:    [id number] Score:   HAS -BLED Score:   ICH Score:   Hunt & Barksdale Classification:      Hemorrhagic change of an Ischemic Stroke: Does this patient have an ischemic stroke with hemorrhagic changes? No     Neurologic Chief Complaint: left MCA - M2 occlusion    Subjective:     Interval History: Patient is seen for follow-up neurological assessment and treatment recommendations: ARABELLAASHAON, remains aphasic    HPI, Past Medical, Family, and Social History remains the same as documented in the initial encounter.     Review of Systems   Constitutional: Negative for chills and fever.   Respiratory: Negative for cough.    Gastrointestinal:  Negative for vomiting.   Neurological: Positive for facial asymmetry and speech difficulty.   Psychiatric/Behavioral: Negative for agitation and behavioral problems.     Scheduled Meds:   aspirin  81 mg Oral Daily    atorvastatin  40 mg Oral Daily    heparin (porcine)  5,000 Units Subcutaneous Q8H    multivitamin  1 tablet Oral Daily    senna-docusate 8.6-50 mg  1 tablet Oral BID     Continuous Infusions:  PRN Meds:acetaminophen, ondansetron, sodium chloride 0.9%    Objective:     Vital Signs (Most Recent):  Temp: 98 °F (36.7 °C) (08/12/19 1158)  Pulse: 60 (08/12/19 1158)  Resp: 17 (08/12/19 1158)  BP: 120/64 (08/12/19 1158)  SpO2: 97 % (08/12/19 1158)  BP Location: Right arm    Vital Signs Range (Last 24H):  Temp:  [96 °F (35.6 °C)-98 °F (36.7 °C)]   Pulse:  [60-72]   Resp:  [17-18]   BP: (106-135)/(64-81)   SpO2:  [93 %-100 %]   BP Location: Right arm    Physical Exam   Constitutional: He appears well-developed and well-nourished. No distress.   HENT:   Head: Normocephalic and atraumatic.   Eyes: Pupils are equal, round, and reactive to light. EOM are normal.   Cardiovascular: Normal rate.   Pulmonary/Chest: Effort normal. No respiratory distress.   Neurological: He is alert.   Skin: Skin is warm and dry.   Vitals reviewed.      Neurological Exam:   LOC: alert  Attention Span: poor  Language: global aphasia  Articulation: Dysarthria  Orientation: Not oriented to place, and time  EOM (CN III, IV, VI): Full/intact  Pupils (CN II, III): PERRL  Facial Sensation (CN V): Normal  Facial Movement (CN VII): Lower facial weakness on the right  Motor: Arm left  Normal 5/5  Leg left  Normal 5/5  Arm right  Paresis: 5/5  Leg right Paresis: 5/5  Tone: Normal tone throughout    Laboratory:  CMP:   No results for input(s): GLUCOSE, CALCIUM, ALBUMIN, PROT, NA, K, CO2, CL, BUN, CREATININE, ALKPHOS, ALT, AST, BILITOT in the last 24 hours.  CBC:   Recent Labs   Lab 08/11/19  0034   WBC 6.15   RBC 3.36*   HGB 9.5*   HCT 31.1*    *   MCV 93   MCH 28.3   MCHC 30.5*     Lipid Panel:   No results for input(s): CHOL, LDLCALC, HDL, TRIG in the last 168 hours.  Coagulation: No results for input(s): PT, INR, APTT in the last 168 hours.  Platelet Aggregation Study: No results for input(s): PLTAGG, PLTAGINTERP, PLTAGREGLACO, ADPPLTAGGREG in the last 168 hours.  Hgb A1C:   No results for input(s): HGBA1C in the last 168 hours.  TSH:   No results for input(s): TSH in the last 168 hours.    Diagnostic Results     Brain Imaging   MRI Brain acute interventional protocol 7-28-19 results:      Acute infarct within the left postcentral gyrus of the parietal lobe.  No proximal arterial occlusion demonstrated on MRA.    Remote infarcts within the bilateral precentral gyri and left middle frontal gyrus, consistent with remote bilateral MCA and left TJ infarcts.    Vessel Imaging:  CTA head and neck multiphase 7-28-19 results:  Bilateral frontal lobe encephalomalacia, likely secondary to remote left TJ and bilateral anterior MCA distribution infarcts.    Multifocal areas of irregularity and narrowing involving the intracranial arteries, specifically the left TJ and bilateral M2 branches which may be related to remote insults/atherosclerosis.  No evidence of acute proximal/large vessel occlusion.  However, further evaluation with MRI is suggested for correlation and to exclude acute infarct.    Generalized cerebral volume loss and chronic microvascular ischemic changes.     Cardiac Imaging:  · Normal left ventricular systolic function. The estimated ejection fraction is 55%  · Normal LV diastolic function.  · Normal right ventricular systolic function.  · Normal central venous pressure (3 mm Hg).      CT head 7/28/19  Acute left anterior temporal zone of infarction again noted without hemorrhagic conversion.  Multiple remote areas of infarction and encephalomalacia, as above.  Left ethmoid sinus disease with suspicion for fungal colonization.                Yessy Friedman PA-C  Comprehensive Stroke Center  Department of Vascular Neurology   [facility]

## 2019-08-12 NOTE — PLAN OF CARE
Spoke to [facility], they have declined to accept patient due to past drug history and pt being recently incarcerated. Notified team.      08/12/19 4432   Post-Acute Status   Post-Acute Authorization Placement   Post-Acute Placement Status Discharge Plan Changed

## 2019-08-12 NOTE — PT/OT/SLP PROGRESS
"Speech Language Pathology Treatment    Patient Name:  Ayden Kincaid   MRN:  [id number]  Admitting Diagnosis: Stroke due to embolism of left middle cerebral artery    Recommendations:                 General Recommendations:  Speech/language therapy  Diet recommendations:  Dental Soft, Liquid Diet Level: Thin   Aspiration Precautions: Standard aspiration precautions   General Precautions: Standard, aphasia  Communication strategies:  none    Subjective     No family present  Patient goals: home    Pain/Comfort:  · Pain Rating 1: 0/10  · Pain Rating Post-Intervention 1: 0/10    Objective:     Has the patient been evaluated by SLP for swallowing?   Yes  Keep patient NPO? No   Current Respiratory Status: room air      Pt seen bedside with no family present. Pt with severe dysarthria but pt grossly approximating " want to go home". Occasional clear words noted such as " fabiano" and " yea". Pt He was able to grossly approximate counting however responses to automatic sentence completion tasks were unintelligible.  He responded to simple y/n questions with 30% acc. Responses were often undifferentiated and needed frequent cues to nod yes or no. Education provided re poc.    Assessment:     Ayden Kincaid is a 64 y.o. male with an SLP diagnosis of Aphasia and Apraxia.    Goals:   Multidisciplinary Problems     SLP Goals        Problem: SLP Goal    Goal Priority Disciplines Outcome   SLP Goal     SLP Ongoing (interventions implemented as appropriate)   Description:  Speech Language Pathology Goals  Goals expected to be met by 8/19:   1. Pt tolerate soft diet with thin liquids and no overt s/s of aspiration.  2. Pt will answer simple y/n questions with 60% accy, via any modality, with mod cues.  3. Pt will model simple commands with 40% accy given max cues.  4. Pt will elicit intelligible words x5 during auto speech tasks, given max cues.   5. Pt will id objects in field of 2 with 60% accy.                             Plan: "     · Patient to be seen:  4 x/week   · Plan of Care expires:  08/28/19  · Plan of Care reviewed with:  patient   · SLP Follow-Up:  Yes       Discharge recommendations:  nursing facility, skilled   Barriers to Discharge:  Level of Skilled Assistance Needed 24 hour    Time Tracking:     SLP Treatment Date:   08/12/19  Speech Start Time:  1050  Speech Stop Time:  1105     Speech Total Time (min):  15 min    Billable Minutes: Speech Therapy Individual 15    Soo Wayne MA, CCC-SLP  08/12/2019

## 2019-08-13 NOTE — DISCHARGE SUMMARY
[facility]  Vascular Neurology  Comprehensive Stroke Center  Discharge Summary     Summary:     Admit Date: 7/28/2019  1:43 AM    Discharge Date and Time:  08/13/2019 4:00 PM    Attending Physician: Tavo Tian MD    Discharge Provider: Yessy Friedman PA-C    History of Present Illness: 63 y/o amle who was last seen normal around 2200 by neighbours found to have aphasia, right sided weakness. He was taken to [facility] where stroke code was called and telemedicine was done with Dr Parra and with no acute process seen on CT scan and no contraindication recommendation was to give IV TPA and transfer to [facility]. No family with patient and no histroy in medical records.   Upon arrival CTA head and neck multiphase done and reveals multiple old infarcts as well as questionable M2 occlusion acute vs chronic will need MRI acte intervention procotol.   Patient exam is inconsistent not following commands, aphasic, moving right side at times and others lets fall.   Likely etiology of stroke is arrhythmia in the setting of drug abuse.    Hospital Course (synopsis of major diagnoses, care, treatment, and services provided during the course of the hospital stay): Ayden Kincaid is a 64 y.o. male with PMHx alcohol abuse, and cocaine abuse who was admitted for L MCA infarct and treated with tPA. CTA without LVO therefore patient was not a candidate for thrombectomy. Echo with EF of 55% and no LA enlargement. Etiology currently ESUS vs drug abuse.  He was evaluated by PT/OT and SLP who recommended a dental soft diet with thin liquids and SNF placement. Patient had no signs or symptoms or behavior problems this admission related to drug or alcohol withdrawal.     Patient remained medically stable for discharge for quite some time. Discharge delayed due to SNF placement. Unable to find an accepting facility for patient due to his history of drug abuse. Patient was discharged home with  family and home health. For secondary stroke prevention, patient will continue aspirin 81 mg daily and atorvastatin 40 mg daily. 30 day event monitor will be mailed to patients home due to etiology of ESUS. Patient was discharged home with family and home health. He will follow up in stroke clinic in 4-6 weeks.           7/29/2019- Patient more alert, mild aphasia and dysarthria, TTE normal LA, no wall motion abnormalities, continue ASA and statin, rehab placement  7/30/19 - discussed care with older daughter, cathi. Appears that patient is unable to safely return to prior home site due to drug abuse activity (cocaine- him and his family In the area). The daughter wishes to place him in a safer environment near her in [facility]  7/31/19 - no events, neurologically stable, working on placement vs 24 hour care  8/1 referrals sent to facilities, waiting acceptance  8/2/19- stable neuro exam. No events overnight. Education regarding drug abuse given and reinforced   8/5/2019- stable neuro exam. No events overnight.   8/7- spoke with daughter- will look into placement in facilities here in [place], but she is open to possibility of taking him home for care  8/8- will speak with daughter regarding possible D/C home tomorrow  8/10- patient medically stable; difficulty following commands; case management working with family regarding placement    Stroke Etiology: Undetermined Cause. Unclassified due to presence of more than or equal to 1 Probable Major Cause (CE (cardio-aortic embolism)) ESUS vs drug abuse    STROKE DOCUMENTATION   Acute Stroke Times   Last Known Normal Date: 07/27/19  Last Known Normal Time: 2200  Symptom Onset Date: 07/27/19  Symptom Onset Time: 2200  Stroke Team Called Date: 07/27/19  Stroke Team Called Time: 2249  Stroke Team Arrival Date: 07/28/19  Stroke Team Arrival Time: 0145  CT Interpretation Time: 2251  Decision to Treat Time for Alteplase: 2320(bolus given)     NIH Scale:  Interval: 7  days or at discharge (whichever comes first)  1a. Level of Consciousness: 0-->Alert, keenly responsive  1b. LOC Questions: 2-->Answers neither question correctly  1c. LOC Commands: 2-->Performs neither task correctly  2. Best Gaze: 0-->Normal  3. Visual: 0-->No visual loss  4. Facial Palsy: 1-->Minor paralysis (flattened nasolabial fold, asymmetry on smiling)  5a. Motor Arm, Left: 0-->No drift, limb holds 90 (or 45) degrees for full 10 secs  5b. Motor Arm, Right: 0-->No drift, limb holds 90 (or 45) degrees for full 10 secs  6a. Motor Leg, Left: 0-->No drift, leg holds 30 degree position for full 5 secs  6b. Motor Leg, Right: 0-->No drift, leg holds 30 degree position for full 5 secs  7. Limb Ataxia: 0-->Absent  8. Sensory: 0-->Normal, no sensory loss  9. Best Language: 2-->Severe aphasia, all communication is through fragmentary expression, great need for inference, questioning, and guessing by the listener. Range of information that can be exchanged is limited, listener carries burden of. . . (see row details)  10. Dysarthria: 2-->Severe dysarthria, patients speech is so slurred as to be unintelligible in the absence of or out of proportion to any dysphasia, or is mute/anarthric  11. Extinction and Inattention (formerly Neglect): 0-->No abnormality  Total (NIH Stroke Scale): 9        Modified Sullivan Score: 3  Rosalba Coma Scale:    ABCD2 Score:    [id number] Score:   HAS -BLED Score:   ICH Score:   Hunt & Barksdale Classification:       Assessment/Plan:     Diagnostic Results:      Brain Imaging     CT Head. Date: 07/28/19  Acute left anterior temporal zone of infarction again noted without hemorrhagic conversion.    Multiple remote areas of infarction and encephalomalacia, as above.    Left ethmoid sinus disease with suspicion for fungal colonization.      MRI Brain acute interventional protocol 7-28-19 results:       Acute infarct within the left postcentral gyrus of the parietal lobe.  No proximal arterial  occlusion demonstrated on MRA.    Remote infarcts within the bilateral precentral gyri and left middle frontal gyrus, consistent with remote bilateral MCA and left TJ infarcts.     Vessel Imaging:  CTA head and neck multiphase 7-28-19 results:  Bilateral frontal lobe encephalomalacia, likely secondary to remote left TJ and bilateral anterior MCA distribution infarcts.    Multifocal areas of irregularity and narrowing involving the intracranial arteries, specifically the left TJ and bilateral M2 branches which may be related to remote insults/atherosclerosis.  No evidence of acute proximal/large vessel occlusion.  However, further evaluation with MRI is suggested for correlation and to exclude acute infarct.    Generalized cerebral volume loss and chronic microvascular ischemic changes.     Cardiac Imaging:  · Normal left ventricular systolic function. The estimated ejection fraction is 55%  · Normal LV diastolic function.  · Normal right ventricular systolic function.  · Normal central venous pressure (3 mm Hg).        CT head 7/28/19  Acute left anterior temporal zone of infarction again noted without hemorrhagic conversion.  Multiple remote areas of infarction and encephalomalacia, as above.  Left ethmoid sinus disease with suspicion for fungal colonization.    Interventions: IV t-PA    Complications: None    Disposition: Home-Health Care [facility]    Final Active Diagnoses:    Diagnosis Date Noted POA    PRINCIPAL PROBLEM:  Stroke due to embolism of left middle cerebral artery [I63.412] 07/27/2019 Yes    Drug abuse [F19.10] 07/30/2019 Yes    Moderate malnutrition [E44.0] 07/29/2019 Unknown    Aphasia [R47.01] 07/28/2019 Yes    Essential hypertension [I10] 07/28/2019 Unknown    Alcohol abuse [F10.10] 07/28/2019 Yes    Cytotoxic cerebral edema [G93.6] 07/28/2019 Yes    Received intravenous tissue plasminogen activator (t-PA) in emergency department [Z92.82]  Not Applicable      Problems Resolved During this  Admission:     No new Assessment & Plan notes have been filed under this hospital service since the last note was generated.  Service: Vascular Neurology      Recommendations:     Post-discharge complication risks: Seizure    Stroke Education given to: family    Follow-up in Stroke Clinic in 4-6 weeks.     Discharge Plan:  Antithrombotics: Aspirin 81mg  Statin: Atorvastatin 40mg  Aggresive risk factor modification: alcohol and drug abuse    Follow Up:  Follow-up Information     Primary Care.    Why:  Please establish care with a new primary care provider in [facility] In 4 weeks.    Specialty:  Vascular Neurology  Why:  Someone will contact you to schedule your stroke follow up appointment. Please contact phone number listed if you do not receive a phone call within 1 week  Contact information:  [id number] [facility] [id number] [phone]                 Patient Instructions:      Activity as tolerated       Medications:  Reconciled Home Medications:      Medication List      START taking these medications    aspirin 81 MG Chew  Take 1 tablet (81 mg total) by mouth once daily.     atorvastatin 40 MG tablet  Commonly known as:  LIPITOR  Take 1 tablet (40 mg total) by mouth once daily.     multivitamin tablet  Commonly known as:  THERAGRAN  Take 1 tablet by mouth once daily.                  Yessy Friedman PA-C  Comprehensive Stroke Center  Department of Vascular Neurology   [facility]

## 2019-08-13 NOTE — NURSING
Pt has been discharged.  Daughter in room to  the pt.  IV 20G to left ac removed.  Discharge instructions submitted to the pt.  No distress noted upon discharge.

## 2019-08-14 ENCOUNTER — PATIENT OUTREACH (OUTPATIENT)
Dept: ADMINISTRATIVE | Facility: CLINIC | Age: 64
End: 2019-08-14

## 2019-08-14 NOTE — PLAN OF CARE
08/14/19 1603   Final Note   Assessment Type Final Discharge Note   Anticipated Discharge Disposition Home   Hospital Follow Up  Appt(s) scheduled? Yes   Discharge plans and expectations educations in teach back method with documentation complete? Yes   Right Care Referral Info   Post Acute Recommendation IRF   Referral Type Home Care   [place] [address]

## 2019-08-14 NOTE — PLAN OF CARE
Pt setup with [facility] .  SW in contact with CM and Medical staff. Will continue to follow and offer support as needed.     Robert Arredondo LMSW  [facility]   Ext. [phone]

## 2019-08-14 NOTE — PATIENT INSTRUCTIONS
Discharge Instructions for Stroke  You have been diagnosed with stroke, also known as a brain attack. During a stroke, blood stops flowing to part of your brain. This can damage areas in the brain that control other parts of the body. Symptoms after a stroke depend on which part of the brain has been affected.  Stroke Risk Factors  Once youve had a stroke, youre at greater risk for another one. Listed below are some other factors that can increase your risk for another stroke:  High blood pressure  High blood cholesterol  Cigarette or cigar smoking  Diabetes  Carotid or other artery disease  Atrial fibrillation, atrial flutter, or other heart disease  Physical inactivity  Obesity  Certain blood disorders (such as sickle cell anemia)  Excessive alcohol use  Abuse of illegal drugs  Race  Gender  Diet high in salty, fried, or greasy foods  Changes in Daily Living  Performing your regular tasks may be difficult after youve had a stroke, but you can learn new ways to manage your daily activities. In fact, doing daily activities may help you to regain muscle strength and bring back function to affected limbs. Be patient, give yourself time to adjust, and appreciate the progress you make.  Daily Activities  You may be at risk of falling. Make changes to your home to help you walk more easily. A therapist will decide if you need an assistive device to walk safely.  You may need to see an occupational or physical therapist to learn new ways of doing things. For example, you may need to make adjustments when bathing or dressing.  Try the following tips for showering or bathing:  Test the water temperature with a hand or foot that was not affected by the stroke.  Use grab bars, a shower seat, a hand-held showerhead, and a long-handled brush.  Try the following tips for dressing:  Dress while sitting, starting with the affected side or limb.  Wear shirts that pull easily over your head and pants or skirts with elastic  waistbands.  Use zippers with loops attached to the pull tabs.  Lifestyle Changes  Take your medications exactly as directed. Dont skip doses.  Change your diet if your doctor tells you to. Your doctor may suggest that you cut back on salt. If so, here are some tips:  Limit canned, dried, packaged, and fast foods. These tend to be high in salt.  When you cook, season foods with herbs instead of salt.  Dont add salt to your food at the table.  Begin an exercise program. Ask your doctor how to get started. You can benefit from simple activities such as walking or gardening.  Have no more than 2 alcoholic drinks a day.  Know your cholesterol level. Follow your doctors recommendations about how to keep cholesterol under control.  If you are a smoker, break the smoking habit. Enroll in a stop-smoking program to improve your chances of success. Ask your doctor about medications or other methods to help you quit.  Follow-Up  Keep your medical appointments. Close follow-up is important to stroke rehabilitation and recovery.  Some medications require blood tests to check for progress or problems. Keep follow-up appointments for any blood tests ordered by your doctors.    When to Seek Medical Care  Call 911 right away if you have any of the following:  Weakness, tingling, or loss of feeling on one side of your face or body  Sudden double vision or trouble seeing in one or both eyes  Sudden trouble talking or slurred speech  Trouble understanding others  Sudden, severe headache  Dizziness, loss of balance, or a sense of falling  Blackouts   © 1353-0146 [employer], [address] [place] [address], [place] [address]. All rights reserved. This information is not intended as a substitute for professional medical care. Always follow your healthcare professional's instructions.

## 2019-08-15 ENCOUNTER — CLINICAL SUPPORT (OUTPATIENT)
Dept: CARDIOLOGY | Facility: HOSPITAL | Age: 64
End: 2019-08-15
Attending: PHYSICIAN ASSISTANT
Payer: MEDICARE

## 2019-08-15 PROCEDURE — 93271 ECG/MONITORING AND ANALYSIS: CPT

## 2019-09-19 ENCOUNTER — DOCUMENTATION ONLY (OUTPATIENT)
Dept: CARDIOLOGY | Facility: HOSPITAL | Age: 64
End: 2019-09-19

## 2019-09-19 NOTE — PROGRESS NOTES
"The above patient cardiac monitor service will be cancelled as of 9/19.Patient was given 3 full business days to contact the monitoring company  "[employer]" to have monitor activated. Patient didn't respond to message left nor letter mailed.   "

## 2020-11-28 NOTE — PLAN OF CARE
08/05/19 1502   Post-Acute Status   Post-Acute Authorization Placement   Post-Acute Placement Status Referrals Sent        Updates sent and called [facility] and [facility]. Had to leave a . Awaiting return call.  SW in contact with CM and Medical staff. Will continue to follow and offer support as needed.     Robert Arredondo LMSW  [facility]   Ext. [phone]       declines